# Patient Record
Sex: MALE | Race: WHITE | NOT HISPANIC OR LATINO | Employment: OTHER | URBAN - METROPOLITAN AREA
[De-identification: names, ages, dates, MRNs, and addresses within clinical notes are randomized per-mention and may not be internally consistent; named-entity substitution may affect disease eponyms.]

---

## 2017-01-17 ENCOUNTER — TRANSFERRED RECORDS (OUTPATIENT)
Dept: HEALTH INFORMATION MANAGEMENT | Facility: CLINIC | Age: 82
End: 2017-01-17

## 2017-01-31 ENCOUNTER — OFFICE VISIT (OUTPATIENT)
Dept: FAMILY MEDICINE | Facility: CLINIC | Age: 82
End: 2017-01-31
Payer: COMMERCIAL

## 2017-01-31 ENCOUNTER — TELEPHONE (OUTPATIENT)
Dept: FAMILY MEDICINE | Facility: CLINIC | Age: 82
End: 2017-01-31

## 2017-01-31 VITALS
TEMPERATURE: 98.7 F | DIASTOLIC BLOOD PRESSURE: 68 MMHG | BODY MASS INDEX: 21.33 KG/M2 | HEART RATE: 116 BPM | WEIGHT: 144 LBS | HEIGHT: 69 IN | SYSTOLIC BLOOD PRESSURE: 116 MMHG

## 2017-01-31 DIAGNOSIS — R82.90 ABNORMAL URINALYSIS: ICD-10-CM

## 2017-01-31 DIAGNOSIS — Z85.46 H/O PROSTATE CANCER: ICD-10-CM

## 2017-01-31 DIAGNOSIS — R82.90 NONSPECIFIC FINDING ON EXAMINATION OF URINE: ICD-10-CM

## 2017-01-31 DIAGNOSIS — Z96.0 HISTORY OF PENILE IMPLANT: ICD-10-CM

## 2017-01-31 DIAGNOSIS — R39.89 URINARY PROBLEM: ICD-10-CM

## 2017-01-31 DIAGNOSIS — R00.0 TACHYCARDIA: ICD-10-CM

## 2017-01-31 DIAGNOSIS — N39.0 URINARY TRACT INFECTION, SITE UNSPECIFIED: Primary | ICD-10-CM

## 2017-01-31 LAB
ALBUMIN UR-MCNC: >=300 MG/DL
APPEARANCE UR: ABNORMAL
BACTERIA #/AREA URNS HPF: ABNORMAL /HPF
BILIRUB UR QL STRIP: NEGATIVE
COLOR UR AUTO: YELLOW
GLUCOSE UR STRIP-MCNC: NEGATIVE MG/DL
HGB UR QL STRIP: ABNORMAL
KETONES UR STRIP-MCNC: NEGATIVE MG/DL
LEUKOCYTE ESTERASE UR QL STRIP: ABNORMAL
NITRATE UR QL: POSITIVE
NON-SQ EPI CELLS #/AREA URNS LPF: ABNORMAL /LPF
PH UR STRIP: 8.5 PH (ref 5–7)
RBC #/AREA URNS AUTO: ABNORMAL /HPF (ref 0–2)
SP GR UR STRIP: 1.02 (ref 1–1.03)
URN SPEC COLLECT METH UR: ABNORMAL
UROBILINOGEN UR STRIP-ACNC: 0.2 EU/DL (ref 0.2–1)
WBC #/AREA URNS AUTO: >100 /HPF (ref 0–2)

## 2017-01-31 PROCEDURE — 87088 URINE BACTERIA CULTURE: CPT | Performed by: PHYSICIAN ASSISTANT

## 2017-01-31 PROCEDURE — 81001 URINALYSIS AUTO W/SCOPE: CPT | Performed by: PHYSICIAN ASSISTANT

## 2017-01-31 PROCEDURE — 87186 SC STD MICRODIL/AGAR DIL: CPT | Performed by: PHYSICIAN ASSISTANT

## 2017-01-31 PROCEDURE — 99213 OFFICE O/P EST LOW 20 MIN: CPT | Performed by: PHYSICIAN ASSISTANT

## 2017-01-31 PROCEDURE — 87086 URINE CULTURE/COLONY COUNT: CPT | Performed by: PHYSICIAN ASSISTANT

## 2017-01-31 RX ORDER — CIPROFLOXACIN 500 MG/1
500 TABLET, FILM COATED ORAL 2 TIMES DAILY
Qty: 20 TABLET | Refills: 0 | Status: SHIPPED | OUTPATIENT
Start: 2017-01-31 | End: 2017-02-10

## 2017-01-31 NOTE — PROGRESS NOTES
"  SUBJECTIVE:                                                    Carlos Faith is a 80 year old male who presents to clinic today for the following health issues:    Genitourinary - Male     Onset: 1 week      Description:   Dysuria (painful urination): YES- burning   Hematuria (blood in urine): YES  Frequency: YES- every 2.5 hours   Are you urinating at night : YES  Hesitancy (delay in urine): YES  Retention (unable to empty): YES  Decrease in urinary flow: YES  Incontinence: no     Progression of Symptoms:  worsening    Accompanying Signs & Symptoms:  Fever: no   Back/Flank pain: no   Urethral discharge: no   Testicle lumps/masses/pain: no   Nausea and/or vomiting: no   Abdominal pain: no    History:   History of frequent UTI's: YES  History of kidney stones: no   History of hernias: YES  Personal or Family history of Prostate problems: YES- Paternal Uncle   Sexually active:      Precipitating factors:   Has been treated for multiple UTI's over the past few months     Alleviating factors:  Nothing          Therapies Tried and outcome: None     Prostatectomy at age 54 for treatment of prostate cancer. Uneventful course and negative/zero level PSA since that time. He does get recurrent UTIs this past year. This would be his 3rd or 4th. PCP recommended he see Urology but he hasn't. He's straight catheterized in the past to treat this when he's had decreased flow/scar tissue but he hasn't had to cath for many years. He did report a episode this past few days of \"blockage\" that eventually came out - described as what sounds like white blood cells or other clumps of cells.     Denies fevers, chills, CVA tenderness or other symptoms.    Patient Active Problem List   Diagnosis     H/O prostate cancer     History of penile implant     Mixed hyperlipidemia     Adjustment disorder with anxious mood     Gastroesophageal reflux disease without esophagitis        Problem list and histories reviewed & adjusted, as " "indicated.  Additional history: as documented    Problem list, Medication list, Allergies, and Medical/Social/Surgical histories reviewed in [facility] and updated as appropriate.    ROS:  Constitutional, HEENT, cardiovascular, pulmonary, gi and gu systems are negative, except as otherwise noted.    OBJECTIVE:                                                    /68 mmHg  Pulse 116  Temp(Src) 98.7  F (37.1  C) (Oral)  Ht 5' 8.5\" (1.74 m)  Wt 144 lb (65.318 kg)  BMI 21.57 kg/m2  Body mass index is 21.57 kg/(m^2).  GENERAL: healthy, alert and no distress  No CVA tenderness, abdomen soft, non tender     Results for orders placed or performed in visit on 01/31/17   *UA reflex to Microscopic and Culture ([facility] (except [place] and [place])   Result Value Ref Range    Color Urine Yellow     Appearance Urine Cloudy     Glucose Urine Negative NEG mg/dL    Bilirubin Urine Negative NEG    Ketones Urine Negative NEG mg/dL    Specific Gravity Urine 1.020 1.003 - 1.035    Blood Urine Large (A) NEG    pH Urine 8.5 (H) 5.0 - 7.0 pH    Protein Albumin Urine >=300 (A) NEG mg/dL    Urobilinogen Urine 0.2 0.2 - 1.0 EU/dL    Nitrite Urine Positive (A) NEG    Leukocyte Esterase Urine Large (A) NEG    Source Midstream Urine    Urine Microscopic   Result Value Ref Range    WBC Urine >100 (A) 0 - 2 /HPF    RBC Urine 10-25 (A) 0 - 2 /HPF    Squamous Epithelial /LPF Urine Few FEW /LPF    Bacteria Urine Many (A) NEG /HPF         ASSESSMENT/PLAN:                                                        ICD-10-CM    1. Urinary tract infection, site unspecified N39.0 ciprofloxacin (CIPRO) 500 MG tablet     order for DME   2. Urinary problem R39.89 *UA reflex to Microscopic and Culture ([facility] (except [place] and [place])     Urine Culture Aerobic Bacterial     Urine Microscopic   3. Nonspecific finding on examination of urine R82.90 Urine Culture Aerobic Bacterial   4. " Abnormal urinalysis R82.90    5. Tachycardia R00.0    6. H/O prostate cancer Z85.46    7. History of penile implant Z96.89       Reviewed - will treat. Sulfa allergy. 10 days of Cipro given, higher dose. This prescription is given with a discussion of side effects, risks and proper use.  Instructions are given to follow up if not improving or symptoms change or worsen as discussed.     SINDI Puente, PA-C

## 2017-01-31 NOTE — NURSING NOTE
"Chief Complaint   Patient presents with     UTI       Initial /68 mmHg  Pulse 116  Temp(Src) 98.7  F (37.1  C) (Oral)  Ht 5' 8.5\" (1.74 m)  Wt 144 lb (65.318 kg)  BMI 21.57 kg/m2 Estimated body mass index is 21.57 kg/(m^2) as calculated from the following:    Height as of this encounter: 5' 8.5\" (1.74 m).    Weight as of this encounter: 144 lb (65.318 kg).  BP completed using cuff size: brenna Ruiz CMA (AAMA)      "

## 2017-02-02 LAB
BACTERIA SPEC CULT: ABNORMAL
MICRO REPORT STATUS: ABNORMAL
MICROORGANISM SPEC CULT: ABNORMAL
SPECIMEN SOURCE: ABNORMAL

## 2017-02-08 DIAGNOSIS — K21.9 GASTROESOPHAGEAL REFLUX DISEASE WITHOUT ESOPHAGITIS: Primary | ICD-10-CM

## 2017-02-08 NOTE — TELEPHONE ENCOUNTER
ranitidine (ZANTAC) 300 MG tablet      Last Written Prescription Date: 12-2-2016  Last Fill Quantity: 30,  # refills: 1   Last Office Visit with [facility], [facility] or [facility] prescribing provider: 1-

## 2017-02-13 ENCOUNTER — TELEPHONE (OUTPATIENT)
Dept: FAMILY MEDICINE | Facility: CLINIC | Age: 82
End: 2017-02-13

## 2017-02-13 NOTE — TELEPHONE ENCOUNTER
Pt called back wanting to know if Sterling would be able to squeeze him in. Left message earlier and was worried cause he had notheard back yet.  .Lacy Walden  Patient Representative

## 2017-02-13 NOTE — TELEPHONE ENCOUNTER
"Called and spoke with patient. He was treated for a UTI and finished his antibiotic 2 days ago. His primary symptoms were blood in urine and urine cloudiness, which disappeared while on antibiotic. When he finished his antibiotic, he said his urine turned cloudy again. He denies pain anywhere, fever, blood in urine, inability to urinate, dysuria, or frequency. He is requesting an appointment. Appointment made, he will go to ER if he develops pain, fever, or blood in urine. Telephone triage protocols for nurse Melida Tidwell 5th edition \"painful/difficult urination\".    Melo Castillo RN    "

## 2017-02-13 NOTE — TELEPHONE ENCOUNTER
Reason for Call:  Same Day Appointment, Requested Provider:  Van Ge PA-C    PCP: Antoinette Bagley    Reason for visit: UTI    Duration of symptoms: Ongoing    Have you been treated for this in the past? Yes    Additional comments: Patient saw Van Mathises for a UTI on 1/31/17 and it still has not gone away yet. He asked if there is any way Van Mathises could see him Tuesday or Thursday around 1-2 pm. Patient prefers to see him or Dr. Bagley.     Can we leave a detailed message on this number? YES    Phone number patient can be reached at: Home number on file [phone] (home)    Best Time: Anytime    Call taken on 2/13/2017 at 1:08 PM by Shikha Martel

## 2017-02-14 ENCOUNTER — OFFICE VISIT (OUTPATIENT)
Dept: FAMILY MEDICINE | Facility: CLINIC | Age: 82
End: 2017-02-14
Payer: COMMERCIAL

## 2017-02-14 VITALS
HEART RATE: 100 BPM | HEIGHT: 69 IN | DIASTOLIC BLOOD PRESSURE: 70 MMHG | SYSTOLIC BLOOD PRESSURE: 148 MMHG | WEIGHT: 147 LBS | BODY MASS INDEX: 21.77 KG/M2 | TEMPERATURE: 98.6 F

## 2017-02-14 DIAGNOSIS — R82.90 ABNORMAL FINDING IN URINE: ICD-10-CM

## 2017-02-14 DIAGNOSIS — R39.9 UTI SYMPTOMS: Primary | ICD-10-CM

## 2017-02-14 LAB
ALBUMIN UR-MCNC: ABNORMAL MG/DL
APPEARANCE UR: ABNORMAL
BACTERIA #/AREA URNS HPF: ABNORMAL /HPF
BILIRUB UR QL STRIP: NEGATIVE
COLOR UR AUTO: YELLOW
GLUCOSE UR STRIP-MCNC: NEGATIVE MG/DL
HGB UR QL STRIP: ABNORMAL
KETONES UR STRIP-MCNC: NEGATIVE MG/DL
LEUKOCYTE ESTERASE UR QL STRIP: ABNORMAL
NITRATE UR QL: POSITIVE
PH UR STRIP: 6 PH (ref 5–7)
RBC #/AREA URNS AUTO: ABNORMAL /HPF (ref 0–2)
SP GR UR STRIP: 1.02 (ref 1–1.03)
URN SPEC COLLECT METH UR: ABNORMAL
UROBILINOGEN UR STRIP-ACNC: 0.2 EU/DL (ref 0.2–1)
WBC #/AREA URNS AUTO: ABNORMAL /HPF (ref 0–2)

## 2017-02-14 PROCEDURE — 87186 SC STD MICRODIL/AGAR DIL: CPT | Performed by: PHYSICIAN ASSISTANT

## 2017-02-14 PROCEDURE — 99213 OFFICE O/P EST LOW 20 MIN: CPT | Performed by: PHYSICIAN ASSISTANT

## 2017-02-14 PROCEDURE — 87086 URINE CULTURE/COLONY COUNT: CPT | Performed by: PHYSICIAN ASSISTANT

## 2017-02-14 PROCEDURE — 81001 URINALYSIS AUTO W/SCOPE: CPT | Performed by: PHYSICIAN ASSISTANT

## 2017-02-14 PROCEDURE — 87088 URINE BACTERIA CULTURE: CPT | Performed by: PHYSICIAN ASSISTANT

## 2017-02-14 RX ORDER — CEPHALEXIN 500 MG/1
500 CAPSULE ORAL 2 TIMES DAILY
Qty: 42 CAPSULE | Refills: 0 | Status: SHIPPED | OUTPATIENT
Start: 2017-02-14 | End: 2017-03-22

## 2017-02-14 ASSESSMENT — PATIENT HEALTH QUESTIONNAIRE - PHQ9: 5. POOR APPETITE OR OVEREATING: NOT AT ALL

## 2017-02-14 ASSESSMENT — ANXIETY QUESTIONNAIRES
2. NOT BEING ABLE TO STOP OR CONTROL WORRYING: NOT AT ALL
5. BEING SO RESTLESS THAT IT IS HARD TO SIT STILL: NOT AT ALL
3. WORRYING TOO MUCH ABOUT DIFFERENT THINGS: NOT AT ALL
6. BECOMING EASILY ANNOYED OR IRRITABLE: NOT AT ALL
1. FEELING NERVOUS, ANXIOUS, OR ON EDGE: NOT AT ALL
7. FEELING AFRAID AS IF SOMETHING AWFUL MIGHT HAPPEN: NOT AT ALL
GAD7 TOTAL SCORE: 0

## 2017-02-14 NOTE — PROGRESS NOTES
SUBJECTIVE:                                                    Carlos Faith is a 81 year old male who presents to clinic today for the following health issues:      Genitourinary - Male     Onset: 10 days    Description:   Dysuria (painful urination): no   Hematuria (blood in urine): no   Frequency: YES  Are you urinating at night : YES- once  Hesitancy (delay in urine): YES- sometimes  Retention (unable to empty): no   Decrease in urinary flow: no   Incontinence: no     Progression of Symptoms:  improving    Accompanying Signs & Symptoms:  Fever: no   Back/Flank pain: no   Urethral discharge: no   Testicle lumps/masses/pain: no   Nausea and/or vomiting: no   Abdominal pain: no    History:   History of frequent UTI's: no   History of kidney stones: no   History of hernias: YES  Personal or Family history of Prostate problems: YES  Sexually active: no     Precipitating factors:   none    Alleviating factors:  none         Therapies Tried and outcome: Cipro; Outcome - helped by the 7th day, but by 8th day urine was cloudy again    Patient presents with reoccurrence of urinary symptoms that presented with his previous diagnosis of urinary tract infection. Patient had been placed on a course of ciprofloxacin which was empiric but further showed to be proper for the Proteus spp. that was grown on culture. Patient noticed remission of symptoms after about seven days on the antibiotics but a resumption of the feeling of frequency by the eighth or ninth day of the course. In addition, patient noticed a rash on both legs and his buttocks and stated that he was unable to fall asleep on the medication. Patient currently denies any fevers, burning or pain on urination, feelings of incomplete voiding, discharge, abdominal pain, N/V, diarrhea, or blood in the urine or stool.     Problem list and histories reviewed & adjusted, as indicated.  Additional history: as documented    Problem list, Medication list, Allergies,  "and Medical/Social/Surgical histories reviewed in [facility] and updated as appropriate.    ROS:  Constitutional, HEENT, cardiovascular, pulmonary, gi and gu systems are negative, except as otherwise noted.    OBJECTIVE:                                                    /70 (Cuff Size: Adult Regular)  Pulse 100  Temp 98.6  F (37  C) (Oral)  Ht 5' 8.5\" (1.74 m)  Wt 147 lb (66.7 kg)  BMI 22.03 kg/m2  Body mass index is 22.03 kg/(m^2).  GENERAL: healthy, alert and no distress  RESP: lungs clear to auscultation - no rales, rhonchi or wheezes  CV: regular rate and rhythm, normal S1 S2, no S3 or S4, no murmur, click or rub, no peripheral edema and peripheral pulses strong  ABDOMEN: soft, nontender, no hepatosplenomegaly, no masses and bowel sounds normal, no suprapubic pain, no peritoneal signs or tenderness over the appendix or at McBurney's point  MS: no gross musculoskeletal defects noted, no edema  BACK: no CVA tenderness, no paralumbar tenderness    Diagnostic Test Results:  Urinalysis - Positive nitrites, moderate Leukocyte esterase, >100 WBC, and many bacteria     ASSESSMENT/PLAN:                                                      (R39.9) UTI symptoms  (primary encounter diagnosis)  Comment: Patient is suffering from a UTI, likely of the same etiology as cultured previously. Additional diagnoses considered but ultimately rejected based on patient's signs and symptoms, physical exam, natural history of illness, epidemiology, and clinical judgment, include pyelonephritis, bladder cancer, urinary strictures due to previous prostate surgery, appendicitis, nephrolithiasis.  Plan: UA reflex to Microscopic and Culture         ([facility] (except         [place] and [place]), Urine Microscopic,         cephALEXin (KEFLEX) 500 MG capsule, UROLOGY         ADULT REFERRAL  Will treat. Different antibiotic as symptoms arose mid treatment with Cipro. This prescription is given with a discussion " of side effects, risks and proper use.  Instructions are given to follow up if not improving or symptoms change or worsen as discussed.           (R82.90) Abnormal finding in urine  Comment: Patient is suffering from a UTI, likely of the same etiology as cultured previously. Additional diagnoses considered but ultimately rejected based on patient's signs and symptoms, physical exam, natural history of illness, epidemiology, and clinical judgment, include pyelonephritis, bladder cancer, urinary strictures due to previous prostate surgery, appendicitis, nephrolithiasis.  Plan: Urine Culture Aerobic Bacterial, cephALEXin         (KEFLEX) 500 MG capsule, UROLOGY ADULT REFERRAL     FRENCH BUCHANAN PA-C  [facility]

## 2017-02-14 NOTE — NURSING NOTE
"Chief Complaint   Patient presents with     Urinary Problem     Referral     [facility]       Initial /70 (Cuff Size: Adult Regular)  Pulse 100  Temp 98.6  F (37  C) (Oral)  Ht 5' 8.5\" (1.74 m)  Wt 147 lb (66.7 kg)  BMI 22.03 kg/m2 Estimated body mass index is 22.03 kg/(m^2) as calculated from the following:    Height as of this encounter: 5' 8.5\" (1.74 m).    Weight as of this encounter: 147 lb (66.7 kg).  Medication Reconciliation: complete   Merissa Garcia, Certified Medical Assistant (AAMA)     "

## 2017-02-14 NOTE — MR AVS SNAPSHOT
After Visit Summary   2/14/2017    Carlos Faith    MRN: [id number]           Patient Information     Date Of Birth          1935        Visit Information        Provider Department      2/14/2017 11:00 AM Van Ge PA-C [facility]        Today's Diagnoses     UTI symptoms    -  1    Abnormal finding in urine           Follow-ups after your visit        Additional Services     UROLOGY ADULT REFERRAL       Your provider has referred you to: G: [facility] [place] [phone]   [email or web address]  UM: [place] [facility] C[facility] [phone]   [email or web address]    Please be aware that coverage of these services is subject to the terms and limitations of your health insurance plan.  Call member services at your health plan with any benefit or coverage questions.      Please bring the following with you to your appointment:    (1) Any X-Rays, CTs or MRIs which have been performed.  Contact the facility where they were done to arrange for  prior to your scheduled appointment.    (2) List of current medications  (3) This referral request   (4) Any documents/labs given to you for this referral                  Who to contact     If you have questions or need follow up information about today's clinic visit or your schedule please contact [facility] directly at [phone].  Normal or non-critical lab and imaging results will be communicated to you by MyChart, letter or phone within 4 business days after the clinic has received the results. If you do not hear from us within 7 days, please contact the clinic through MyChart or phone. If you have a critical or abnormal lab result, we will notify you by phone as soon as possible.  Submit refill requests through [employer] or call your pharmacy and they will forward  "the refill request to us. Please allow 3 business days for your refill to be completed.          Additional Information About Your Visit        [employer] Information     [employer] lets you send messages to your doctor, view your test results, renew your prescriptions, schedule appointments and more. To sign up, go to [email or web address] . Click on \"Log in\" on the left side of the screen, which will take you to the Welcome page. Then click on \"Sign up Now\" on the right side of the page.     You will be asked to enter the access code listed below, as well as some personal information. Please follow the directions to create your username and password.     Your access code is: [email or web address]  Expires: 5/15/2017 11:50 AM     Your access code will  in 90 days. If you need help or a new code, please call your [place] clinic or [phone].        Care EveryWhere ID     This is your Care EveryWhere ID. This could be used by other organizations to access your [place] medical records  [id number]        Your Vitals Were     Pulse Temperature Height BMI (Body Mass Index)          100 98.6  F (37  C) (Oral) 5' 8.5\" (1.74 m) 22.03 kg/m2         Blood Pressure from Last 3 Encounters:   17 148/70   17 116/68   10/04/16 126/86    Weight from Last 3 Encounters:   17 147 lb (66.7 kg)   17 144 lb (65.3 kg)   10/04/16 144 lb (65.3 kg)              We Performed the Following     *UA reflex to Microscopic and Culture ([facility] (except [place] and [place])     Urine Culture Aerobic Bacterial     Urine Microscopic     UROLOGY ADULT REFERRAL          Today's Medication Changes          These changes are accurate as of: 17 11:51 AM.  If you have any questions, ask your nurse or doctor.               Start taking these medicines.        Dose/Directions    cephALEXin 500 MG capsule   Commonly known as:  KEFLEX   Used for:  UTI symptoms, Abnormal finding in urine   Started " by:  Van Ge PA-C        Dose:  500 mg   Take 1 capsule (500 mg) by mouth 2 times daily   Quantity:  42 capsule   Refills:  0            Where to get your medicines      These medications were [place] [facility] [place] [address]  [address] [facility] [address]     Phone:  [phone]     cephALEXin 500 MG capsule                Primary Care Provider Office Phone # Fax #    Antoinette Bagley [phone]       [facility] [address] [address]        Thank you!     Thank you for choosing [facility]  for your care. Our goal is always to provide you with excellent care. Hearing back from our patients is one way we can continue to improve our services. Please take a few minutes to complete the written survey that you may receive in the mail after your visit with us. Thank you!             Your Updated Medication List - Protect others around you: Learn how to safely use, store and throw away your medicines at www.disposemymeds.org.          This list is accurate as of: 2/14/17 11:51 AM.  Always use your most recent med list.                   Brand Name Dispense Instructions for use    amitriptyline 25 MG tablet    ELAVIL    90 tablet    Take 1 tablet (25 mg) by mouth At Bedtime       cephALEXin 500 MG capsule    KEFLEX    42 capsule    Take 1 capsule (500 mg) by mouth 2 times daily       glucosamine-chondroitin 500-400 MG Caps per capsule      Take 1 capsule by mouth daily       order for DME     1 Box    Equipment being ordered: Straight Cath  - box, size and length per patient preference       pantoprazole 40 MG EC tablet    PROTONIX    90 tablet    Take 1 tablet (40 mg) by mouth every morning (before breakfast)       ranitidine 300 MG tablet    ZANTAC    30 tablet    Take 1 tablet (300 mg) by mouth nightly as needed for heartburn       simvastatin 5 MG tablet    ZOCOR     180 tablet    Take 2 tablets (10 mg) by mouth At Bedtime **Due for office visit for further refills**       SLO-NIACIN PO

## 2017-02-15 ASSESSMENT — ANXIETY QUESTIONNAIRES: GAD7 TOTAL SCORE: 0

## 2017-02-15 ASSESSMENT — PATIENT HEALTH QUESTIONNAIRE - PHQ9: SUM OF ALL RESPONSES TO PHQ QUESTIONS 1-9: 0

## 2017-02-16 NOTE — TELEPHONE ENCOUNTER
"Attempt # 1    Called patient at home number.     Was call answered?  Yes.  \"May I please speak with <patient name>\"  Is patient available?   No. Left message with female for patient to call me back.     Sapna Knight RN   February 15, 2017 6:01 PM  [facility] Triage   [phone]   "

## 2017-02-16 NOTE — TELEPHONE ENCOUNTER
Called and spoke to  Carlos. He only takes pantoprazole. Request canceled and updated med list.     Sapna Knight RN   February 15, 2017 6:08 PM  [facility] Triage   [phone]

## 2017-02-17 ENCOUNTER — TELEPHONE (OUTPATIENT)
Dept: FAMILY MEDICINE | Facility: CLINIC | Age: 82
End: 2017-02-17

## 2017-02-17 DIAGNOSIS — R09.89 CHOKING EPISODE: ICD-10-CM

## 2017-02-17 DIAGNOSIS — K21.9 GASTROESOPHAGEAL REFLUX DISEASE WITHOUT ESOPHAGITIS: Primary | ICD-10-CM

## 2017-02-17 NOTE — TELEPHONE ENCOUNTER
Reason for Call:  Other :  Order    Detailed comments:  Patient would like an order to get his EGD done at [facility].  Please fax order to [phone].  Patient would like to schedule ASAP and cannot do so unless there is an order.    Phone Number Patient can be reached at: Home number on file [phone] (home)    Best Time: Any      Can we leave a detailed message on this number? YES    Call taken on 2/17/2017 at 2:51 PM by Emma Martinez

## 2017-02-20 NOTE — TELEPHONE ENCOUNTER
Reviewed. I'm not sure what he's talking about. I saw him for 2 UTIs. This is Dr. Bagley's patient.  Recommend to defer to her - I don't see a GI referral in the system and he and I never talked about any GI issues.  Thanks.  Van Ge, ANDRES, PA-C

## 2017-02-21 ENCOUNTER — TELEPHONE (OUTPATIENT)
Dept: FAMILY MEDICINE | Facility: CLINIC | Age: 82
End: 2017-02-21

## 2017-02-21 NOTE — TELEPHONE ENCOUNTER
Team RN - please call patient and let him know his urine culture results grew out a different bacteria than previously. The 2 bacteria that grew out do not come from his mouth, so his theory that flossing is causing recurrent infections is not true.    I'd recommend again (as has his PCP Dr. Bagley) that he see Urology. Please encourage that. I do believe there is a referral in [facility] - we can give him that number again.     Let me know if he's symptomatic in any way.  Thank you.  SINDI Puente, PA-C

## 2017-02-21 NOTE — TELEPHONE ENCOUNTER
Called patient's home number, woman answered phone and left message to call us back.    Melo Castillo RN

## 2017-02-22 NOTE — TELEPHONE ENCOUNTER
Called and gave patient info below. He will make a urology appt.    Patient is wanting referral to [facility]. He said he had a referral faxed to [facility], but he has an appt for [facility].    Melo Castillo RN

## 2017-02-22 NOTE — TELEPHONE ENCOUNTER
Reviewed. I don't know anything about his GI request. I saw him for the acute UTI. Please route Gastro / Specialty requests or questions back to his PCP.  Thanks!  [place]

## 2017-02-24 NOTE — TELEPHONE ENCOUNTER
Referral didn't specify location- he wants to go to [facility]. Re-orded & gave phone number & encouraged him to call to schedule. He had a colonoscopy there recently & was very happy with the service.  Halie Snyder RN

## 2017-02-24 NOTE — TELEPHONE ENCOUNTER
Patient did not complain of choking  I did place a GI referral  He may need to be seen if having significant concerns  Antoinette Bagley D.O.

## 2017-02-27 ENCOUNTER — TELEPHONE (OUTPATIENT)
Dept: FAMILY MEDICINE | Facility: CLINIC | Age: 82
End: 2017-02-27

## 2017-02-27 DIAGNOSIS — K21.00 GASTROESOPHAGEAL REFLUX DISEASE WITH ESOPHAGITIS: ICD-10-CM

## 2017-02-27 RX ORDER — PANTOPRAZOLE SODIUM 40 MG/1
40 TABLET, DELAYED RELEASE ORAL
Qty: 90 TABLET | Refills: 2 | Status: SHIPPED | OUTPATIENT
Start: 2017-02-27 | End: 2017-03-22

## 2017-02-27 NOTE — TELEPHONE ENCOUNTER
Left VM. Patient spoke with Sapna on 2/15 & patient stated he wasn't taking ranitidine.   Halie Snyder RN

## 2017-02-27 NOTE — TELEPHONE ENCOUNTER
Carlos calls back stating he doesn't care which drug he takes but he has never received the protonix from [facility] [employer] [facility] mail service.  I resent protonix & asked him to call [facility] [employer] [facility] to make sure they are sending #90 as he requests.  Halie Snyder RN

## 2017-02-27 NOTE — TELEPHONE ENCOUNTER
Patient is calling regarding a refill on Ranitidine patient noted that he has been trying to get a refill for about a month. Please note 02/08/2017 phone message.  Send to [facility] [email or web address] [phone] RX #[id number]  Please call and advise Thank you [phone]

## 2017-03-09 ENCOUNTER — TRANSFERRED RECORDS (OUTPATIENT)
Dept: HEALTH INFORMATION MANAGEMENT | Facility: CLINIC | Age: 82
End: 2017-03-09

## 2017-03-22 ENCOUNTER — TELEPHONE (OUTPATIENT)
Dept: FAMILY MEDICINE | Facility: CLINIC | Age: 82
End: 2017-03-22

## 2017-03-22 ENCOUNTER — OFFICE VISIT (OUTPATIENT)
Dept: FAMILY MEDICINE | Facility: CLINIC | Age: 82
End: 2017-03-22
Payer: COMMERCIAL

## 2017-03-22 VITALS
DIASTOLIC BLOOD PRESSURE: 86 MMHG | SYSTOLIC BLOOD PRESSURE: 146 MMHG | BODY MASS INDEX: 22.13 KG/M2 | TEMPERATURE: 98.1 F | RESPIRATION RATE: 16 BRPM | WEIGHT: 149.4 LBS | HEIGHT: 69 IN | HEART RATE: 94 BPM

## 2017-03-22 DIAGNOSIS — R82.90 ABNORMAL URINALYSIS: ICD-10-CM

## 2017-03-22 DIAGNOSIS — N39.0 RECURRENT UTI (URINARY TRACT INFECTION): Primary | ICD-10-CM

## 2017-03-22 DIAGNOSIS — R05.9 COUGH: ICD-10-CM

## 2017-03-22 DIAGNOSIS — R30.0 DYSURIA: ICD-10-CM

## 2017-03-22 DIAGNOSIS — D22.9 ATYPICAL MOLE: ICD-10-CM

## 2017-03-22 LAB
ALBUMIN UR-MCNC: ABNORMAL MG/DL
APPEARANCE UR: ABNORMAL
BACTERIA #/AREA URNS HPF: ABNORMAL /HPF
BILIRUB UR QL STRIP: NEGATIVE
COLOR UR AUTO: YELLOW
GLUCOSE UR STRIP-MCNC: NEGATIVE MG/DL
HGB UR QL STRIP: ABNORMAL
KETONES UR STRIP-MCNC: NEGATIVE MG/DL
LEUKOCYTE ESTERASE UR QL STRIP: ABNORMAL
NITRATE UR QL: POSITIVE
NON-SQ EPI CELLS #/AREA URNS LPF: ABNORMAL /LPF
PH UR STRIP: 7 PH (ref 5–7)
RBC #/AREA URNS AUTO: ABNORMAL /HPF (ref 0–2)
SP GR UR STRIP: 1.01 (ref 1–1.03)
URN SPEC COLLECT METH UR: ABNORMAL
UROBILINOGEN UR STRIP-ACNC: 0.2 EU/DL (ref 0.2–1)
WBC #/AREA URNS AUTO: >100 /HPF (ref 0–2)

## 2017-03-22 PROCEDURE — 87186 SC STD MICRODIL/AGAR DIL: CPT | Performed by: FAMILY MEDICINE

## 2017-03-22 PROCEDURE — 87086 URINE CULTURE/COLONY COUNT: CPT | Performed by: FAMILY MEDICINE

## 2017-03-22 PROCEDURE — 81001 URINALYSIS AUTO W/SCOPE: CPT | Performed by: FAMILY MEDICINE

## 2017-03-22 PROCEDURE — 99214 OFFICE O/P EST MOD 30 MIN: CPT | Performed by: FAMILY MEDICINE

## 2017-03-22 PROCEDURE — 87088 URINE BACTERIA CULTURE: CPT | Performed by: FAMILY MEDICINE

## 2017-03-22 RX ORDER — CIPROFLOXACIN 250 MG/1
250 TABLET, FILM COATED ORAL 2 TIMES DAILY
Qty: 20 TABLET | Refills: 0 | Status: SHIPPED | OUTPATIENT
Start: 2017-03-22 | End: 2017-03-29

## 2017-03-22 NOTE — TELEPHONE ENCOUNTER
Reason for Call:  Same Day Appointment, Requested Provider:  Antoinette Bagley DO    PCP: Antoinette Bagley    Reason for visit: UTI and cold    Duration of symptoms: one week for urinary and Saturday for the cold    Additional comments: patient would like to be seen today at [place] with any provider.    Can we leave a detailed message on this number? YES    Phone number patient can be reached at: Home number on file [phone] (home)    Best Time: anytime    Call taken on 3/22/2017 at 9:55 AM by Radha Martinez

## 2017-03-22 NOTE — PATIENT INSTRUCTIONS
Use antibiotics  Increase fluids  Saline spray  Follow up with Urology  Follow up for mole removal as discussed  Antoinette Bagley D.O.    [facility]   Discharged by : Bronwyn Pierre MA    Paper scripts provided to patient : no     If you have any questions regarding your visit please contact your care team:     [facility] Clinic Hours Telephone Number   Dr. Bronwyn Cartagena, CARLA   7am-7pm Monday - Thursday   7am-5pm Fridays  [phone]   (Appointment scheduling available 24/7)   RN Line   [phone] option 2       For a Price Quote for your services, please call our Consumer Price Line at [phone].     What options do I have for visits at the clinic other than the traditional office visit?     To expand how we care for you, many of our providers are utilizing electronic visits (e-visits) and telephone visits, when medically appropriate, for interactions with their patients rather than a visit in the clinic. We also offer nurse visits for many medical concerns. Just like any other service, we will bill your insurance company for this type of visit based on time spent on the phone with your provider. Not all insurance companies cover these visits. Please check with your medical insurance if this type of visit is covered. You will be responsible for any charges that are not paid by your insurance.   E-visits via The Label Corphart: generally incur a $35.00 fee.     Telephone visits:   Time spent on the phone: *charged based on time that is spent on the phone in increments of 10 minutes. Estimated cost:   5-10 mins $30.00   11-20 mins. $59.00   21-30 mins. $85.00     Use The Label Corphart (secure email communication and access to your chart) to send your primary care provider a message or make an appointment. Ask someone on your Team how to sign up for [email or web address].     As always, Thank you for trusting us with your health care needs!      [facility]  Services:    Scheduling Appointments  [facility]  Call: [phone]    [facility]  Call: [phone]    [facility]  Call: [phone]      WHERE TO GO FOR CARE?    Clinic    Make an appointment if you:       Are sick (cold, cough, flu, sore throat, earache or in pain).       Have a small injury (sprain, small cut, burn or broken bone).       Need a physical exam, Pap smear, vaccine or prescription refill.       Have questions about your health or medicines.    To reach us:      Call [phone] ([phone]). Open 24 hours every day. (For counseling services, call [phone].)    Log into [employer] at Fio. (Visit AFreeze to create an account.) Hospital emergency room    An emergency is a serious or life- threatening problem that must be treated right away.    Call [phone] or get to the hospital if you have:      Very bad or sudden:            - Chest pain or pressure         - Bleeding         - Head or belly pain         - Dizziness or trouble seeing, walking or                          Speaking      Problems breathing      Blood in your vomit or you are coughing up blood      A major injury (knocked out, loss of a finger or limb, rape, broken bone protruding from skin)    A mental health crisis. (Or call the Mental Health Crisis line at 1-727.297.1474 or Suicide Prevention Hotline at 1-405.638.6227.)    Open 24 hours every day. You don't need an appointment.     Urgent care    Visit urgent care for sickness or small injuries when the clinic is closed. You don't need an appointment. To check hours or find an urgent care near you, visit [email or web address]. Online care    Get online care from [employer] for more than 70 common problems, like colds, allergies and infections. Open 24 hours every day at: [email or web address]   Need help deciding?    For advice about where to be seen, you may call your clinic  and ask to speak with a nurse. We're here for you 24 hours every day.         If you are deaf or hard of hearing, please let us know. We provide many free services including sign language interpreters, oral interpreters, TTYs, telephone amplifiers, note takers and written materials.

## 2017-03-22 NOTE — PROGRESS NOTES
SUBJECTIVE:                                                    Carlos Faith is a 82 year old male who presents to clinic today for the following health issues:      Genitourinary - Male     Onset:  Over a week now    Description:   Dysuria (painful urination): no   Hematuria (blood in urine): no   Frequency: YES  Are you urinating at night : YES-  Usually once  Hesitancy (delay in urine): no   Retention (unable to empty): no   Decrease in urinary flow: YES a little  Incontinence: no     Progression of Symptoms:  same    Accompanying Signs & Symptoms:  Fever: no   Back/Flank pain: no   Urethral discharge: no   Testicle lumps/masses/pain: no   Nausea and/or vomiting: no   Abdominal pain: no    History:   History of frequent UTI's: YES  History of kidney stones: no   History of hernias: YES-  2   Personal or Family history of Prostate problems: YES- Prostate Cancer  Sexually active: no     Precipitating factors:   None    Alleviating factors:  None         Therapies Tried and outcome: Keflex;    Prostatectomy at age 54 for treatment of prostate cancer. Uneventful course and negative/zero level PSA since that time. He does get recurrent UTIs this past year. This would be his 5th. PCP recommended he see Urology but he hasn't.he feels like his incontinence is worsening and has an appoint with urology as discussed in 2 weeks     Denies fevers, chills, CVA tenderness or other symptoms.  1/31-grew proteus mirabilis-sensitive to lots abx  2/14-grew ecoli-sensitive to lots of antibiotics  10/4-mixed sharla  1/7-mixed sharla    Cough for a week, no fever, no weakness, no fatigue    History of ulcer and has not used PPi for a week and feels good  Had egd many years ago and normal results no symptoms    Mole on the back changing, no history of skin cancer  Darker and bigger          Problem list and histories reviewed & adjusted, as indicated.  Additional history: as documented    Patient Active Problem List   Diagnosis  "    H/O prostate cancer     History of penile implant     Mixed hyperlipidemia     Adjustment disorder with anxious mood     Gastroesophageal reflux disease without esophagitis     Past Surgical History:   Procedure Laterality Date      penile implant  1994     HERNIA REPAIR, INGUINAL RT/LT       PROSTATE SURGERY  1994    Prostatectomy       Social History   Substance Use Topics     Smoking status: Never Smoker     Smokeless tobacco: Never Used     Alcohol use 0.0 oz/week     0 Standard drinks or equivalent per week      Comment: Wine     Family History   Problem Relation Age of Onset     Coronary Artery Disease Father      83 MI     Prostate Cancer Paternal Grandfather            Reviewed and updated as needed this visit by clinical staff  Tobacco  Allergies  Meds  Med Hx  Surg Hx  Fam Hx  Soc Hx      Reviewed and updated as needed this visit by Provider         ROS:  Constitutional, HEENT, cardiovascular, pulmonary, GI, , musculoskeletal, neuro, skin, endocrine and psych systems are negative, except as otherwise noted.    OBJECTIVE:                                                    /86 (BP Location: Right arm, Patient Position: Chair, Cuff Size: Adult Regular)  Pulse 94  Temp 98.1  F (36.7  C) (Oral)  Resp 16  Ht 5' 8.5\" (1.74 m)  Wt 149 lb 6.4 oz (67.8 kg)  BMI 22.39 kg/m2  Body mass index is 22.39 kg/(m^2).  GENERAL: healthy, alert and no distress  EYES: Eyes grossly normal to inspection, PERRL and conjunctivae and sclerae normal  HENT: ear canals and TM's normal, nose and mouth without ulcers or lesions  NECK: no adenopathy, no asymmetry, masses, or scars and thyroid normal to palpation  RESP: lungs clear to auscultation - no rales, rhonchi or wheezes  CV: regular rate and rhythm, normal S1 S2, no S3 or S4, no murmur, click or rub, no peripheral edema and peripheral pulses strong  ABDOMEN: soft, nontender, no hepatosplenomegaly, no masses and bowel sounds normal  MS: no gross " musculoskeletal defects noted, no edema  SKIN: 7mm dark lesion with irregular color and shape      Diagnostic Test Results:  .  Results for orders placed or performed in visit on 03/22/17 (from the past 24 hour(s))   UA reflex to Microscopic and Culture   Result Value Ref Range    Color Urine Yellow     Appearance Urine Slightly Cloudy     Glucose Urine Negative NEG mg/dL    Bilirubin Urine Negative NEG    Ketones Urine Negative NEG mg/dL    Specific Gravity Urine 1.015 1.003 - 1.035    Blood Urine Small (A) NEG    pH Urine 7.0 5.0 - 7.0 pH    Protein Albumin Urine Trace (A) NEG mg/dL    Urobilinogen Urine 0.2 0.2 - 1.0 EU/dL    Nitrite Urine Positive (A) NEG    Leukocyte Esterase Urine Large (A) NEG    Source Midstream Urine    Urine Microscopic   Result Value Ref Range    WBC Urine >100 (A) 0 - 2 /HPF    RBC Urine 2-5 (A) 0 - 2 /HPF    Squamous Epithelial /LPF Urine Few FEW /LPF    Bacteria Urine Many (A) NEG /HPF          ASSESSMENT/PLAN:                                                        ICD-10-CM    1. Recurrent UTI (urinary tract infection) N39.0 ciprofloxacin (CIPRO) 250 MG tablet   2. Abnormal urinalysis R82.90 Urine Culture Aerobic Bacterial   3. Cough R05 ciprofloxacin (CIPRO) 250 MG tablet   4. Dysuria R30.0 UA reflex to Microscopic and Culture     Urine Culture Aerobic Bacterial     Urine Microscopic   5. Atypical mole D22.9      UTI-recurrent, patient needs to see Urology. He does have an appoint.  Cough-sounds viral for now, continue monitoring  Mole -atypical, removal advsied    See Patient Instructions    Antoinette Bagley DO  [facility]

## 2017-03-22 NOTE — TELEPHONE ENCOUNTER
Reason for Call:  Other appointment    Detailed comments: Patient needs to schedule a procedure with Dr. Bagley    Phone Number Patient can be reached at: Home number on file [phone] (home)    Best Time: anytime    Can we leave a detailed message on this number? YES    Call taken on 3/22/2017 at 3:20 PM by Melo De La Fuente

## 2017-03-22 NOTE — MR AVS SNAPSHOT
After Visit Summary   3/22/2017    Carlos Faith    MRN: [id number]           Patient Information     Date Of Birth          1935        Visit Information        Provider Department      3/22/2017 11:20 AM Antoinette Bagley DO [facility]        Today's Diagnoses     Dysuria    -  1    Abnormal urinalysis        Atypical mole        Cough        Recurrent UTI (urinary tract infection)          Care Instructions    Use antibiotics  Increase fluids  Saline spray  Follow up with Urology  Follow up for mole removal as discussed  Antoinette Bagley D.O.    [facility]   Discharged by : Bronwyn Pierre MA    Paper scripts provided to patient : no     If you have any questions regarding your visit please contact your care team:     Team [facility] Clinic Hours Telephone Number   Dr. Bronwyn Cartagena PA-C   7am-7pm Monday - Thursday   7am-5pm Fridays  [phone]   (Appointment scheduling available 24/7)   RN Line   [phone] option 2       For a Price Quote for your services, please call our Consumer Price Line at [phone].     What options do I have for visits at the clinic other than the traditional office visit?     To expand how we care for you, many of our providers are utilizing electronic visits (e-visits) and telephone visits, when medically appropriate, for interactions with their patients rather than a visit in the clinic. We also offer nurse visits for many medical concerns. Just like any other service, we will bill your insurance company for this type of visit based on time spent on the phone with your provider. Not all insurance companies cover these visits. Please check with your medical insurance if this type of visit is covered. You will be responsible for any charges that are not paid by your insurance.   E-visits via [employer]: generally incur a $35.00 fee.     Telephone visits:   Time spent  on the phone: *charged based on time that is spent on the phone in increments of 10 minutes. Estimated cost:   5-10 mins $30.00   11-20 mins. $59.00   21-30 mins. $85.00     Use Profitect (secure email communication and access to your chart) to send your primary care provider a message or make an appointment. Ask someone on your Team how to sign up for Profitect.     As always, Thank you for trusting us with your health care needs!      [facility] Services:    Scheduling Appointments  [facility]  Call: [phone]    [place]  Call: [phone]    [facility]  Call: [phone]      WHERE TO GO FOR CARE?    Clinic    Make an appointment if you:       Are sick (cold, cough, flu, sore throat, earache or in pain).       Have a small injury (sprain, small cut, burn or broken bone).       Need a physical exam, Pap smear, vaccine or prescription refill.       Have questions about your health or medicines.    To reach us:      Call [phone] ([phone]). Open 24 hours every day. (For counseling services, call [phone].)    Log into Profitect at Online [email or web address]. (Visit [email or web address] to create an account.) Hospital emergency room    An emergency is a serious or life- threatening problem that must be treated right away.    Call [phone] or get to the hospital if you have:      Very bad or sudden:            - Chest pain or pressure         - Bleeding         - Head or belly pain         - Dizziness or trouble seeing, walking or                          Speaking      Problems breathing      Blood in your vomit or you are coughing up blood      A major injury (knocked out, loss of a finger or limb, rape, broken bone protruding from skin)    A mental health crisis. (Or call the Mental Health Crisis line at 1-309.730.5890 or Suicide Prevention Hotline at 1-540.376.3230.)    Open 24 hours every day. You don't need an  appointment.     Urgent care    Visit urgent care for sickness or small injuries when the clinic is closed. You don't need an appointment. To check hours or find an urgent care near you, visit [email or web address]. Online care    Get online care from [facility] for more than 70 common problems, like colds, allergies and infections. Open 24 hours every day at: [email or web address]   Need help deciding?    For advice about where to be seen, you may call your clinic and ask to speak with a nurse. We're here for you 24 hours every day.         If you are deaf or hard of hearing, please let us know. We provide many free services including sign language interpreters, oral interpreters, TTYs, telephone amplifiers, note takers and written materials.                         Follow-ups after your visit        Your next 10 appointments already scheduled     Apr 11, 2017 10:30 AM CDT   New Visit with Juanito Vaughan MD   [facility] ([address] [address] [phone]              Who to contact     If you have questions or need follow up information about today's clinic visit or your schedule please contact [facility] directly at [phone].  Normal or non-critical lab and imaging results will be communicated to you by Zolahart, letter or phone within 4 business days after the clinic has received the results. If you do not hear from us within 7 days, please contact the clinic through Zolahart or phone. If you have a critical or abnormal lab result, we will notify you by phone as soon as possible.  Submit refill requests through Scrip-t or call your pharmacy and they will forward the refill request to us. Please allow 3 business days for your refill to be completed.          Additional Information About Your Visit        Scrip-t Information     Scrip-t lets you send messages to your doctor, view your test results, renew your  "prescriptions, schedule appointments and more. To sign up, go to [email or web address] . Click on \"Log in\" on the left side of the screen, which will take you to the Welcome page. Then click on \"Sign up Now\" on the right side of the page.     You will be asked to enter the access code listed below, as well as some personal information. Please follow the directions to create your username and password.     Your access code is: [email or web address]  Expires: 5/15/2017 12:50 PM     Your access code will  in 90 days. If you need help or a new code, please call your [facility] [place] clinic or [phone].        Care EveryWhere ID     This is your Care EveryWhere ID. This could be used by other organizations to access your [facility] [place] medical records  [id number]        Your Vitals Were     Pulse Temperature Respirations Height BMI (Body Mass Index)       94 98.1  F (36.7  C) (Oral) 16 5' 8.5\" (1.74 m) 22.39 kg/m2        Blood Pressure from Last 3 Encounters:   17 146/86   17 148/70   17 116/68    Weight from Last 3 Encounters:   17 149 lb 6.4 oz (67.8 kg)   17 147 lb (66.7 kg)   17 144 lb (65.3 kg)              We Performed the Following     UA reflex to Microscopic and Culture     Urine Culture Aerobic Bacterial     Urine Microscopic          Today's Medication Changes          These changes are accurate as of: 3/22/17 11:55 AM.  If you have any questions, ask your nurse or doctor.               Start taking these medicines.        Dose/Directions    ciprofloxacin 250 MG tablet   Commonly known as:  CIPRO   Used for:  Cough, Recurrent UTI (urinary tract infection)   Started by:  Antoinette Bagley DO        Dose:  250 mg   Take 1 tablet (250 mg) by mouth 2 times daily for 10 days   Quantity:  20 tablet   Refills:  0            Where to get your medicines      These medications [place] [facility] [place] [address]  [address] " [facility] [address]     Phone:  [phone]     ciprofloxacin 250 MG tablet                Primary Care Provider Office Phone # Fax #    Antoinette Bagley [phone]       [facility] [address] [facility] [address]        Thank you!     Thank you for choosing [facility]  for your care. Our goal is always to provide you with excellent care. Hearing back from our patients is one way we can continue to improve our services. Please take a few minutes to complete the written survey that you may receive in the mail after your visit with us. Thank you!             Your Updated Medication List - Protect others around you: Learn how to safely use, store and throw away your medicines at www.disposemymeds.org.          This list is accurate as of: 3/22/17 11:55 AM.  Always use your most recent med list.                   Brand Name Dispense Instructions for use    amitriptyline 25 MG tablet    ELAVIL    90 tablet    Take 1 tablet (25 mg) by mouth At Bedtime       ciprofloxacin 250 MG tablet    CIPRO    20 tablet    Take 1 tablet (250 mg) by mouth 2 times daily for 10 days       glucosamine-chondroitin 500-400 MG Caps per capsule      Take 1 capsule by mouth daily       order for DME     1 Box    Equipment being ordered: Straight Cath  - box, size and length per patient preference       simvastatin 5 MG tablet    ZOCOR    180 tablet    Take 2 tablets (10 mg) by mouth At Bedtime **Due for office visit for further refills**       SLO-NIACIN PO

## 2017-03-22 NOTE — NURSING NOTE
"Chief Complaint   Patient presents with     Urinary Problem       Initial /86 (BP Location: Right arm, Patient Position: Chair, Cuff Size: Adult Regular)  Pulse 94  Temp 98.1  F (36.7  C) (Oral)  Resp 16  Ht 5' 8.5\" (1.74 m)  Wt 149 lb 6.4 oz (67.8 kg)  BMI 22.39 kg/m2 Estimated body mass index is 22.39 kg/(m^2) as calculated from the following:    Height as of this encounter: 5' 8.5\" (1.74 m).    Weight as of this encounter: 149 lb 6.4 oz (67.8 kg).  Medication Reconciliation: complete    "

## 2017-03-24 ENCOUNTER — TELEPHONE (OUTPATIENT)
Dept: FAMILY MEDICINE | Facility: CLINIC | Age: 82
End: 2017-03-24

## 2017-03-24 DIAGNOSIS — N39.0 URINARY TRACT INFECTION, SITE UNSPECIFIED: ICD-10-CM

## 2017-03-24 RX ORDER — CEPHALEXIN 500 MG/1
500 CAPSULE ORAL 2 TIMES DAILY
Qty: 28 CAPSULE | Refills: 0 | Status: SHIPPED | OUTPATIENT
Start: 2017-03-24 | End: 2017-04-07

## 2017-03-24 NOTE — TELEPHONE ENCOUNTER
Called patient and notified that cipro is resistant to e coli  Sent a new antibiotics(keflex) sent to pharmacy  Antoinette Bagley D.O.

## 2017-03-29 ENCOUNTER — OFFICE VISIT (OUTPATIENT)
Dept: FAMILY MEDICINE | Facility: CLINIC | Age: 82
End: 2017-03-29
Payer: COMMERCIAL

## 2017-03-29 VITALS
HEART RATE: 102 BPM | BODY MASS INDEX: 21.77 KG/M2 | WEIGHT: 147 LBS | DIASTOLIC BLOOD PRESSURE: 85 MMHG | HEIGHT: 69 IN | TEMPERATURE: 98.2 F | SYSTOLIC BLOOD PRESSURE: 141 MMHG

## 2017-03-29 DIAGNOSIS — D22.9 ATYPICAL MOLE: Primary | ICD-10-CM

## 2017-03-29 PROCEDURE — 11302 SHAVE SKIN LESION 1.1-2.0 CM: CPT | Performed by: FAMILY MEDICINE

## 2017-03-29 PROCEDURE — 88305 TISSUE EXAM BY PATHOLOGIST: CPT | Performed by: FAMILY MEDICINE

## 2017-03-29 NOTE — NURSING NOTE
"Chief Complaint   Patient presents with     Mole     removal       Initial /85 (BP Location: Right arm, Patient Position: Chair, Cuff Size: Adult Regular)  Pulse 102  Temp 98.2  F (36.8  C) (Pulmonary Artery)  Ht 5' 8.5\" (1.74 m)  Wt 147 lb (66.7 kg)  BMI 22.03 kg/m2 Estimated body mass index is 22.03 kg/(m^2) as calculated from the following:    Height as of this encounter: 5' 8.5\" (1.74 m).    Weight as of this encounter: 147 lb (66.7 kg).  Medication Reconciliation: complete   Cindy Salinas MA      "

## 2017-03-29 NOTE — PROGRESS NOTES
Subjective: Carlos Faith a 82 year old male who presents today for lesion removal. The lesion(s) is/are located on the back, number 1 and measures 1.5cm He The patient reports the lesion is asymptomatic and denies other significant symptoms on ROS. Medications reviewed. It has been changing color and size     Pause for the cause has been completed prior to the prceedure.   1. Carlos was identified by both name and date of birth   2. The correct site was identified   3. Site was marked by provider    4. Written informed consent correct and signed or verbal authorization  to proceed was obtained   5. Verifed necessary supplies, equipment, and diagnostics are available    6. Time out was performed immediately prior to procedure    Objective: The lesion(s) is/are of the above mentioned size and location and is/are . The area was prepped and appropriately anesthetized. Using the usual technique, shave excision was performed. An appropriate dressing was applied. The procedure was well tolerated and without complications.     Assessment:     ICD-10-CM    1. Atypical mole D22.9 Surgical pathology exam         Plan: Follow up: The specimen is labelled and sent to pathology for evaluation.. Wound care instructions provided.  Patient was instructed to be alert for any signs of cutaneous infection.

## 2017-03-29 NOTE — MR AVS SNAPSHOT
After Visit Summary   3/29/2017    Carlos Faith    MRN: [id number]           Patient Information     Date Of Birth          1935        Visit Information        Provider Department      3/29/2017 1:20 PM Antoinette Bagley DO; JOSH SILVA  OTHER [facility]        Today's Diagnoses     Atypical mole    -  1      Care Instructions    Wound Care Instructions    1.  Keep area dry today.    2.  Starting tomorrow wash gently with soap and water once daily.      3.  Apply Vaseline or antibiotic ointment to the area and cover with a bandage if desired.  Do not let it dry out and form a scab as this will make the resulting scar more noticeable.    4. Protect the area from sun for up to one year afterward as the scar is continuing to remodel.  Sun exposure will also make the resulting scar more noticeable.    5.  Call if the area is very red, tender, has a discharge or is very itchy while healing, or if you have any other questions.  These may be signs of early infection or allergy.          Follow-ups after your visit        Your next 10 appointments already scheduled     Apr 11, 2017 10:30 AM CDT   New Visit [facility]an MD   [facility] [address] [phone]              Who to contact     If you have questions or need follow up information about today's clinic visit or your schedule please contact [facility] directly at [phone].  Normal or non-critical lab and imaging results will be communicated to you by MyChart, letter or phone within 4 business days after the clinic has received the results. If you do not hear from us within 7 days, please contact the clinic through MyChart or phone. If you have a critical or abnormal lab result, we will notify you by phone as soon as possible.  Submit refill requests through [employer] or call your pharmacy and they will  "forward the refill request to us. Please allow 3 business days for your refill to be completed.          Additional Information About Your Visit        Gen[employer] Information     [employer] Display System lets you send messages to your doctor, view your test results, renew your prescriptions, schedule appointments and more. To sign up, go to [email or web address] Display System . Click on \"Log in\" on the left side of the screen, which will take you to the Welcome page. Then click on \"Sign up Now\" on the right side of the page.     You will be asked to enter the access code listed below, as well as some personal information. Please follow the directions to create your username and password.     Your access code is: [email or web address]  Expires: 5/15/2017 12:50 PM     Your access code will  in 90 days. If you need help or a new code, please call your [place] clinic or [phone].        Care EveryWhere ID     This is your Care EveryWhere ID. This could be used by other organizations to access your [place] medical records  [id number]        Your Vitals Were     Pulse Temperature Height BMI (Body Mass Index)          102 98.2  F (36.8  C) (Pulmonary Artery) 5' 8.5\" (1.74 m) 22.03 kg/m2         Blood Pressure from Last 3 Encounters:   17 141/85   17 146/86   17 148/70    Weight from Last 3 Encounters:   17 147 lb (66.7 kg)   17 149 lb 6.4 oz (67.8 kg)   17 147 lb (66.7 kg)              We Performed the Following     BIOPSY SKIN/SUBQ/MUC MEM, EACH ADDTL LESION     Surgical pathology exam          Today's Medication Changes          These changes are accurate as of: 3/29/17  3:39 PM.  If you have any questions, ask your nurse or doctor.               Stop taking these medicines if you haven't already. Please contact your care team if you have questions.     ciprofloxacin 250 MG tablet   Commonly known as:  CIPRO   Stopped by:  Atnoinette Bagley,                     Primary Care Provider Office Phone # Fax # "    Efetimmydennise Fairnelidanilo Bagley [phone]       [facility] [address] [facility] [address]        Thank you!     Thank you for choosing [facility]  for your care. Our goal is always to provide you with excellent care. Hearing back from our patients is one way we can continue to improve our services. Please take a few minutes to complete the written survey that you may receive in the mail after your visit with us. Thank you!             Your Updated Medication List - Protect others around you: Learn how to safely use, store and throw away your medicines at www.disposemymeds.org.          This list is accurate as of: 3/29/17  3:39 PM.  Always use your most recent med list.                   Brand Name Dispense Instructions for use    amitriptyline 25 MG tablet    ELAVIL    90 tablet    Take 1 tablet (25 mg) by mouth At Bedtime       cephALEXin 500 MG capsule    KEFLEX    28 capsule    Take 1 capsule (500 mg) by mouth 2 times daily for 14 days       glucosamine-chondroitin 500-400 MG Caps per capsule      Take 1 capsule by mouth daily       order for DME     1 Box    Equipment being ordered: Straight Cath  - box, size and length per patient preference       simvastatin 5 MG tablet    ZOCOR    180 tablet    Take 2 tablets (10 mg) by mouth At Bedtime **Due for office visit for further refills**       SLO-NIACIN PO

## 2017-03-31 LAB — COPATH REPORT: NORMAL

## 2017-03-31 NOTE — PROGRESS NOTES
Your pathology test for skin biopsy is negative for cancer  Skin cancer prevention still advised with sun block and continue close monitoring    Antoinette Bagley D.O

## 2017-04-03 ENCOUNTER — TRANSFERRED RECORDS (OUTPATIENT)
Dept: HEALTH INFORMATION MANAGEMENT | Facility: CLINIC | Age: 82
End: 2017-04-03

## 2017-04-11 ENCOUNTER — OFFICE VISIT (OUTPATIENT)
Dept: UROLOGY | Facility: CLINIC | Age: 82
End: 2017-04-11
Payer: COMMERCIAL

## 2017-04-11 VITALS — DIASTOLIC BLOOD PRESSURE: 84 MMHG | SYSTOLIC BLOOD PRESSURE: 136 MMHG | HEART RATE: 72 BPM | RESPIRATION RATE: 16 BRPM

## 2017-04-11 DIAGNOSIS — N39.0 RECURRENT UTI: Primary | ICD-10-CM

## 2017-04-11 DIAGNOSIS — Z96.0 HISTORY OF PENILE IMPLANT: ICD-10-CM

## 2017-04-11 DIAGNOSIS — Z85.46 H/O PROSTATE CANCER: ICD-10-CM

## 2017-04-11 PROCEDURE — 99204 OFFICE O/P NEW MOD 45 MIN: CPT | Performed by: UROLOGY

## 2017-04-11 NOTE — PATIENT INSTRUCTIONS
Your cystoscopy is scheduled 5/8/2017 @ 8:30am. No preparation necessary. Please call  if you need to reschedule this appointment. Please complete your CT Scan sometime prior to your appointment for cystoscopy.   Please call  to schedule the CT scan at [facility], [address] [place] [address].

## 2017-04-11 NOTE — PROGRESS NOTES
SUBJECTIVE:  Carlos Faith is a pleasant 82-year-old male who was requested to be seen by Dr. Antoinette Bagley for a consultation with regard to patient's recurrent urinary tract infections.  The patient has had symptoms of recurrent urinary tract infections since October of last year.  Symptoms are frequency, urgency, cloudy urine.  He did have blood in the urine at one time.  He has been on several courses of antibiotics.  He did feel better after antibiotics; however, after he finished antibiotics symptoms would come back again.  His urine culture in 10/2016 showed mixed bacteria, in 2017 Proteus mirabilis, in 2017 he had E. coli again.  He has no fever, nausea or vomiting or any back pain.  He has history of prostate cancer, status post radical prostatectomy a number of years ago.  He also has history of penile prosthesis which is no longer working.  He has no history of kidney stones or kidney diseases in the past.  He has no bowel issues.      ASSESSMENT:  Pleasant 82-year-old gentleman with history of recurrent urinary tract infections, status post radical prostatectomy in the past.  There is a Proteus mirabilis found in one of the urine tests.      RECOMMENDATION:  Will schedule the patient for CT scan stone protocol, cystoscopy.  I suspect he might have a staghorn calculus causing these infections.         REVA RUEDA MD             D: 2017 11:00   T: 2017 11:12   MT: aliza      Name:     CARLOS FAITH   MRN:      [id number]        Account:      [id number]   :      1935           Visit Date:   2017      Document: [id number]       cc: Antoinette Bagley DO

## 2017-04-11 NOTE — MR AVS SNAPSHOT
"              After Visit Summary   4/11/2017    Carlos Faith    MRN: [id number]           Patient Information     Date Of Birth          1935        Visit Information        Provider Department      4/11/2017 10:30 AM Juanito Vaughan MD [facility]        Care Instructions    Your cystoscopy is scheduled 5/8/2017 @ 8:30am. No preparation necessary. Please call  if you need to reschedule this appointment. Please complete your CT Scan sometime prior to your appointment for cystoscopy.   Please call  to schedule the CT scan at [facility]/[address].          Follow-ups after your visit        Your next 10 appointments already scheduled     May 08, 2017  8:30 AM CDT   Return Visit with Juanito Vaughan MD, [facility] CYSTO PROC ROOM   [facility] ([address] [address] [phone]              Who to contact     If you have questions or need follow up information about today's clinic visit or your schedule please contact [facility] directly at [phone].  Normal or non-critical lab and imaging results will be communicated to you by MyChart, letter or phone within 4 business days after the clinic has received the results. If you do not hear from us within 7 days, please contact the clinic through MyChart or phone. If you have a critical or abnormal lab result, we will notify you by phone as soon as possible.  Submit refill requests through [employer] or call your pharmacy and they will forward the refill request to us. Please allow 3 business days for your refill to be completed.          Additional Information About Your Visit        MyChart Information     [employer] lets you send messages to your doctor, view your test results, renew your prescriptions, schedule appointments and more. To sign up, go to [email or web address] . Click on \"Log in\" on " "the left side of the screen, which will take you to the Welcome page. Then click on \"Sign up Now\" on the right side of the page.     You will be asked to enter the access code listed below, as well as some personal information. Please follow the directions to create your username and password.     Your access code is: [email or web address]  Expires: 5/15/2017 12:50 PM     Your access code will  in 90 days. If you need help or a new code, please call your [place] clinic or [phone].        Care EveryWhere ID     This is your Care EveryWhere ID. This could be used by other organizations to access your [place] medical records  [id number]        Your Vitals Were     Pulse Respirations                72 16           Blood Pressure from Last 3 Encounters:   17 136/84   17 141/85   17 146/86    Weight from Last 3 Encounters:   17 66.7 kg (147 lb)   17 67.8 kg (149 lb 6.4 oz)   17 66.7 kg (147 lb)              Today, you had the following     No orders found for display       Primary Care Provider Office Phone # Fax #    Antoinette Bagley [phone]       [address] [address]        Thank you!     Thank you for choosing [facility] [place]  for your care. Our goal is always to provide you with excellent care. Hearing back from our patients is one way we can continue to improve our services. Please take a few minutes to complete the written survey that you may receive in the mail after your visit with us. Thank you!             Your Updated Medication List - Protect others around you: Learn how to safely use, store and throw away your medicines at www.disposemymeds.org.          This list is accurate as of: 17 10:44 AM.  Always use your most recent med list.                   Brand Name Dispense Instructions for use    amitriptyline 25 MG tablet    ELAVIL    90 tablet    Take 1 tablet (25 mg) by mouth " At Bedtime       glucosamine-chondroitin 500-400 MG Caps per capsule      Take 1 capsule by mouth daily       OMEPRAZOLE PO          order for DME     1 Box    Equipment being ordered: Straight Cath  - box, size and length per patient preference       simvastatin 5 MG tablet    ZOCOR    180 tablet    Take 2 tablets (10 mg) by mouth At Bedtime **Due for office visit for further refills**       SLO-NIACIN PO

## 2017-04-11 NOTE — NURSING NOTE
"Chief Complaint   Patient presents with     Consult     abnormal urine       Initial /84 (BP Location: Right arm, Patient Position: Chair, Cuff Size: Adult Regular)  Pulse 72  Resp 16 Estimated body mass index is 22.03 kg/(m^2) as calculated from the following:    Height as of 3/29/17: 1.74 m (5' 8.5\").    Weight as of 3/29/17: 66.7 kg (147 lb).  Medication Reconciliation: complete    "

## 2017-04-11 NOTE — PROGRESS NOTES
S: See dictated note   Current Outpatient Prescriptions   Medication Sig Dispense Refill     OMEPRAZOLE PO        order for DME Equipment being ordered: Straight Cath  - box, size and length per patient preference 1 Box 1     glucosamine-chondroitin 500-400 MG CAPS Take 1 capsule by mouth daily       simvastatin (ZOCOR) 5 MG tablet Take 2 tablets (10 mg) by mouth At Bedtime **Due for office visit for further refills** 180 tablet 3     amitriptyline (ELAVIL) 25 MG tablet Take 1 tablet (25 mg) by mouth At Bedtime 90 tablet 2     SLO-NIACIN PO        Allergies   Allergen Reactions     Sulfa Drugs Rash     Past Medical History:   Diagnosis Date     Anxiety      Duodenal ulcer      Prostate cancer (H) 1994     Past Surgical History:   Procedure Laterality Date      penile implant  1994     HERNIA REPAIR, INGUINAL RT/LT       PROSTATE SURGERY  1994    Prostatectomy      Family History   Problem Relation Age of Onset     Coronary Artery Disease Father      83 MI     Prostate Cancer Paternal Grandfather      Social History     Social History     Marital status:      Spouse name: N/A     Number of children: N/A     Years of education: N/A     Social History Main Topics     Smoking status: Never Smoker     Smokeless tobacco: Never Used     Alcohol use 0.0 oz/week     0 Standard drinks or equivalent per week      Comment: Wine     Drug use: No     Sexual activity: Not Currently     Partners: Female     Other Topics Concern     Parent/Sibling W/ Cabg, Mi Or Angioplasty Before 65f 55m? No     Social History Narrative       REVIEW OF SYSTEMS  =================  C: NEGATIVE for fever, chills, change in weight  I: NEGATIVE for worrisome rashes, moles or lesions  E/M: NEGATIVE for ear, mouth and throat problems  R: NEGATIVE for significant cough or SHORTNESS OF BREATH  CV:  NEGATIVE for chest pain, palpitations or peripheral edema  GI: NEGATIVE for nausea, abdominal pain, heartburn, or change in bowel habits  NEURO: NEGATIVE  numbness/weakness  : see HPI  PSYCH: NEGATIVE depression/anxiety  LYmph: no new enlarged lymph nodes  Ortho: no new trauma/movements      Physical Exam:  /84 (BP Location: Right arm, Patient Position: Chair, Cuff Size: Adult Regular)  Pulse 72  Resp 16   Patient is pleasant, in no acute distress, good general condition.  HEENT:  Normalcephalic, atraumatic  Lung: no evidence of respiratory distress    Abdomen: Soft, nondistended, non tender. No masses. No rebound or guarding.   Exam: no cva tenderness.  Penis with penile implant not working.  Testis no masses.  No scrotal skin lesion.    Skin: Warm and dry.  No redness.  Neuro: grossly normal  Psych normal mood and affect  Musculoskeletal  moving all extremities    Assessment/Plan:   See dictated note

## 2017-04-18 ENCOUNTER — RADIANT APPOINTMENT (OUTPATIENT)
Dept: CT IMAGING | Facility: CLINIC | Age: 82
End: 2017-04-18
Attending: UROLOGY
Payer: COMMERCIAL

## 2017-04-18 DIAGNOSIS — N39.0 RECURRENT UTI: ICD-10-CM

## 2017-04-18 PROCEDURE — 74176 CT ABD & PELVIS W/O CONTRAST: CPT | Mod: TC

## 2017-05-08 ENCOUNTER — OFFICE VISIT (OUTPATIENT)
Dept: UROLOGY | Facility: CLINIC | Age: 82
End: 2017-05-08
Payer: COMMERCIAL

## 2017-05-08 VITALS — SYSTOLIC BLOOD PRESSURE: 166 MMHG | RESPIRATION RATE: 98 BRPM | HEART RATE: 85 BPM | DIASTOLIC BLOOD PRESSURE: 82 MMHG

## 2017-05-08 DIAGNOSIS — N39.0 RECURRENT UTI: Primary | ICD-10-CM

## 2017-05-08 DIAGNOSIS — N21.0 BLADDER STONES: ICD-10-CM

## 2017-05-08 DIAGNOSIS — R03.0 ELEVATED BLOOD PRESSURE READING WITHOUT DIAGNOSIS OF HYPERTENSION: ICD-10-CM

## 2017-05-08 DIAGNOSIS — N32.0 BLADDER NECK CONTRACTURE: ICD-10-CM

## 2017-05-08 PROCEDURE — 52000 CYSTOURETHROSCOPY: CPT | Performed by: UROLOGY

## 2017-05-08 PROCEDURE — 99207 ZZC DROP WITH A PROCEDURE: CPT | Mod: 25 | Performed by: UROLOGY

## 2017-05-08 NOTE — MR AVS SNAPSHOT
"              After Visit Summary   5/8/2017    Carlos Faith    MRN: [id number]           Patient Information     Date Of Birth          1935        Visit Information        Provider Department      5/8/2017 8:30 AM Juanito Vaughan MD; [place] CYSTO PROC ROOM [facility]        Today's Diagnoses     Recurrent UTI    -  1    Bladder stones        Bladder neck contracture        Elevated blood pressure reading without diagnosis of hypertension           Follow-ups after your visit        Your next 10 appointments already scheduled     Jun 01, 2017   Procedure with Juanito Vaughan MD   [facility] (--)    [address] [place] [address] [phone]              Who to contact     If you have questions or need follow up information about today's clinic visit or your schedule please contact [facility] directly at [phone].  Normal or non-critical lab and imaging results will be communicated to you by MyChart, letter or phone within 4 business days after the clinic has received the results. If you do not hear from us within 7 days, please contact the clinic through MyChart or phone. If you have a critical or abnormal lab result, we will notify you by phone as soon as possible.  Submit refill requests through [employer] or call your pharmacy and they will forward the refill request to us. Please allow 3 business days for your refill to be completed.          Additional Information About Your Visit        MyChart Information     [employer] lets you send messages to your doctor, view your test results, renew your prescriptions, schedule appointments and more. To sign up, go to [email or web address] . Click on \"Log in\" on the left side of the screen, which will take you to the Welcome page. Then click on \"Sign up Now\" on the right side of the page.     You will be asked to enter the access code listed below, as well as some personal information. " Please follow the directions to create your username and password.     Your access code is: [email or web address]  Expires: 5/15/2017 12:50 PM     Your access code will  in 90 days. If you need help or a new code, please call your [place] clinic or [phone].        Care EveryWhere ID     This is your Care EveryWhere ID. This could be used by other organizations to access your [place] medical records  [id number]        Your Vitals Were     Pulse Respirations                85 98           Blood Pressure from Last 3 Encounters:   17 166/82   17 136/84   17 141/85    Weight from Last 3 Encounters:   17 66.7 kg (147 lb)   17 67.8 kg (149 lb 6.4 oz)   17 66.7 kg (147 lb)              We Performed the Following     CYSTOURETHROSCOPY        Primary Care Provider Office Phone # Fax #    Antoinette Bagley [phone]       [address] [address]        Thank you!     Thank you for choosing [facility] [place]  for your care. Our goal is always to provide you with excellent care. Hearing back from our patients is one way we can continue to improve our services. Please take a few minutes to complete the written survey that you may receive in the mail after your visit with us. Thank you!             Your Updated Medication List - Protect others around you: Learn how to safely use, store and throw away your medicines at www.disposemymeds.org.          This list is accurate as of: 17  8:54 AM.  Always use your most recent med list.                   Brand Name Dispense Instructions for use    amitriptyline 25 MG tablet    ELAVIL    90 tablet    Take 1 tablet (25 mg) by mouth At Bedtime       glucosamine-chondroitin 500-400 MG Caps per capsule      Take 1 capsule by mouth daily       OMEPRAZOLE PO          order for DME     1 Box    Equipment being ordered: Straight Cath  - box, size and length per patient  preference       simvastatin 5 MG tablet    ZOCOR    180 tablet    Take 2 tablets (10 mg) by mouth At Bedtime **Due for office visit for further refills**       SLO-NIACIN PO

## 2017-05-08 NOTE — PROGRESS NOTES
S: Carlos Faith is a 82 year old male returns for recurrent uti    Patient is draped and prepped.  Flexible cystoscopy placed under direct vision.      The anterior urethra is normal   The prostate is missing.  BNC contracture about 10 F.  CT ABDOMEN/PELVIS WITHOUT CONTRAST April 18, 2017 2:22 PM     HISTORY: Urinary tract infection, site not specified.    TECHNIQUE: Noncontrast CT abdomen and pelvis was performed. Radiation  dose for this scan was reduced using automated exposure control,  adjustment of the mA and/or kV according to patient size, or iterative  reconstruction technique.    COMPARISON: None.    FINDINGS:   Right kidney: No hydronephrosis. There are two tiny nonobstructing  intrarenal stones at the upper right kidney that are each 0.2 cm  series 2 image 29 and 30. No focal right renal parenchymal lesion. The  distal right ureter is obscured by metallic artifact from previous  surgery in the pelvis.    Left kidney: Nonobstructing small stone lower left kidney is 0.2 cm  series 2 image 47. Left lower renal cyst is noted adjacent to this  region. No hydronephrosis or convincing ureter stone. The left distal  ureter is also obscured by metallic streak artifact in the pelvis.    Urinary bladder: Several bladder stones are identified. One of the  larger stones superiorly is 1.2 cm series 2 image 105. There are  proximally five additional detected stones more inferiorly at the  posterior bladder lumen. Hiatal hernia is identified. Fat-containing  hernia at the medial posterior right chest base noted. Unenhanced  liver, gallbladder, adrenals, spleen, and pancreas do not show any  concerning abnormalities. Left-sided adrenal nodular thickening with  hypodensity suggests an adenoma. No bowel obstruction. A penile  prosthesis incidentally noted.             Impression             IMPRESSION:  1. Multiple bladder stones identified. Largest stone is approximately  1.2 cm.  2. A few tiny nonobstructing  intrarenal stones bilaterally. No  hydronephrosis. Although there is no convincing ureter stone, the  distal ureters are obscured by streak artifact at the pelvis.  3. Hiatal hernia.    KAL MCGILL MD        Assessment/Plan:  (N39.0) Recurrent UTI  (primary encounter diagnosis)  Comment:    Plan: bladder stones seen.  Most likely reason for UTIs.    (N21.0) Bladder stones  Comment:    Plan:  Schedule for removal    (N32.0) Bladder neck contracture  Comment:    Plan: schedule for dilation      HTN: Patient to follow up with Primary Care provider regarding elevated blood pressure.

## 2017-05-08 NOTE — NURSING NOTE
"Chief Complaint   Patient presents with     Cystoscopy       Initial /82 (BP Location: Left arm, Patient Position: Chair, Cuff Size: Adult Regular)  Pulse 85  Resp (!) 98 Estimated body mass index is 22.03 kg/(m^2) as calculated from the following:    Height as of 3/29/17: 1.74 m (5' 8.5\").    Weight as of 3/29/17: 66.7 kg (147 lb).  Medication Reconciliation: complete   Lacy Witt, ROLY        "

## 2017-05-22 ENCOUNTER — OFFICE VISIT (OUTPATIENT)
Dept: FAMILY MEDICINE | Facility: CLINIC | Age: 82
End: 2017-05-22
Payer: COMMERCIAL

## 2017-05-22 VITALS
SYSTOLIC BLOOD PRESSURE: 132 MMHG | DIASTOLIC BLOOD PRESSURE: 86 MMHG | TEMPERATURE: 97.6 F | HEIGHT: 69 IN | WEIGHT: 144 LBS | HEART RATE: 86 BPM | BODY MASS INDEX: 21.33 KG/M2

## 2017-05-22 DIAGNOSIS — Z13.29 SCREENING FOR THYROID DISORDER: ICD-10-CM

## 2017-05-22 DIAGNOSIS — Z85.46 H/O PROSTATE CANCER: ICD-10-CM

## 2017-05-22 DIAGNOSIS — Z01.818 PREOP GENERAL PHYSICAL EXAM: Primary | ICD-10-CM

## 2017-05-22 DIAGNOSIS — R42 DIZZINESS: ICD-10-CM

## 2017-05-22 LAB — HGB BLD-MCNC: 15.2 G/DL (ref 13.3–17.7)

## 2017-05-22 PROCEDURE — 80048 BASIC METABOLIC PNL TOTAL CA: CPT | Performed by: FAMILY MEDICINE

## 2017-05-22 PROCEDURE — 36415 COLL VENOUS BLD VENIPUNCTURE: CPT | Performed by: FAMILY MEDICINE

## 2017-05-22 PROCEDURE — 99214 OFFICE O/P EST MOD 30 MIN: CPT | Performed by: FAMILY MEDICINE

## 2017-05-22 PROCEDURE — 93000 ELECTROCARDIOGRAM COMPLETE: CPT | Performed by: FAMILY MEDICINE

## 2017-05-22 PROCEDURE — 85018 HEMOGLOBIN: CPT | Performed by: FAMILY MEDICINE

## 2017-05-22 NOTE — MR AVS SNAPSHOT
After Visit Summary   5/22/2017    Carlos Faith    MRN: [id number]           Patient Information     Date Of Birth          1935        Visit Information        Provider Department      5/22/2017 2:20 PM Antoinette Bagley DO [facility]        Today's Diagnoses     Preop general physical exam    -  1    Dizziness        H/O prostate cancer        Screening for thyroid disorder          Care Instructions      Before Your Surgery      Call your surgeon if there is any change in your health. This includes signs of a cold or flu (such as a sore throat, runny nose, cough, rash or fever).    Do not smoke, drink alcohol or take over the counter medicine (unless your surgeon or primary care doctor tells you to) for the 24 hours before and after surgery.    If you take prescribed drugs: Follow your doctor s orders about which medicines to take and which to stop until after surgery.    Eating and drinking prior to surgery: follow the instructions from your surgeon    Take a shower or bath the night before surgery. Use the soap your surgeon gave you to gently clean your skin. If you do not have soap from your surgeon, use your regular soap. Do not shave or scrub the surgery site.  Wear clean pajamas and have clean sheets on your bed.         Follow-ups after your visit        Your next 10 appointments already scheduled     Jun 01, 2017   Procedure with Juanito Vaughan MD   [facility] (--)    [address] [place] [id number] [phone]            Jun 01, 2017 12:00 PM CDT   XR SURGERY SELINA FLUORO L/T 5 MIN with [id number]   [facility] ([facility]    [address] [place] [id number] [phone]           Please bring a list of your current medicines to your exam. (Include vitamins, minerals and over-thecounter medicines.) Leave your valuables at home.  Tell your doctor if there is a chance you  "may be pregnant.  You do not need to do anything special for this exam.              Who to contact     If you have questions or need follow up information about today's clinic visit or your schedule please contact [facility] directly at [phone].  Normal or non-critical lab and imaging results will be communicated to you by MyChart, letter or phone within 4 business days after the clinic has received the results. If you do not hear from us within 7 days, please contact the clinic through My [employer] or phone. If you have a critical or abnormal lab result, we will notify you by phone as soon as possible.  Submit refill requests through [employer] or call your pharmacy and they will forward the refill request to us. Please allow 3 business days for your refill to be completed.          Additional Information About Your Visit        MyCharMeddle Information     [employer] lets you send messages to your doctor, view your test results, renew your prescriptions, schedule appointments and more. To sign up, go to [email or web address] . Click on \"Log in\" on the left side of the screen, which will take you to the Welcome page. Then click on \"Sign up Now\" on the right side of the page.     You will be asked to enter the access code listed below, as well as some personal information. Please follow the directions to create your username and password.     Your access code is: [email or web address]  Expires: 2017  7:49 AM     Your access code will  in 90 days. If you need help or a new code, please call your [facility] clinic or [phone].        Care EveryWhere ID     This is your Care EveryWhere ID. This could be used by other organizations to access your [facility] medical records  [id number]        Your Vitals Were     Pulse Temperature Height BMI (Body Mass Index)          86 97.6  F (36.4  C) (Oral) 5' 8.5\" (1.74 m) 21.58 kg/m2         Blood Pressure from Last 3 Encounters:   17 132/86   17 166/82 "   04/11/17 136/84    Weight from Last 3 Encounters:   05/22/17 144 lb (65.3 kg)   03/29/17 147 lb (66.7 kg)   03/22/17 149 lb 6.4 oz (67.8 kg)              We Performed the Following     Basic metabolic panel     EKG 12-lead complete w/read - Clinics     Hemoglobin        Primary Care Provider Office Phone # Fax #    Antoinette Bagley [phone]       [facility] [address] [facility] [address]        Thank you!     Thank you for choosing [facility]  for your care. Our goal is always to provide you with excellent care. Hearing back from our patients is one way we can continue to improve our services. Please take a few minutes to complete the written survey that you may receive in the mail after your visit with us. Thank you!             Your Updated Medication List - Protect others around you: Learn how to safely use, store and throw away your medicines at www.disposemymeds.org.          This list is accurate as of: 5/22/17 11:59 PM.  Always use your most recent med list.                   Brand Name Dispense Instructions for use    amitriptyline 25 MG tablet    ELAVIL    90 tablet    Take 1 tablet (25 mg) by mouth At Bedtime       glucosamine-chondroitin 500-400 MG Caps per capsule      Take 1 capsule by mouth daily       OMEPRAZOLE PO          order for DME     1 Box    Equipment being ordered: Straight Cath  - box, size and length per patient preference       simvastatin 5 MG tablet    ZOCOR    180 tablet    Take 2 tablets (10 mg) by mouth At Bedtime **Due for office visit for further refills**       SLO-NIACIN PO

## 2017-05-22 NOTE — LETTER
[facility]  [address] [facility] [id number] [phone]      June 5, 2017      Carlos Faith  [address]   [place] [address]          Dear James Faith    The results of your recent lab tests were within normal limits. Enclosed is a copy of these results.  If you have any further questions or problems, please contact our office.    Sincerely,      Antoinette Bagley, DO/hl    Results for orders placed or performed in visit on 05/22/17   Hemoglobin   Result Value Ref Range    Hemoglobin 15.2 13.3 - 17.7 g/dL   Basic metabolic panel   Result Value Ref Range    Sodium 138 133 - 144 mmol/L    Potassium 4.2 3.4 - 5.3 mmol/L    Chloride 105 94 - 109 mmol/L    Carbon Dioxide 27 20 - 32 mmol/L    Anion Gap 6 3 - 14 mmol/L    Glucose 96 70 - 99 mg/dL    Urea Nitrogen 24 7 - 30 mg/dL    Creatinine 1.15 0.66 - 1.25 mg/dL    GFR Estimate 61 >60 mL/min/1.7m2    GFR Estimate If Black 74 >60 mL/min/1.7m2    Calcium 9.4 8.5 - 10.1 mg/dL

## 2017-05-22 NOTE — NURSING NOTE
"Chief Complaint   Patient presents with     Pre-Op Exam     DOS: 6/1/2017       Initial /86  Pulse 86  Temp 97.6  F (36.4  C) (Oral)  Ht 5' 8.5\" (1.74 m)  Wt 144 lb (65.3 kg)  BMI 21.58 kg/m2 Estimated body mass index is 21.58 kg/(m^2) as calculated from the following:    Height as of this encounter: 5' 8.5\" (1.74 m).    Weight as of this encounter: 144 lb (65.3 kg).  Medication Reconciliation: complete   Liz Anders CMA (AAMA)      "

## 2017-05-22 NOTE — PROGRESS NOTES
[address] [id number] [phone]  Dept: [phone]    PRE-OP EVALUATION:  Today's date: 2017    Carlos Faith (: 1935) presents for pre-operative evaluation assessment as requested by Dr. Vaughan.  He requires evaluation and anesthesia risk assessment prior to undergoing surgery/procedure for treatment of bladder .  Proposed procedure: combined cystoscopy    Date of Surgery/ Procedure: 2017  Time of Surgery/ Procedure: 12 pm  Hospital/Surgical Facility: [facility]    Primary Physician: Antoinette Bagley  Type of Anesthesia Anticipated: General    Patient has a Health Care Directive or Living Will:  YES     1. NO - Do you have a history of heart attack, stroke, stent, bypass or surgery on an artery in the head, neck, heart or legs?  2. NO - Do you ever have any pain or discomfort in your chest?  3. NO - Do you have a history of  Heart Failure?  4. NO - Are you troubled by shortness of breath when: walking on the level, up a slight hill or at night?  5. NO - Do you currently have a cold, bronchitis or other respiratory infection?  6. NO - Do you have a cough, shortness of breath or wheezing?  7. NO - Do you sometimes get pains in the calves of your legs when you walk?  8. NO - Do you or anyone in your family have previous history of blood clots?  9. NO - Do you or does anyone in your family have a serious bleeding problem such as prolonged bleeding following surgeries or cuts?  10. NO - Have you ever had problems with anemia or been told to take iron pills?  11. NO - Have you had any abnormal blood loss such as black, tarry or bloody stools, or abnormal vaginal bleeding?  12. YES - HAVE YOU EVER HAD A BLOOD TRANSFUSION?   13. NO - Have you or any of your relatives ever had problems with anesthesia?  14. NO - Do you have sleep apnea, excessive snoring or daytime drowsiness?  15. NO - Do you have any prosthetic heart  valves?  16. NO - Do you have prosthetic joints?  17. NO - Is there any chance that you may be pregnant?      HPI:                                                      Brief HPI related to upcoming procedure: patient with history of prostate cancer s/p treatment many years ago who has been having recurrent UTI most likely due to bladder neck stricture and bladder stones.     Dizziness at a  last week (his young niece  in MVA) was so traumatic for him.  He has not had dizziness since then, he does do more that 4 mets of activity without symptoms. No chest pain, no sob, no palpitations, plays 2-3 hours of golf daily, no issues, very active at home      MEDICAL HISTORY:                                                      Patient Active Problem List    Diagnosis Date Noted     Gastroesophageal reflux disease without esophagitis 05/10/2016     Priority: Medium     H/O prostate cancer 01/15/2016     Priority: Medium     At the age of 58-had radicatal prostitectomy-had done well no chemo, since not seen urologist for many years       History of penile implant 01/15/2016     Priority: Medium     Mixed hyperlipidemia 01/15/2016     Priority: Medium     Adjustment disorder with anxious mood 01/15/2016     Priority: Medium      Past Medical History:   Diagnosis Date     Anxiety      Duodenal ulcer      Prostate cancer (H)      Past Surgical History:   Procedure Laterality Date      penile implant       HERNIA REPAIR, INGUINAL RT/LT       PROSTATE SURGERY      Prostatectomy     Current Outpatient Prescriptions   Medication Sig Dispense Refill     OMEPRAZOLE PO        order for DME Equipment being ordered: Straight Cath  - box, size and length per patient preference 1 Box 1     glucosamine-chondroitin 500-400 MG CAPS Take 1 capsule by mouth daily       simvastatin (ZOCOR) 5 MG tablet Take 2 tablets (10 mg) by mouth At Bedtime **Due for office visit for further refills** 180 tablet 3     amitriptyline  "(ELAVIL) 25 MG tablet Take 1 tablet (25 mg) by mouth At Bedtime 90 tablet 2     SLO-NIACIN PO        OTC products: None, except as noted above    Allergies   Allergen Reactions     Sulfa Drugs Rash      Latex Allergy: NO    Social History   Substance Use Topics     Smoking status: Never Smoker     Smokeless tobacco: Never Used     Alcohol use 0.0 oz/week     0 Standard drinks or equivalent per week      Comment: Wine     History   Drug Use No       REVIEW OF SYSTEMS:                                                    C: NEGATIVE for fever, chills, change in weight  E/M: NEGATIVE for ear, mouth and throat problems  R: NEGATIVE for significant cough or SOB  CV: NEGATIVE for chest pain, palpitations or peripheral edema    EXAM:                                                    /86  Pulse 86  Temp 97.6  F (36.4  C) (Oral)  Ht 5' 8.5\" (1.74 m)  Wt 144 lb (65.3 kg)  BMI 21.58 kg/m2    GENERAL APPEARANCE: healthy, alert and no distress     EYES: EOMI, PERRL     HENT: ear canals and TM's normal and nose and mouth without ulcers or lesions     NECK: no adenopathy, no asymmetry, masses, or scars and thyroid normal to palpation     RESP: lungs clear to auscultation - no rales, rhonchi or wheezes     CV: regular rates and rhythm, normal S1 S2, no S3 or S4 and no murmur, click or rub     ABDOMEN:  soft, nontender, no HSM or masses and bowel sounds normal     MS: extremities normal- no gross deformities noted, no evidence of inflammation in joints, FROM in all extremities.     SKIN: no suspicious lesions or rashes     NEURO: Normal strength and tone, sensory exam grossly normal, mentation intact and speech normal     PSYCH: mentation appears normal. and affect normal/bright     LYMPHATICS: No axillary, cervical, or supraclavicular nodes    DIAGNOSTICS:                                                    EKG: appears normal, NSR, normal axis, normal intervals, no acute ST/T changes c/w ischemia, no LVH by voltage " "criteria, unchanged from previous tracings    Recent Labs   Lab Test  10/04/16   1445 07/03/15   HGB  14.3   --    NA  140   --    POTASSIUM  4.2  4.6   CR  1.10  1.30        IMPRESSION:                                                    Reason for surgery/procedure: urinary track infections, bladder neck contracture, bladder stones  Diagnosis/reason for consult:    The proposed surgical procedure is considered LOW risk.    REVISED CARDIAC RISK INDEX  The patient has the following serious cardiovascular risks for perioperative complications such as (MI, PE, VFib and 3  AV Block):  No serious cardiac risks  INTERPRETATION: 0 risks: Class I (very low risk - 0.4% complication rate)    The patient has the following additional risks for perioperative complications:  No identified additional risks      ICD-10-CM    1. Preop general physical exam Z01.818 EKG 12-lead complete w/read - Clinics   2. Dizziness R42 EKG 12-lead complete w/read - Clinics     Hemoglobin     Basic metabolic panel   3. H/O prostate cancer Z85.46    4. Screening for thyroid disorder Z13.29    UTI/bladder stones-per urology  Dizziness -one episode last week at a -no other sx, EKG stable for acute changes, no  Recurring sx, he does do more than 4 mets of exercise without sx  Declined stress test ever as he knows people who \" from it\"    RECOMMENDATIONS:                                                      --Consult hospital rounder / IM to assist post-op medical management    --Patient is to take all scheduled medications on the day of surgery EXCEPT for modifications listed below.    APPROVAL GIVEN to proceed with proposed procedure, without further diagnostic evaluation         Signed Electronically by: Antoinette Bagley DO    Copy of this evaluation report is provided to requesting physician.    [facility] Preop Guidelines  "

## 2017-05-23 LAB
ANION GAP SERPL CALCULATED.3IONS-SCNC: 6 MMOL/L (ref 3–14)
BUN SERPL-MCNC: 24 MG/DL (ref 7–30)
CALCIUM SERPL-MCNC: 9.4 MG/DL (ref 8.5–10.1)
CHLORIDE SERPL-SCNC: 105 MMOL/L (ref 94–109)
CO2 SERPL-SCNC: 27 MMOL/L (ref 20–32)
CREAT SERPL-MCNC: 1.15 MG/DL (ref 0.66–1.25)
GFR SERPL CREATININE-BSD FRML MDRD: 61 ML/MIN/1.7M2
GLUCOSE SERPL-MCNC: 96 MG/DL (ref 70–99)
POTASSIUM SERPL-SCNC: 4.2 MMOL/L (ref 3.4–5.3)
SODIUM SERPL-SCNC: 138 MMOL/L (ref 133–144)

## 2017-05-31 ENCOUNTER — ANESTHESIA EVENT (OUTPATIENT)
Dept: SURGERY | Facility: AMBULATORY SURGERY CENTER | Age: 82
End: 2017-05-31

## 2017-06-01 ENCOUNTER — ANESTHESIA (OUTPATIENT)
Dept: SURGERY | Facility: AMBULATORY SURGERY CENTER | Age: 82
End: 2017-06-01

## 2017-06-01 ENCOUNTER — SURGERY (OUTPATIENT)
Age: 82
End: 2017-06-01

## 2017-06-01 ENCOUNTER — HOSPITAL ENCOUNTER (OUTPATIENT)
Facility: AMBULATORY SURGERY CENTER | Age: 82
Discharge: HOME | End: 2017-06-01
Attending: UROLOGY | Admitting: UROLOGY
Payer: COMMERCIAL

## 2017-06-01 VITALS
RESPIRATION RATE: 16 BRPM | SYSTOLIC BLOOD PRESSURE: 125 MMHG | DIASTOLIC BLOOD PRESSURE: 74 MMHG | OXYGEN SATURATION: 95 % | TEMPERATURE: 97.3 F

## 2017-06-01 DIAGNOSIS — N21.0 BLADDER STONES: Primary | ICD-10-CM

## 2017-06-01 PROCEDURE — 52317 REMOVE BLADDER STONE: CPT

## 2017-06-01 PROCEDURE — G8907 PT DOC NO EVENTS ON DISCHARG: HCPCS

## 2017-06-01 PROCEDURE — 52317 REMOVE BLADDER STONE: CPT | Performed by: UROLOGY

## 2017-06-01 PROCEDURE — G8916 PT W IV AB GIVEN ON TIME: HCPCS

## 2017-06-01 RX ORDER — MEPERIDINE HYDROCHLORIDE 25 MG/ML
12.5 INJECTION INTRAMUSCULAR; INTRAVENOUS; SUBCUTANEOUS
Status: DISCONTINUED | OUTPATIENT
Start: 2017-06-01 | End: 2017-06-02 | Stop reason: HOSPADM

## 2017-06-01 RX ORDER — FENTANYL CITRATE 50 UG/ML
25-50 INJECTION, SOLUTION INTRAMUSCULAR; INTRAVENOUS
Status: DISCONTINUED | OUTPATIENT
Start: 2017-06-01 | End: 2017-06-02 | Stop reason: HOSPADM

## 2017-06-01 RX ORDER — ACETAMINOPHEN 325 MG/1
975 TABLET ORAL ONCE
Status: DISCONTINUED | OUTPATIENT
Start: 2017-06-01 | End: 2017-06-02 | Stop reason: HOSPADM

## 2017-06-01 RX ORDER — HYDROMORPHONE HYDROCHLORIDE 1 MG/ML
.3-.5 INJECTION, SOLUTION INTRAMUSCULAR; INTRAVENOUS; SUBCUTANEOUS EVERY 10 MIN PRN
Status: DISCONTINUED | OUTPATIENT
Start: 2017-06-01 | End: 2017-06-02 | Stop reason: HOSPADM

## 2017-06-01 RX ORDER — ONDANSETRON 2 MG/ML
4 INJECTION INTRAMUSCULAR; INTRAVENOUS EVERY 30 MIN PRN
Status: DISCONTINUED | OUTPATIENT
Start: 2017-06-01 | End: 2017-06-02 | Stop reason: HOSPADM

## 2017-06-01 RX ORDER — SODIUM CHLORIDE, SODIUM LACTATE, POTASSIUM CHLORIDE, CALCIUM CHLORIDE 600; 310; 30; 20 MG/100ML; MG/100ML; MG/100ML; MG/100ML
INJECTION, SOLUTION INTRAVENOUS CONTINUOUS
Status: DISCONTINUED | OUTPATIENT
Start: 2017-06-01 | End: 2017-06-02 | Stop reason: HOSPADM

## 2017-06-01 RX ORDER — NALOXONE HYDROCHLORIDE 0.4 MG/ML
.1-.4 INJECTION, SOLUTION INTRAMUSCULAR; INTRAVENOUS; SUBCUTANEOUS
Status: DISCONTINUED | OUTPATIENT
Start: 2017-06-01 | End: 2017-06-02 | Stop reason: HOSPADM

## 2017-06-01 RX ORDER — CIPROFLOXACIN 500 MG/1
500 TABLET, FILM COATED ORAL 2 TIMES DAILY
Qty: 6 TABLET | Refills: 0 | Status: SHIPPED | OUTPATIENT
Start: 2017-06-01 | End: 2017-06-04

## 2017-06-01 RX ORDER — ONDANSETRON 4 MG/1
4 TABLET, ORALLY DISINTEGRATING ORAL EVERY 30 MIN PRN
Status: DISCONTINUED | OUTPATIENT
Start: 2017-06-01 | End: 2017-06-02 | Stop reason: HOSPADM

## 2017-06-01 RX ORDER — DEXAMETHASONE SODIUM PHOSPHATE 4 MG/ML
4 INJECTION, SOLUTION INTRA-ARTICULAR; INTRALESIONAL; INTRAMUSCULAR; INTRAVENOUS; SOFT TISSUE EVERY 10 MIN PRN
Status: DISCONTINUED | OUTPATIENT
Start: 2017-06-01 | End: 2017-06-02 | Stop reason: HOSPADM

## 2017-06-01 RX ORDER — ACETAMINOPHEN 325 MG/1
975 TABLET ORAL ONCE
Status: COMPLETED | OUTPATIENT
Start: 2017-06-01 | End: 2017-06-01

## 2017-06-01 RX ORDER — ONDANSETRON 2 MG/ML
INJECTION INTRAMUSCULAR; INTRAVENOUS PRN
Status: DISCONTINUED | OUTPATIENT
Start: 2017-06-01 | End: 2017-06-01

## 2017-06-01 RX ORDER — PROPOFOL 10 MG/ML
INJECTION, EMULSION INTRAVENOUS CONTINUOUS PRN
Status: DISCONTINUED | OUTPATIENT
Start: 2017-06-01 | End: 2017-06-01

## 2017-06-01 RX ORDER — PROPOFOL 10 MG/ML
INJECTION, EMULSION INTRAVENOUS PRN
Status: DISCONTINUED | OUTPATIENT
Start: 2017-06-01 | End: 2017-06-01

## 2017-06-01 RX ORDER — LIDOCAINE HYDROCHLORIDE 20 MG/ML
INJECTION, SOLUTION INFILTRATION; PERINEURAL PRN
Status: DISCONTINUED | OUTPATIENT
Start: 2017-06-01 | End: 2017-06-01

## 2017-06-01 RX ORDER — LIDOCAINE 40 MG/G
CREAM TOPICAL
Status: DISCONTINUED | OUTPATIENT
Start: 2017-06-01 | End: 2017-06-02 | Stop reason: HOSPADM

## 2017-06-01 RX ORDER — CEFAZOLIN SODIUM 2 G/100ML
2 INJECTION, SOLUTION INTRAVENOUS
Status: COMPLETED | OUTPATIENT
Start: 2017-06-01 | End: 2017-06-01

## 2017-06-01 RX ORDER — FENTANYL CITRATE 50 UG/ML
25-50 INJECTION, SOLUTION INTRAMUSCULAR; INTRAVENOUS EVERY 5 MIN PRN
Status: DISCONTINUED | OUTPATIENT
Start: 2017-06-01 | End: 2017-06-02 | Stop reason: HOSPADM

## 2017-06-01 RX ORDER — ALBUTEROL SULFATE 0.83 MG/ML
2.5 SOLUTION RESPIRATORY (INHALATION) EVERY 4 HOURS PRN
Status: DISCONTINUED | OUTPATIENT
Start: 2017-06-01 | End: 2017-06-02 | Stop reason: HOSPADM

## 2017-06-01 RX ORDER — FENTANYL CITRATE 50 UG/ML
INJECTION, SOLUTION INTRAMUSCULAR; INTRAVENOUS PRN
Status: DISCONTINUED | OUTPATIENT
Start: 2017-06-01 | End: 2017-06-01

## 2017-06-01 RX ADMIN — FENTANYL CITRATE 25 MCG: 50 INJECTION, SOLUTION INTRAMUSCULAR; INTRAVENOUS at 12:17

## 2017-06-01 RX ADMIN — PROPOFOL 100 MCG/KG/MIN: 10 INJECTION, EMULSION INTRAVENOUS at 12:15

## 2017-06-01 RX ADMIN — SODIUM CHLORIDE, SODIUM LACTATE, POTASSIUM CHLORIDE, CALCIUM CHLORIDE: 600; 310; 30; 20 INJECTION, SOLUTION INTRAVENOUS at 12:14

## 2017-06-01 RX ADMIN — CEFAZOLIN SODIUM 2 G: 2 INJECTION, SOLUTION INTRAVENOUS at 12:17

## 2017-06-01 RX ADMIN — ONDANSETRON 4 MG: 2 INJECTION INTRAMUSCULAR; INTRAVENOUS at 12:32

## 2017-06-01 RX ADMIN — SODIUM CHLORIDE, SODIUM LACTATE, POTASSIUM CHLORIDE, CALCIUM CHLORIDE: 600; 310; 30; 20 INJECTION, SOLUTION INTRAVENOUS at 12:59

## 2017-06-01 RX ADMIN — PROPOFOL 30 MG: 10 INJECTION, EMULSION INTRAVENOUS at 12:30

## 2017-06-01 RX ADMIN — LIDOCAINE HYDROCHLORIDE 40 MG: 20 INJECTION, SOLUTION INFILTRATION; PERINEURAL at 12:15

## 2017-06-01 RX ADMIN — PROPOFOL 50 MG: 10 INJECTION, EMULSION INTRAVENOUS at 12:15

## 2017-06-01 RX ADMIN — ACETAMINOPHEN 975 MG: 325 TABLET ORAL at 11:20

## 2017-06-01 NOTE — DISCHARGE INSTRUCTIONS
[facility]  Same-Day Surgery   Adult Discharge Orders & Instructions   For 24 hours after surgery  1. Get plenty of rest.  A responsible adult must stay with you for at least 24 hours after you leave the hospital.   2. Do not drive or use heavy equipment.  If you have weakness or tingling, don't drive or use heavy equipment until this feeling goes away.  3. Do not drink alcohol.  4. Avoid strenuous or risky activities.  Ask for help when climbing stairs.   5. You may feel lightheaded.  IF so, sit for a few minutes before standing.  Have someone help you get up.   6. If you have nausea (feel sick to your stomach): Drink only clear liquids such as apple juice, ginger ale, broth or 7-Up.  Rest may also help.  Be sure to drink enough fluids.  Move to a regular diet as you feel able.  7. You may have a slight fever. Call the doctor if your fever is over 100 F (37.7 C) (taken under the tongue) or lasts longer than 24 hours.  8. You may have a dry mouth, a sore throat, muscle aches or trouble sleeping.  These should go away after 24 hours.  9. Do not make important or legal decisions.   Call your doctor for any of the followin.  Signs of infection (fever, growing tenderness at the surgery site, a large amount of drainage or bleeding, severe pain, foul-smelling drainage, redness, swelling).    2. It has been over 8 to 10 hours since surgery and you are still not able to urinate (pass water).    3.  Headache for over 24 hours.    To contact a doctor call:  [phone]

## 2017-06-01 NOTE — IP AVS SNAPSHOT
[facility]    [address] [place] [address]    Phone:  [phone]                                       After Visit Summary   6/1/2017    Carlos Faith    MRN: [id number]           After Visit Summary Signature Page     I have received my discharge instructions, and my questions have been answered. I have discussed any challenges I see with this plan with the nurse or doctor.    ..........................................................................................................................................  Patient/Patient Representative Signature      ..........................................................................................................................................  Patient Representative Print Name and Relationship to Patient    ..................................................               ................................................  Date                                            Time    ..........................................................................................................................................  Reviewed by Signature/Title    ...................................................              ..............................................  Date                                                            Time

## 2017-06-01 NOTE — BRIEF OP NOTE
[facility]  Brief Operative Note    Pre-operative diagnosis: Bladder neck contracture/bladder stones   Post-operative diagnosis same   Procedure: Cysto and balloon dilation of BNC  Cysto and litholapaxy/holmium lithotripsy   Surgeon: Juanito Vaughan MD   Assistants(s): None   Anesthesia: Local with MAC    Estimated blood loss: minimal                   Specimens: None   Implants: None       Complications: None   Condition on discharge: Stable   Comments: See dictated operative report for full details

## 2017-06-01 NOTE — ANESTHESIA PREPROCEDURE EVALUATION
Anesthesia Evaluation     . Pt has had prior anesthetic.     No history of anesthetic complications          ROS/MED HX    ENT/Pulmonary:  - neg pulmonary ROS     Neurologic:  - neg neurologic ROS     Cardiovascular:     (+) Dyslipidemia, ----. : . . . :. . Previous cardiac testing date:results:date: results:ECG reviewed date: results:NSR date: results:          METS/Exercise Tolerance:  4 - Raking leaves, gardening   Hematologic:  - neg hematologic  ROS       Musculoskeletal:  - neg musculoskeletal ROS       GI/Hepatic:     (+) GERD Other GI/Hepatic duodenal ulcer      Renal/Genitourinary:         Endo:  - neg endo ROS       Psychiatric:     (+) psychiatric history anxiety      Infectious Disease:  - neg infectious disease ROS       Malignancy:   (+) Malignancy History of Prostate  Prostate CA status post Surgery,         Other:    - neg other ROS                 Physical Exam  Normal systems: cardiovascular, pulmonary and dental    Airway   Mallampati: I  TM distance: >3 FB  Neck ROM: full    Dental     Cardiovascular   Rhythm and rate: regular and normal  (+) murmur       Pulmonary    breath sounds clear to auscultation    Other findings: 3/5 systolic murmur  Has been told he has had it in the past  Patient plays pickleball for 3 hours x5 a week  No issues with SOB or chest pain with activity                Anesthesia Plan      History & Physical Review  History and physical reviewed and following examination; no interval change.    ASA Status:  2 .    NPO Status:  > 8 hours    Plan for General and LMA with Intravenous and Propofol induction. Maintenance will be Balanced.    PONV prophylaxis:  Ondansetron (or other 5HT-3) and Dexamethasone or Solumedrol       Postoperative Care  Postoperative pain management:  Multi-modal analgesia.      Consents  Anesthetic plan, risks, benefits and alternatives discussed with:  Patient.  Use of blood products discussed: No .   .                          .

## 2017-06-01 NOTE — ANESTHESIA POSTPROCEDURE EVALUATION
Patient: Carlos Faith    Procedure(s):  Cysto, urethral ballon dilation and Holmium laser Lithotripsy - Wound Class: II-Clean Contaminated  Possible Holmium laser for bladder stones - Wound Class: II-Clean Contaminated    Diagnosis:bladder stones, bladder neck contracture  Diagnosis Additional Information: No value filed.    Anesthesia Type:  General, LMA    Note:  Anesthesia Post Evaluation    Patient location during evaluation: Phase 2  Patient participation: Able to fully participate in evaluation  Level of consciousness: awake and alert  Pain management: adequate  Airway patency: patent  Cardiovascular status: acceptable  Respiratory status: acceptable  Hydration status: acceptable  PONV: none     Anesthetic complications: None          Last vitals:  Vitals:    06/01/17 1309 06/01/17 1315 06/01/17 1330   BP: 116/66 126/71 125/74   Resp: 18 18 16   Temp:      SpO2: 96% 95% 95%         Electronically Signed By: Amarjit Gomez DO  June 1, 2017  1:39 PM

## 2017-06-01 NOTE — IP AVS SNAPSHOT
MRN:[id number]                      After Visit Summary   6/1/2017    Carlos Faith    MRN: [id number]           Thank you!     Thank you for choosing [facility] for your care. Our goal is always to provide you with excellent care. Hearing back from our patients is one way we can continue to improve our services. Please take a few minutes to complete the written survey that you may receive in the mail after you visit with us. Thank you!        Patient Information     Date Of Birth          1935        About your hospital stay     You were admitted on:  June 1, 2017 You last received care in the:  [facility]    You were discharged on:  June 1, 2017       Who to Call     For medical emergencies, please call 911.  For non-urgent questions about your medical care, please call your primary care provider or clinic, [phone]  For questions related to your surgery, please call your surgery clinic        Attending Provider     Provider Juanito Cortez MD Urology       Primary Care Provider Office Phone # Fax #    Antoinette Bagley [phone]      After Care Instructions     Diet Instructions       Resume pre procedure diet            Discharge Instructions       Patient to arrange for follow up appointment in 3-4 months            Encourage fluids       Encourage fluids at home to keep urine clear to light pink            No Alcohol       for 24 hours post procedure            No driving or operating machinery        until the day after procedure                  Further instructions from your care team       [facility]  Same-Day Surgery   Adult Discharge Orders & Instructions   For 24 hours after surgery  1. Get plenty of rest.  A responsible adult must stay with you for at least 24 hours after you leave the hospital.   2. Do not drive or use heavy equipment.  If you have weakness or tingling, don't drive or  "use heavy equipment until this feeling goes away.  3. Do not drink alcohol.  4. Avoid strenuous or risky activities.  Ask for help when climbing stairs.   5. You may feel lightheaded.  IF so, sit for a few minutes before standing.  Have someone help you get up.   6. If you have nausea (feel sick to your stomach): Drink only clear liquids such as apple juice, ginger ale, broth or 7-Up.  Rest may also help.  Be sure to drink enough fluids.  Move to a regular diet as you feel able.  7. You may have a slight fever. Call the doctor if your fever is over 100 F (37.7 C) (taken under the tongue) or lasts longer than 24 hours.  8. You may have a dry mouth, a sore throat, muscle aches or trouble sleeping.  These should go away after 24 hours.  9. Do not make important or legal decisions.   Call your doctor for any of the followin.  Signs of infection (fever, growing tenderness at the surgery site, a large amount of drainage or bleeding, severe pain, foul-smelling drainage, redness, swelling).    2. It has been over 8 to 10 hours since surgery and you are still not able to urinate (pass water).    3.  Headache for over 24 hours.    To contact a doctor call:  [phone]      Pending Results     No orders found from 2017 to 2017.            Admission Information     Date & Time Provider Department Dept. Phone    2017 Juanito Vaughan MD [facility] [phone]      Your Vitals Were     Blood Pressure Temperature Respirations Pulse Oximetry          97/60 97.3  F (36.3  C) (Temporal) 18 97%        MyChart Information     [employer] lets you send messages to your doctor, view your test results, renew your prescriptions, schedule appointments and more. To sign up, go to [email or web address] . Click on \"Log in\" on the left side of the screen, which will take you to the Welcome page. Then click on \"Sign up Now\" on the right side of the page.     You will be asked to enter the access code " listed below, as well as some personal information. Please follow the directions to create your username and password.     Your access code is: [email or web address]  Expires: 2017  7:49 AM     Your access code will  in 90 days. If you need help or a new code, please call your [facility] clinic or [phone].        Care EveryWhere ID     This is your Care EveryWhere ID. This could be used by other organizations to access your [facility] medical records  [id number]           Review of your medicines      UNREVIEWED medicines. Ask your doctor about these medicines        Dose / Directions    amitriptyline 25 MG tablet   Commonly known as:  ELAVIL   Used for:  Adjustment disorder with anxious mood        Dose:  25 mg   Take 1 tablet (25 mg) by mouth At Bedtime   Quantity:  90 tablet   Refills:  2       glucosamine-chondroitin 500-400 MG Caps per capsule        Dose:  1 capsule   Take 1 capsule by mouth daily   Refills:  0       OMEPRAZOLE PO        Refills:  0       simvastatin 5 MG tablet   Commonly known as:  ZOCOR   Used for:  Mixed hyperlipidemia        Dose:  10 mg   Take 2 tablets (10 mg) by mouth At Bedtime **Due for office visit for further refills**   Quantity:  180 tablet   Refills:  3       SLO-NIACIN PO        Refills:  0         START taking        Dose / Directions    ciprofloxacin 500 MG tablet   Commonly known as:  CIPRO   Used for:  Bladder stones        Dose:  500 mg   Take 1 tablet (500 mg) by mouth 2 times daily for 3 days Start day of procedure   Quantity:  6 tablet   Refills:  0         CONTINUE these medicines which have NOT CHANGED        Dose / Directions    order for DME   Used for:  Urinary tract infection, site unspecified        Equipment being ordered: Straight Cath  - box, size and length per patient preference   Quantity:  1 Box   Refills:  1            Where to get your medicines      These medications were sent to [facility] - [place], [place] - [address],  [address]  [address] [address] [place] [address] [place], [address], [facility] [address]     Phone:  [phone]     ciprofloxacin 500 MG tablet                Protect others around you: Learn how to safely use, store and throw away your medicines at www.disposemymeds.org.             Medication List: This is a list of all your medications and when to take them. Check marks below indicate your daily home schedule. Keep this list as a reference.      Medications           Morning Afternoon Evening Bedtime As Needed    amitriptyline 25 MG tablet   Commonly known as:  ELAVIL   Take 1 tablet (25 mg) by mouth At Bedtime                                ciprofloxacin 500 MG tablet   Commonly known as:  CIPRO   Take 1 tablet (500 mg) by mouth 2 times daily for 3 days Start day of procedure                                glucosamine-chondroitin 500-400 MG Caps per capsule   Take 1 capsule by mouth daily                                OMEPRAZOLE PO                                order for DME   Equipment being ordered: Straight Cath  - box, size and length per patient preference                                simvastatin 5 MG tablet   Commonly known as:  ZOCOR   Take 2 tablets (10 mg) by mouth At Bedtime **Due for office visit for further refills**                                SLO-NIACIN PO

## 2017-06-01 NOTE — ANESTHESIA CARE TRANSFER NOTE
Patient: Carlos Faith    Procedure(s):  Cysto, urethral ballon dilation and Holmium laser Lithotripsy - Wound Class: II-Clean Contaminated  Possible Holmium laser for bladder stones - Wound Class: II-Clean Contaminated    Diagnosis: bladder stones, bladder neck contracture  Diagnosis Additional Information: No value filed.    Anesthesia Type:   General, LMA     Note:  Airway :Nasal Cannula  Patient transferred to:Phase II  Comments: To Phase II, awake, comfortable, sats 99%, Report to RN.      Vitals: (Last set prior to Anesthesia Care Transfer)    CRNA VITALS  6/1/2017 1229 - 6/1/2017 1305      6/1/2017             Pulse: 86    SpO2: 99 %                Electronically Signed By: FLORENTINO Wolff CRNA  June 1, 2017  1:05 PM

## 2017-06-01 NOTE — OP NOTE
DATE OF PROCEDURE:  06/1/2017       PREOPERATIVE DIAGNOSIS:  Bladder neck contracture/bladder stones      POSTOPERATIVE DIAGNOSIS:  same      PROCEDURE:  Cystoscopy and balloon dilation of urethral stricture. Litholapaxy with holmium lithotripsy and stone removal.      DESCRIPTION OF PROCEDURE:  Patient was brought to the operating room.  After sedation was induced, he was placed supine.  The patient was draped and prepped in the usual surgical fashion.  Preoperative antibiotics were given.  A flexible cystoscope was placed in the urethra where the stricture was identified at the bladder neck.  A  wire was placed through the stricture into the bladder under fluoroscopy.  After this was done, a 30-Thai UroMax balloon was then placed and the stricture was dilated under fluoroscopy.  After this, the balloon and the wire was removed and the cystoscope was placed into the urethra and bladder. The bladder showed multiple bladder stones.  I used a stone  and broke up these stones.  I then used holmium laser and broke up bigger stones.  These stones were then evacuated..  The patient tolerated the procedure well.  There were no complications identified during the procedure.  There was minimal bleeding during the operation.           REVA RUEDA MD

## 2017-07-07 ENCOUNTER — OFFICE VISIT (OUTPATIENT)
Dept: UROLOGY | Facility: CLINIC | Age: 82
End: 2017-07-07
Payer: COMMERCIAL

## 2017-07-07 VITALS — HEART RATE: 68 BPM | SYSTOLIC BLOOD PRESSURE: 126 MMHG | RESPIRATION RATE: 14 BRPM | DIASTOLIC BLOOD PRESSURE: 74 MMHG

## 2017-07-07 DIAGNOSIS — N32.0 BLADDER NECK CONTRACTURE: Primary | ICD-10-CM

## 2017-07-07 PROCEDURE — 51798 US URINE CAPACITY MEASURE: CPT | Performed by: UROLOGY

## 2017-07-07 PROCEDURE — 99213 OFFICE O/P EST LOW 20 MIN: CPT | Mod: 25 | Performed by: UROLOGY

## 2017-07-07 NOTE — NURSING NOTE
"Chief Complaint   Patient presents with     Surgical Followup     post op        Initial /74 (BP Location: Right arm, Patient Position: Chair, Cuff Size: Adult Regular)  Pulse 68  Resp 14 Estimated body mass index is 21.58 kg/(m^2) as calculated from the following:    Height as of 5/22/17: 1.74 m (5' 8.5\").    Weight as of 5/22/17: 65.3 kg (144 lb).  Medication Reconciliation: complete   Lacy Witt CMA      "

## 2017-07-07 NOTE — PROGRESS NOTES
Chief Complaint   Patient presents with     Surgical Followup     post op        Carlos Faith is a 82 year old male who presents today for follow up of   Chief Complaint   Patient presents with     Surgical Followup     post op     f/u post BNC dilation and bladder stones removal. He is without any complaints.  His flow is much better.  He is getting anymore bladder infections.    Current Outpatient Prescriptions   Medication Sig Dispense Refill     OMEPRAZOLE PO        order for DME Equipment being ordered: Straight Cath  - box, size and length per patient preference 1 Box 1     glucosamine-chondroitin 500-400 MG CAPS Take 1 capsule by mouth daily       simvastatin (ZOCOR) 5 MG tablet Take 2 tablets (10 mg) by mouth At Bedtime **Due for office visit for further refills** 180 tablet 3     amitriptyline (ELAVIL) 25 MG tablet Take 1 tablet (25 mg) by mouth At Bedtime 90 tablet 2     SLO-NIACIN PO        Allergies   Allergen Reactions     Sulfa Drugs Rash      Past Medical History:   Diagnosis Date     Anxiety      Duodenal ulcer      Prostate cancer (H) 1994     Past Surgical History:   Procedure Laterality Date      penile implant  1994     CYSTOSCOPY, DILATE URETHRA, COMBINED N/A 6/1/2017    Procedure: COMBINED CYSTOSCOPY, DILATE URETHRA;  Cysto, urethral ballon dilation and Holmium laser Lithotripsy;  Surgeon: Juanito Vaughan MD;  Location: [facility] OR     HERNIA REPAIR, INGUINAL RT/LT       PROSTATE SURGERY  1994    Prostatectomy     Family History   Problem Relation Age of Onset     Coronary Artery Disease Father      83 MI     Prostate Cancer Paternal Grandfather      Social History     Social History     Marital status:      Spouse name: N/A     Number of children: N/A     Years of education: N/A     Social History Main Topics     Smoking status: Never Smoker     Smokeless tobacco: Never Used     Alcohol use 0.0 oz/week     0 Standard drinks or equivalent per week      Comment: Wine     Drug use: No      Sexual activity: Not Currently     Partners: Female     Other Topics Concern     Parent/Sibling W/ Cabg, Mi Or Angioplasty Before 65f 55m? No     Social History Narrative       REVIEW OF SYSTEMS  =================  C: NEGATIVE for fever, chills, change in weight  I: NEGATIVE for worrisome rashes, moles or lesions  E/M: NEGATIVE for ear, mouth and throat problems  R: NEGATIVE for significant cough or SHORTNESS OF BREATH,   CV: NEGATIVE for chest pain, palpitations or peripheral edema  GI: NEGATIVE for nausea, abdominal pain, heartburn, or change in bowel habits  NEURO: NEGATIVE any motor/sensory changes  PSYCH: NEGATIVE for recent mood disorder    Physical Exam:  /74 (BP Location: Right arm, Patient Position: Chair, Cuff Size: Adult Regular)  Pulse 68  Resp 14   Patient is pleasant, in no acute distress, good general condition.  Lung: no evidence of respiratory distress    Abdomen: Soft, nondistended, non tender. No masses. No rebound or guarding.   Exam: no cva tenderness.  Bladder scan 68 ml  Skin: Warm and dry.  No redness.  Psych: normal mood and affect  Neuro: alert and oriented    Assessment/Plan:   (N32.0) Bladder neck contracture  (primary encounter diagnosis)  Comment:    Plan: doing well post op.  F/u in one year.

## 2017-07-07 NOTE — MR AVS SNAPSHOT
"              After Visit Summary   2017    Carlos Faith    MRN: [id number]           Patient Information     Date Of Birth          1935        Visit Information        Provider Department      2017 11:30 AM Juanito Vaughan MD [facility]        Today's Diagnoses     Bladder neck contracture    -  1       Follow-ups after your visit        Who to contact     If you have questions or need follow up information about today's clinic visit or your schedule please contact [facility] directly at [phone].  Normal or non-critical lab and imaging results will be communicated to you by "[employer]hart, letter or phone within 4 business days after the clinic has received the results. If you do not hear from us within 7 days, please contact the clinic through "[employer]hart or phone. If you have a critical or abnormal lab result, we will notify you by phone as soon as possible.  Submit refill requests through [employer] or call your pharmacy and they will forward the refill request to us. Please allow 3 business days for your refill to be completed.          Additional Information About Your Visit        MyChart Information     [employer] lets you send messages to your doctor, view your test results, renew your prescriptions, schedule appointments and more. To sign up, go to [email or web address] . Click on \"Log in\" on the left side of the screen, which will take you to the Welcome page. Then click on \"Sign up Now\" on the right side of the page.     You will be asked to enter the access code listed below, as well as some personal information. Please follow the directions to create your username and password.     Your access code is: [email or web address]  Expires: 2017  7:49 AM     Your access code will  in 90 days. If you need help or a new code, please call your [facility] or [phone].        Care EveryWhere ID     This is your Care EveryWhere ID. This could be used by other " organizations to access your [facility] medical records  [id number]        Your Vitals Were     Pulse Respirations                68 14           Blood Pressure from Last 3 Encounters:   07/07/17 126/74   06/01/17 125/74   05/22/17 132/86    Weight from Last 3 Encounters:   05/22/17 65.3 kg (144 lb)   03/29/17 66.7 kg (147 lb)   03/22/17 67.8 kg (149 lb 6.4 oz)              We Performed the Following     MEASURE POST-VOID RESIDUAL URINE/BLADDER CAPACITY, US NON-IMAGING        Primary Care Provider Office Phone # Fax #    Antoinette Bagley [phone]       [address] [address]        Equal Access to Services     LAURA ROUSE : Galo ramirezo Sochristian, waaxda luqadaha, qaybta kaalmada adeegyada, elier henning. So [facility] [phone].    ATENCIÓN: Si habla español, tiene a stout disposición servicios gratuitos de asistencia lingüística. Llame al [phone].    We comply with applicable federal civil rights laws and [place] laws. We do not discriminate on the basis of race, color, national origin, age, disability sex, sexual orientation or gender identity.            Thank you!     Thank you for choosing [facility]  for your care. Our goal is always to provide you with excellent care. Hearing back from our patients is one way we can continue to improve our services. Please take a few minutes to complete the written survey that you may receive in the mail after your visit with us. Thank you!             Your Updated Medication List - Protect others around you: Learn how to safely use, store and throw away your medicines at www.disposemymeds.org.          This list is accurate as of: 7/7/17 12:18 PM.  Always use your most recent med list.                   Brand Name Dispense Instructions for use Diagnosis    amitriptyline 25 MG tablet    ELAVIL    90 tablet    Take 1 tablet (25 mg) by mouth At  Bedtime    Adjustment disorder with anxious mood       glucosamine-chondroitin 500-400 MG Caps per capsule      Take 1 capsule by mouth daily        OMEPRAZOLE PO           order for DME     1 Box    Equipment being ordered: Straight Cath  - box, size and length per patient preference    Urinary tract infection, site unspecified       simvastatin 5 MG tablet    ZOCOR    180 tablet    Take 2 tablets (10 mg) by mouth At Bedtime **Due for office visit for further refills**    Mixed hyperlipidemia       SLO-NIACIN PO

## 2017-08-21 DIAGNOSIS — F43.22 ADJUSTMENT DISORDER WITH ANXIOUS MOOD: ICD-10-CM

## 2017-08-21 NOTE — TELEPHONE ENCOUNTER
amitriptyline (ELAVIL) 25 MG tablet (Discontinued) 90 tablet 2 3/8/2016 10/4/2016 No      Sig: Take 1 tablet (25 mg) by mouth At Bedtime     Class: E-Prescribe     Route: Oral     Reason for Discontinue: Reorder     Order: [id number]       Last Office Visit with [facility], P or [facility] prescribing provider: 5/22/2017  Future Office visit:       Routing refill request to provider for review/approval because:  Drug not active on patient's medication list

## 2017-08-23 NOTE — TELEPHONE ENCOUNTER
Routing refill request to provider for review/approval because:  Labs not current:  ALT/AST    Melo Castillo RN

## 2017-10-19 DIAGNOSIS — E78.2 MIXED HYPERLIPIDEMIA: ICD-10-CM

## 2017-10-19 DIAGNOSIS — K21.00 GASTROESOPHAGEAL REFLUX DISEASE WITH ESOPHAGITIS: ICD-10-CM

## 2017-10-20 RX ORDER — OMEPRAZOLE 40 MG/1
CAPSULE, DELAYED RELEASE ORAL
Qty: 90 CAPSULE | Refills: 1 | Status: SHIPPED | OUTPATIENT
Start: 2017-10-20 | End: 2018-03-28

## 2017-10-20 RX ORDER — SIMVASTATIN 5 MG
10 TABLET ORAL AT BEDTIME
Qty: 60 TABLET | Refills: 0 | Status: SHIPPED | OUTPATIENT
Start: 2017-10-20 | End: 2017-11-28

## 2017-11-28 ENCOUNTER — OFFICE VISIT (OUTPATIENT)
Dept: FAMILY MEDICINE | Facility: CLINIC | Age: 82
End: 2017-11-28
Payer: COMMERCIAL

## 2017-11-28 VITALS
DIASTOLIC BLOOD PRESSURE: 76 MMHG | HEIGHT: 69 IN | BODY MASS INDEX: 21.03 KG/M2 | TEMPERATURE: 98.5 F | SYSTOLIC BLOOD PRESSURE: 132 MMHG | HEART RATE: 118 BPM | WEIGHT: 142 LBS

## 2017-11-28 DIAGNOSIS — E78.2 MIXED HYPERLIPIDEMIA: ICD-10-CM

## 2017-11-28 DIAGNOSIS — Z01.818 PRE-OP EXAM: Primary | ICD-10-CM

## 2017-11-28 PROCEDURE — 99214 OFFICE O/P EST MOD 30 MIN: CPT | Performed by: FAMILY MEDICINE

## 2017-11-28 RX ORDER — LATANOPROST 50 UG/ML
SOLUTION/ DROPS OPHTHALMIC
Refills: 1 | COMMUNITY
Start: 2017-09-13 | End: 2020-02-17

## 2017-11-28 NOTE — PATIENT INSTRUCTIONS
Before Your Surgery      Call your surgeon if there is any change in your health. This includes signs of a cold or flu (such as a sore throat, runny nose, cough, rash or fever).    Do not smoke, drink alcohol or take over the counter medicine (unless your surgeon or primary care doctor tells you to) for the 24 hours before and after surgery.    If you take prescribed drugs: Follow your doctor s orders about which medicines to take and which to stop until after surgery.    Eating and drinking prior to surgery: follow the instructions from your surgeon    Take a shower or bath the night before surgery. Use the soap your surgeon gave you to gently clean your skin. If you do not have soap from your surgeon, use your regular soap. Do not shave or scrub the surgery site.  Wear clean pajamas and have clean sheets on your bed.     -hold medications day of surgery

## 2017-11-28 NOTE — MR AVS SNAPSHOT
After Visit Summary   11/28/2017    Carlos Faith    MRN: [id number]           Patient Information     Date Of Birth          1935        Visit Information        Provider Department      11/28/2017 10:20 AM Ralph Perez MD [facility]        Today's Diagnoses     Pre-op exam    -  1      Care Instructions      Before Your Surgery      Call your surgeon if there is any change in your health. This includes signs of a cold or flu (such as a sore throat, runny nose, cough, rash or fever).    Do not smoke, drink alcohol or take over the counter medicine (unless your surgeon or primary care doctor tells you to) for the 24 hours before and after surgery.    If you take prescribed drugs: Follow your doctor s orders about which medicines to take and which to stop until after surgery.    Eating and drinking prior to surgery: follow the instructions from your surgeon    Take a shower or bath the night before surgery. Use the soap your surgeon gave you to gently clean your skin. If you do not have soap from your surgeon, use your regular soap. Do not shave or scrub the surgery site.  Wear clean pajamas and have clean sheets on your bed.     -hold medications day of surgery           Follow-ups after your visit        Who to contact     If you have questions or need follow up information about today's clinic visit or your schedule please contact [facility] directly at [phone].  Normal or non-critical lab and imaging results will be communicated to you by MyChart, letter or phone within 4 business days after the clinic has received the results. If you do not hear from us within 7 days, please contact the clinic through MyChart or phone. If you have a critical or abnormal lab result, we will notify you by phone as soon as possible.  Submit refill requests through [employer] or call your pharmacy and they will forward the refill request to us. Please allow 3  "business days for your refill to be completed.          Additional Information About Your Visit        MyChart Information     [employer] lets you send messages to your doctor, view your test results, renew your prescriptions, schedule appointments and more. To sign up, go to [email or web address] . Click on \"Log in\" on the left side of the screen, which will take you to the Welcome page. Then click on \"Sign up Now\" on the right side of the page.     You will be asked to enter the access code listed below, as well as some personal information. Please follow the directions to create your username and password.     Your access code is: [email or web address]  Expires: 2018  3:08 PM     Your access code will  in 90 days. If you need help or a new code, please call your [place] clinic or [phone].        Care EveryWhere ID     This is your Care EveryWhere ID. This could be used by other organizations to access your [place] medical records  [id number]        Your Vitals Were     Pulse Temperature Height BMI (Body Mass Index)          118 98.5  F (36.9  C) (Oral) 5' 8.5\" (1.74 m) 21.28 kg/m2         Blood Pressure from Last 3 Encounters:   17 132/76   17 126/74   17 125/74    Weight from Last 3 Encounters:   17 142 lb (64.4 kg)   17 144 lb (65.3 kg)   17 147 lb (66.7 kg)              Today, you had the following     No orders found for display       Primary Care Provider Office Phone # Fax #    Antoinette Tayla Bagley [phone]       [address] [address]        Equal Access to Services     LAURA ROUSE : Galo Morin, siria carrillo, roberto masnoalkajal naranjo, elier henning. So [facility] [phone].    ATENCIÓN: Si habla español, tiene a stout disposición servicios gratuitos de asistencia lingüística. Llame al [phone].    We comply with applicable federal civil rights laws and [place] laws. " We do not discriminate on the basis of race, color, national origin, age, disability, sex, sexual orientation, or gender identity.            Thank you!     Thank you for choosing [facility]  for your care. Our goal is always to provide you with excellent care. Hearing back from our patients is one way we can continue to improve our services. Please take a few minutes to complete the written survey that you may receive in the mail after your visit with us. Thank you!             Your Updated Medication List - Protect others around you: Learn how to safely use, store and throw away your medicines at www.disposemymeds.org.          This list is accurate as of: 11/28/17  3:08 PM.  Always use your most recent med list.                   Brand Name Dispense Instructions for use Diagnosis    amitriptyline 25 MG tablet    ELAVIL    90 tablet    Take 1 tablet (25 mg) by mouth At Bedtime    Adjustment disorder with anxious mood       glucosamine-chondroitin 500-400 MG Caps per capsule      Take 1 capsule by mouth daily        latanoprost 0.005 % ophthalmic solution    XALATAN     INSTILL 1 DROP INTO BOTH EYES EVERY EVENING AS DIRECTED        * OMEPRAZOLE PO           * omeprazole 40 MG capsule    priLOSEC    90 capsule    TAKE 1 CAPSULE DAILY 30-60 MINUTES BEFORE A MEAL    Gastroesophageal reflux disease with esophagitis       order for DME     1 Box    Equipment being ordered: Straight Cath  - box, size and length per patient preference    Urinary tract infection, site unspecified       simvastatin 5 MG tablet    ZOCOR    60 tablet    Take 2 tablets (10 mg) by mouth At Bedtime Due for labs for refills! You can walk into the labs anytime during business hours.    Mixed hyperlipidemia       SLO-NIACIN PO           TURMERIC PO      Take by mouth daily        * Notice:  This list has 2 medication(s) that are the same as other medications prescribed for you. Read the directions carefully, and ask your doctor  or other care provider to review them with you.

## 2017-11-28 NOTE — PROGRESS NOTES
[address] [id number] [phone]  Dept: [phone]    PRE-OP EVALUATION:  Today's date: 2017    Carlos Faith (: 1935) presents for pre-operative evaluation assessment as requested by Dr. Freed.  He requires evaluation and anesthesia risk assessment prior to undergoing surgery/procedure for treatment of cataracts .  Proposed procedure: cataracts    Date of Surgery/ Procedure: 17 and 17  Time of Surgery/ Procedure: 10:00am  Hospital/Surgical Facility: [facility]  Fax number for surgical facility: [phone]  Primary Physician: Antoinette Bagley  Type of Anesthesia Anticipated: to be determined    Patient has a Health Care Directive or Living Will:  YES     1. NO - Do you have a history of heart attack, stroke, stent, bypass or surgery on an artery in the head, neck, heart or legs?  2. NO - Do you ever have any pain or discomfort in your chest?  3. NO - Do you have a history of  Heart Failure?  4. NO - Are you troubled by shortness of breath when: walking on the level, up a slight hill or at night?  5. NO - Do you currently have a cold, bronchitis or other respiratory infection?  6. NO - Do you have a cough, shortness of breath or wheezing?  7. NO - Do you sometimes get pains in the calves of your legs when you walk?  8. NO - Do you or anyone in your family have previous history of blood clots?  9. NO - Do you or does anyone in your family have a serious bleeding problem such as prolonged bleeding following surgeries or cuts?  10. NO - Have you ever had problems with anemia or been told to take iron pills?  11. NO - Have you had any abnormal blood loss such as black, tarry or bloody stools, or abnormal vaginal bleeding?  12. YES - HAVE YOU EVER HAD A BLOOD TRANSFUSION? , intraoperative blood loss during prostatectomy 22 yrs ago.   13. NO - Have you or any of your relatives ever had problems with  anesthesia?  14. NO - Do you have sleep apnea, excessive snoring or daytime drowsiness?  15. NO - Do you have any prosthetic heart valves?  16. NO - Do you have prosthetic joints?  17. NO - Is there any chance that you may be pregnant?        HPI:                                                      Brief HPI related to upcoming procedure: loss of visual acuity with increased nearsightedness, and poor nighttime vision.        See problem list for active medical problems.  Problems all longstanding and stable, except as noted/documented.  See ROS for pertinent symptoms related to these conditions.                                                                                                  .    MEDICAL HISTORY:                                                    Patient Active Problem List    Diagnosis Date Noted     Gastroesophageal reflux disease without esophagitis 05/10/2016     Priority: Medium     H/O prostate cancer 01/15/2016     Priority: Medium     At the age of 58-had radicatal prostitectomy-had done well no chemo, since not seen urologist for many years       History of penile implant 01/15/2016     Priority: Medium     Mixed hyperlipidemia 01/15/2016     Priority: Medium     Adjustment disorder with anxious mood 01/15/2016     Priority: Medium      Past Medical History:   Diagnosis Date     Anxiety      Duodenal ulcer      Prostate cancer (H) 1994     Past Surgical History:   Procedure Laterality Date      penile implant  1994     CYSTOSCOPY, DILATE URETHRA, COMBINED N/A 6/1/2017    Procedure: COMBINED CYSTOSCOPY, DILATE URETHRA;  Cysto, urethral ballon dilation and Holmium laser Lithotripsy;  Surgeon: Juanito Vaughan MD;  Location: [facility] OR     HERNIA REPAIR, INGUINAL RT/LT       PROSTATE SURGERY  1994    Prostatectomy     Current Outpatient Prescriptions   Medication Sig Dispense Refill     TURMERIC PO Take by mouth daily       latanoprost (XALATAN) 0.005 % ophthalmic solution INSTILL 1 DROP INTO  "BOTH EYES EVERY EVENING AS DIRECTED  1     omeprazole (PRILOSEC) 40 MG capsule TAKE 1 CAPSULE DAILY 30-60 MINUTES BEFORE A MEAL 90 capsule 1     simvastatin (ZOCOR) 5 MG tablet Take 2 tablets (10 mg) by mouth At Bedtime Due for labs for refills! You can walk into the labs anytime during business hours. 60 tablet 0     amitriptyline (ELAVIL) 25 MG tablet Take 1 tablet (25 mg) by mouth At Bedtime 90 tablet 2     OMEPRAZOLE PO        order for DME Equipment being ordered: Straight Cath  - box, size and length per patient preference 1 Box 1     glucosamine-chondroitin 500-400 MG CAPS Take 1 capsule by mouth daily       SLO-NIACIN PO        OTC products: None, except as noted above    Allergies   Allergen Reactions     Sulfa Drugs Rash      Latex Allergy: NO    Social History   Substance Use Topics     Smoking status: Never Smoker     Smokeless tobacco: Never Used     Alcohol use 0.0 oz/week     0 Standard drinks or equivalent per week      Comment: Wine     History   Drug Use No       REVIEW OF SYSTEMS:                                                    Constitutional, neuro, ENT, endocrine, pulmonary, cardiac, gastrointestinal, genitourinary, musculoskeletal, integument and psychiatric systems are negative, except as otherwise noted.    This document serves as a record of the services and decisions personally performed and made by Christopher Perez MD. It was created on their behalf by Kt Barbosa, a trained medical scribe. The creation of this document is based the provider's statements to the medical scribe.  Kt Barbosa November 28, 2017 10:55 AM         EXAM:                                                    /76 (Cuff Size: Adult Regular)  Pulse 118  Temp 98.5  F (36.9  C) (Oral)  Ht 5' 8.5\" (1.74 m)  Wt 142 lb (64.4 kg)  BMI 21.28 kg/m2    GENERAL APPEARANCE: healthy, alert and no distress     EYES: EOMI,  PERRL     HENT: ear canals and TM's normal and nose and mouth without ulcers or lesions     NECK: no " adenopathy, no asymmetry, masses, or scars and thyroid normal to palpation     RESP: lungs clear to auscultation - no rales, rhonchi or wheezes     CV: regular rates and rhythm, normal S1 S2, no S3 or S4 and no murmur, click or rub     ABDOMEN:  soft, nontender, no HSM or masses and bowel sounds normal     MS: extremities normal- no gross deformities noted, no evidence of inflammation in joints, FROM in all extremities.     SKIN: no suspicious lesions or rashes     NEURO: Normal strength and tone, sensory exam grossly normal, mentation intact and speech normal     PSYCH: mentation appears normal. and affect normal/bright    DIAGNOSTICS:                                                    No labs or EKG required for low risk surgery (cataract, skin procedure, breast biopsy, etc)    Recent Labs   Lab Test  05/22/17   1448  10/04/16   1445   HGB  15.2  14.3   NA  138  140   POTASSIUM  4.2  4.2   CR  1.15  1.10        IMPRESSION:                                                    Reason for surgery/procedure: removal of cataracts  Diagnosis/reason for consult: clearance    The proposed surgical procedure is considered LOW risk.    REVISED CARDIAC RISK INDEX  The patient has the following serious cardiovascular risks for perioperative complications such as (MI, PE, VFib and 3  AV Block):  No serious cardiac risks  INTERPRETATION: 0 risks: Class I (very low risk - 0.4% complication rate)    The patient has the following additional risks for perioperative complications:  No identified additional risks      ICD-10-CM    1. Pre-op exam Z01.818      Immunizations UTD. No concerns noted.     RECOMMENDATIONS:                                                      Patient Instructions     Before Your Surgery      Call your surgeon if there is any change in your health. This includes signs of a cold or flu (such as a sore throat, runny nose, cough, rash or fever).    Do not smoke, drink alcohol or take over the counter medicine  (unless your surgeon or primary care doctor tells you to) for the 24 hours before and after surgery.    If you take prescribed drugs: Follow your doctor s orders about which medicines to take and which to stop until after surgery.    Eating and drinking prior to surgery: follow the instructions from your surgeon    Take a shower or bath the night before surgery. Use the soap your surgeon gave you to gently clean your skin. If you do not have soap from your surgeon, use your regular soap. Do not shave or scrub the surgery site.  Wear clean pajamas and have clean sheets on your bed.     -hold medications day of surgery         --Patient is to take all scheduled medications on the day of surgery EXCEPT for modifications listed below.    APPROVAL GIVEN to proceed with proposed procedure, without further diagnostic evaluation       Signed Electronically by: Ralph Perez MD, MD    Copy of this evaluation report is provided to requesting physician.    [place] Preop Guidelines

## 2017-11-28 NOTE — TELEPHONE ENCOUNTER
simvastatin (ZOCOR) 5 MG tablet   10 mg, AT BEDTIME 0 ordered  EditCancel Reorder     Summary: Take 2 tablets (10 mg) by mouth At Bedtime Due for labs for refills! You can walk into the labs anytime during business hours., Disp-60 tablet, R-0, E-Prescribe   Dose, Route, Frequency: 10 mg, Oral, AT BEDTIME  Start: 10/20/2017  Ord/Sold: 10/20/2017 (O)  Report  Taking:   Long-term:   Pharmacy: [facility] - [place], [place] - [address] AT Portal to Registered [facility]  Med Dose History       Patient Sig: Take 2 tablets (10 mg) by mouth At Bedtime Due for labs for refills! You can walk into the labs anytime during business hours.       Ordered on: 10/20/2017       Authorized by: BETO MCRAE       Dispense: 60 tablet       Admin Instructions: Due for labs for refills! You can walk into the labs anytime during business hours.       Prior Authorization: Request PA        LOV: 11/28/2017

## 2017-12-14 ENCOUNTER — OFFICE VISIT (OUTPATIENT)
Dept: FAMILY MEDICINE | Facility: CLINIC | Age: 82
End: 2017-12-14
Payer: COMMERCIAL

## 2017-12-14 VITALS
RESPIRATION RATE: 13 BRPM | BODY MASS INDEX: 20.68 KG/M2 | TEMPERATURE: 97.9 F | DIASTOLIC BLOOD PRESSURE: 72 MMHG | OXYGEN SATURATION: 94 % | SYSTOLIC BLOOD PRESSURE: 124 MMHG | HEART RATE: 115 BPM | WEIGHT: 139.6 LBS | HEIGHT: 69 IN

## 2017-12-14 DIAGNOSIS — J06.9 UPPER RESPIRATORY TRACT INFECTION, UNSPECIFIED TYPE: Primary | ICD-10-CM

## 2017-12-14 PROCEDURE — 99213 OFFICE O/P EST LOW 20 MIN: CPT | Performed by: FAMILY MEDICINE

## 2017-12-14 RX ORDER — BENZONATATE 200 MG/1
200 CAPSULE ORAL 3 TIMES DAILY PRN
Qty: 30 CAPSULE | Refills: 1 | Status: SHIPPED | OUTPATIENT
Start: 2017-12-14 | End: 2017-12-21

## 2017-12-14 NOTE — PATIENT INSTRUCTIONS
[facility]    If you have any questions regarding to your visit please contact your care team:       Team Red:   Clinic Hours Telephone Number   Dr. Shilpa Betancur, NP   7am-7pm  Monday - Thursday   7am-5pm  Fridays  [phone]  (Appointment scheduling available 24/7)    Questions about your visit?   Team Line  [phone]   Urgent Care - [place] and [place] - 11am-9pm Monday-Friday Saturday-Sunday- 9am-5pm   [place] - 5pm-9pm Monday-Friday Saturday-Sunday- 9am-5pm  [phone] - [place]   [phone] - [place]       What options do I have for visits at the clinic other than the traditional office visit?  To expand how we care for you, many of our providers are utilizing electronic visits (e-visits) and telephone visits, when medically appropriate, for interactions with their patients rather than a visit in the clinic.   We also offer nurse visits for many medical concerns. Just like any other service, we will bill your insurance company for this type of visit based on time spent on the phone with your provider. Not all insurance companies cover these visits. Please check with your medical insurance if this type of visit is covered. You will be responsible for any charges that are not paid by your insurance.      E-visits via [employer]:  generally incur a $35.00 fee.  Telephone visits:  Time spent on the phone: *charged based on time that is spent on the phone in increments of 10 minutes. Estimated cost:   5-10 mins $30.00   11-20 mins. $59.00   21-30 mins. $85.00     Use [employer] (secure email communication and access to your chart) to send your primary care provider a message or make an appointment. Ask someone on your Team how to sign up for [employer].  For a Price Quote for your services, please call our Consumer Price Line at [phone].      As always, Thank you for trusting us with your health care needs!    Carlos WILDE  [place]      Viral Upper Respiratory Illness (Adult)  You have a viral upper respiratory illness (URI), which is another term for the common cold. This illness is contagious during the first few days. It is spread through the air by coughing and sneezing. It may also be spread by direct contact (touching the sick person and then touching your own eyes, nose, or mouth). Frequent handwashing will decrease risk of spread. Most viral illnesses go away within 7 to 10 days with rest and simple home remedies. Sometimes the illness may last for several weeks. Antibiotics will not kill a virus, and they are generally not prescribed for this condition.    Home care    If symptoms are severe, rest at home for the first 2 to 3 days. When you resume activity, don't let yourself get too tired.    Avoid being exposed to cigarette smoke (yours or others ).    You may use acetaminophen or ibuprofen to control pain and fever, unless another medicine was prescribed. (Note: If you have chronic liver or kidney disease, have ever had a stomach ulcer or gastrointestinal bleeding, or are taking blood-thinning medicines, talk with your healthcare provider before using these medicines.) Aspirin should never be given to anyone under 18 years of age who is ill with a viral infection or fever. It may cause severe liver or brain damage.    Your appetite may be poor, so a light diet is fine. Avoid dehydration by drinking 6 to 8 glasses of fluids per day (water, soft drinks, juices, tea, or soup). Extra fluids will help loosen secretions in the nose and lungs.    Over-the-counter cold medicines will not shorten the length of time you re sick, but they may be helpful for the following symptoms: cough, sore throat, and nasal and sinus congestion. (Note: Do not use decongestants if you have high blood pressure.)  Follow-up care  Follow up with your healthcare provider, or as advised.  When to seek medical advice  Call your healthcare provider right  away if any of these occur:    Cough with lots of colored sputum (mucus)    Severe headache; face, neck, or ear pain    Difficulty swallowing due to throat pain    Fever of 100.4 F (38 C)  Call 911, or get immediate medical care  Call emergency services right away if any of these occur:    Chest pain, shortness of breath, wheezing, or difficulty breathing    Coughing up blood    Inability to swallow due to throat pain  Date Last Reviewed: 9/13/2015 2000-2017 [employer]. [address] [place] [address]. All rights reserved. This information is not intended as a substitute for professional medical care. Always follow your healthcare professional's instructions.

## 2017-12-14 NOTE — PROGRESS NOTES
"  SUBJECTIVE:   Carlos Faith is a 82 year old male who presents to clinic today for the following health issues:    RESPIRATORY SYMPTOMS    Duration: x 4 days     Description  cough    Severity: moderate    Accompanying signs and symptoms: rhinorrhea     History (predisposing factors):  none    Precipitating or alleviating factors: Vitamin C    Therapies tried and outcome:  none    I have reviewed the patient's medical history in detail and updated the computerized patient record.     ROS:  C: NEGATIVE for fever, chills, change in weight  I: NEGATIVE for worrisome rashes, moles or lesions  E: NEGATIVE for vision changes or irritation  ENT/MOUTH: rhinorrhea-clear  RESP:cough-non productive  CV: chest pain/pressure with coughing     OBJECTIVE:     /72 (BP Location: Left arm, Patient Position: Chair, Cuff Size: Adult Regular)  Pulse 115  Temp 97.9  F (36.6  C) (Oral)  Resp 13  Ht 5' 8.5\" (1.74 m)  Wt 139 lb 9.6 oz (63.3 kg)  SpO2 94%  BMI 20.92 kg/m2  Body mass index is 20.92 kg/(m^2).  GENERAL: healthy, alert and no distress  EYES: Eyes grossly normal to inspection, PERRL and conjunctivae and sclerae normal  HENT: ear canals and TM's normal, nose and mouth without ulcers or lesions  NECK: no adenopathy, no asymmetry, masses, or scars and thyroid normal to palpation  RESP: lungs clear to auscultation - no rales, rhonchi or wheezes  CV: regular rate and rhythm, normal S1 S2, no S3 or S4, no murmur, click or rub, no peripheral edema and peripheral pulses strong  MS: no gross musculoskeletal defects noted, no edema  PSYCH: mentation appears normal, affect normal/bright    Diagnostic Test Results:  Results for orders placed or performed in visit on 06/01/17   XR Surgery SELINA L/T 5 Min Fluoro    Narrative    This exam was marked as non-reportable because it will not be read by a   radiologist or a [place] non-radiologist provider.                 ASSESSMENT/PLAN:   (J06.9) Upper respiratory tract " infection, unspecified type  (primary encounter diagnosis)  Plan: benzonatate (TESSALON) 200 MG capsule        Symptomatic care.  Return to clinic for persistence, recurrence or new symptoms.       See Patient Instructions    Shilpa Denson MD  [facility]

## 2017-12-14 NOTE — MR AVS SNAPSHOT
After Visit Summary   12/14/2017    Carlos Faith    MRN: [id number]           Patient Information     Date Of Birth          1935        Visit Information        Provider Department      12/14/2017 2:00 PM Shilpa Denson MD [facility]        Today's Diagnoses     Upper respiratory tract infection, unspecified type    -  1      Care Instructions    [facility]    If you have any questions regarding to your visit please contact your care team:       Team Red:   Clinic Hours Telephone Number   Dr. Shilpa Betancur, NP   7am-7pm  Monday - Thursday   7am-5pm  Fridays  [phone]  (Appointment scheduling available 24/7)    Questions about your visit?   Team Line  [phone]   Urgent Care - [place] and [facility] - 11am-9pm Monday-Friday Saturday-Sunday- 9am-5pm   [place] - 5pm-9pm Monday-Friday Saturday-Sunday- 9am-5pm  [phone] - [place]   [phone] - [place]       What options do I have for visits at the clinic other than the traditional office visit?  To expand how we care for you, many of our providers are utilizing electronic visits (e-visits) and telephone visits, when medically appropriate, for interactions with their patients rather than a visit in the clinic.   We also offer nurse visits for many medical concerns. Just like any other service, we will bill your insurance company for this type of visit based on time spent on the phone with your provider. Not all insurance companies cover these visits. Please check with your medical insurance if this type of visit is covered. You will be responsible for any charges that are not paid by your insurance.      E-visits via FitVia:  generally incur a $35.00 fee.  Telephone visits:  Time spent on the phone: *charged based on time that is spent on the phone in increments of 10 minutes. Estimated cost:   5-10 mins $30.00   11-20 mins.  $59.00   21-30 mins. $85.00     Use [email or web address] (secure email communication and access to your chart) to send your primary care provider a message or make an appointment. Ask someone on your Team how to sign up for Bacula.  For a Price Quote for your services, please call our Consumer Price Line at [phone].      As always, Thank you for trusting us with your health care needs!    Carlos Jj      Viral Upper Respiratory Illness (Adult)  You have a viral upper respiratory illness (URI), which is another term for the common cold. This illness is contagious during the first few days. It is spread through the air by coughing and sneezing. It may also be spread by direct contact (touching the sick person and then touching your own eyes, nose, or mouth). Frequent handwashing will decrease risk of spread. Most viral illnesses go away within 7 to 10 days with rest and simple home remedies. Sometimes the illness may last for several weeks. Antibiotics will not kill a virus, and they are generally not prescribed for this condition.    Home care    If symptoms are severe, rest at home for the first 2 to 3 days. When you resume activity, don't let yourself get too tired.    Avoid being exposed to cigarette smoke (yours or others ).    You may use acetaminophen or ibuprofen to control pain and fever, unless another medicine was prescribed. (Note: If you have chronic liver or kidney disease, have ever had a stomach ulcer or gastrointestinal bleeding, or are taking blood-thinning medicines, talk with your healthcare provider before using these medicines.) Aspirin should never be given to anyone under 18 years of age who is ill with a viral infection or fever. It may cause severe liver or brain damage.    Your appetite may be poor, so a light diet is fine. Avoid dehydration by drinking 6 to 8 glasses of fluids per day (water, soft drinks, juices, tea, or soup). Extra fluids will help loosen secretions in the nose and  lungs.    Over-the-counter cold medicines will not shorten the length of time you re sick, but they may be helpful for the following symptoms: cough, sore throat, and nasal and sinus congestion. (Note: Do not use decongestants if you have high blood pressure.)  Follow-up care  Follow up with your healthcare provider, or as advised.  When to seek medical advice  Call your healthcare provider right away if any of these occur:    Cough with lots of colored sputum (mucus)    Severe headache; face, neck, or ear pain    Difficulty swallowing due to throat pain    Fever of 100.4 F (38 C)  Call 911, or get immediate medical care  Call emergency services right away if any of these occur:    Chest pain, shortness of breath, wheezing, or difficulty breathing    Coughing up blood    Inability to swallow due to throat pain  Date Last Reviewed: 9/13/2015 2000-2017 [employer]. [address] [place] [address]. All rights reserved. This information is not intended as a substitute for professional medical care. Always follow your healthcare professional's instructions.                Follow-ups after your visit        Follow-up notes from your care team     Return if symptoms worsen or fail to improve, for Wellness visit.      Who to contact     If you have questions or need follow up information about today's clinic visit or your schedule please contact [facility] directly at [phone].  Normal or non-critical lab and imaging results will be communicated to you by MyChart, letter or phone within 4 business days after the clinic has received the results. If you do not hear from us within 7 days, please contact the clinic through MyChart or phone. If you have a critical or abnormal lab result, we will notify you by phone as soon as possible.  Submit refill requests through [employer]x or call your pharmacy and they will forward the refill request to us. Please allow 3 business days for your  "refill to be completed.          Additional Information About Your Visit        [employer] Information     Rapid Pathogen Screening lets you send messages to your doctor, view your test results, renew your prescriptions, schedule appointments and more. To sign up, go to [email or web address] . Click on \"Log in\" on the left side of the screen, which will take you to the Welcome page. Then click on \"Sign up Now\" on the right side of the page.     You will be asked to enter the access code listed below, as well as some personal information. Please follow the directions to create your username and password.     Your access code is: [email or web address]  Expires: 2018  3:08 PM     Your access code will  in 90 days. If you need help or a new code, please call your [place] clinic or [phone].        Care EveryWhere ID     This is your Care EveryWhere ID. This could be used by other organizations to access your [place] medical records  [id number]        Your Vitals Were     Pulse Temperature Respirations Height Pulse Oximetry BMI (Body Mass Index)    115 97.9  F (36.6  C) (Oral) 13 5' 8.5\" (1.74 m) 94% 20.92 kg/m2       Blood Pressure from Last 3 Encounters:   17 124/72   17 132/76   17 126/74    Weight from Last 3 Encounters:   17 139 lb 9.6 oz (63.3 kg)   17 142 lb (64.4 kg)   17 144 lb (65.3 kg)              Today, you had the following     No orders found for display       Primary Care Provider Office Phone # Fax #    Antoinette Fairdeny Bagley [phone]       [address] [facility] [address]        Equal Access to Services     LAURA ROUSE : Galo Morin, siria carrillo, qaybta kaalelier sprague. So [facility] [phone].    ATENCIÓN: Si habla español, tiene a stout disposición servicios gratuitos de asistencia lingüística. Llame al [phone].    We comply with applicable federal civil rights laws and " [place] laws. We do not discriminate on the basis of race, color, national origin, age, disability, sex, sexual orientation, or gender identity.            Thank you!     Thank you for choosing [facility]  for your care. Our goal is always to provide you with excellent care. Hearing back from our patients is one way we can continue to improve our services. Please take a few minutes to complete the written survey that you may receive in the mail after your visit with us. Thank you!             Your Updated Medication List - Protect others around you: Learn how to safely use, store and throw away your medicines at www.disposemymeds.org.          This list is accurate as of: 12/14/17  2:29 PM.  Always use your most recent med list.                   Brand Name Dispense Instructions for use Diagnosis    amitriptyline 25 MG tablet    ELAVIL    90 tablet    Take 1 tablet (25 mg) by mouth At Bedtime    Adjustment disorder with anxious mood       glucosamine-chondroitin 500-400 MG Caps per capsule      Take 1 capsule by mouth daily        latanoprost 0.005 % ophthalmic solution    XALATAN     INSTILL 1 DROP INTO BOTH EYES EVERY EVENING AS DIRECTED        omeprazole 40 MG capsule    priLOSEC    90 capsule    TAKE 1 CAPSULE DAILY 30-60 MINUTES BEFORE A MEAL    Gastroesophageal reflux disease with esophagitis       order for DME     1 Box    Equipment being ordered: Straight Cath  - box, size and length per patient preference    Urinary tract infection, site unspecified       simvastatin 5 MG tablet    ZOCOR    60 tablet    Take 2 tablets (10 mg) by mouth At Bedtime Due for labs for refills! You can walk into the labs anytime during business hours.    Mixed hyperlipidemia       SLO-NIACIN PO           TURMERIC PO      Take by mouth daily

## 2017-12-14 NOTE — NURSING NOTE
"Chief Complaint   Patient presents with     URI       Initial /72 (BP Location: Left arm, Patient Position: Chair, Cuff Size: Adult Regular)  Pulse 115  Temp 97.9  F (36.6  C) (Oral)  Resp 13  Ht 5' 8.5\" (1.74 m)  Wt 139 lb 9.6 oz (63.3 kg)  SpO2 94%  BMI 20.92 kg/m2 Estimated body mass index is 20.92 kg/(m^2) as calculated from the following:    Height as of this encounter: 5' 8.5\" (1.74 m).    Weight as of this encounter: 139 lb 9.6 oz (63.3 kg).  Medication Reconciliation: complete     Carlos Jj      "

## 2017-12-19 RX ORDER — SIMVASTATIN 5 MG
10 TABLET ORAL AT BEDTIME
Qty: 60 TABLET | Refills: 0 | Status: SHIPPED | OUTPATIENT
Start: 2017-12-19 | End: 2018-01-04

## 2017-12-21 ENCOUNTER — OFFICE VISIT (OUTPATIENT)
Dept: FAMILY MEDICINE | Facility: CLINIC | Age: 82
End: 2017-12-21
Payer: COMMERCIAL

## 2017-12-21 VITALS
OXYGEN SATURATION: 95 % | WEIGHT: 140 LBS | SYSTOLIC BLOOD PRESSURE: 120 MMHG | HEART RATE: 123 BPM | TEMPERATURE: 98.1 F | BODY MASS INDEX: 20.98 KG/M2 | DIASTOLIC BLOOD PRESSURE: 70 MMHG

## 2017-12-21 DIAGNOSIS — F41.1 GAD (GENERALIZED ANXIETY DISORDER): Primary | ICD-10-CM

## 2017-12-21 PROCEDURE — 99213 OFFICE O/P EST LOW 20 MIN: CPT | Performed by: NURSE PRACTITIONER

## 2017-12-21 RX ORDER — BUSPIRONE HYDROCHLORIDE 5 MG/1
TABLET ORAL
Qty: 150 TABLET | Refills: 0 | Status: SHIPPED | OUTPATIENT
Start: 2017-12-21 | End: 2018-01-05

## 2017-12-21 ASSESSMENT — ANXIETY QUESTIONNAIRES
5. BEING SO RESTLESS THAT IT IS HARD TO SIT STILL: NOT AT ALL
1. FEELING NERVOUS, ANXIOUS, OR ON EDGE: NEARLY EVERY DAY
2. NOT BEING ABLE TO STOP OR CONTROL WORRYING: NEARLY EVERY DAY
7. FEELING AFRAID AS IF SOMETHING AWFUL MIGHT HAPPEN: MORE THAN HALF THE DAYS
3. WORRYING TOO MUCH ABOUT DIFFERENT THINGS: NEARLY EVERY DAY
GAD7 TOTAL SCORE: 15
IF YOU CHECKED OFF ANY PROBLEMS ON THIS QUESTIONNAIRE, HOW DIFFICULT HAVE THESE PROBLEMS MADE IT FOR YOU TO DO YOUR WORK, TAKE CARE OF THINGS AT HOME, OR GET ALONG WITH OTHER PEOPLE: SOMEWHAT DIFFICULT
6. BECOMING EASILY ANNOYED OR IRRITABLE: MORE THAN HALF THE DAYS

## 2017-12-21 ASSESSMENT — PAIN SCALES - GENERAL: PAINLEVEL: NO PAIN (0)

## 2017-12-21 ASSESSMENT — PATIENT HEALTH QUESTIONNAIRE - PHQ9
5. POOR APPETITE OR OVEREATING: MORE THAN HALF THE DAYS
SUM OF ALL RESPONSES TO PHQ QUESTIONS 1-9: 6

## 2017-12-21 NOTE — PROGRESS NOTES
SUBJECTIVE:   Carlos Faith is a 82 year old male who presents to clinic today for the following health issues:    Anxiety Follow-Up    Status since last visit: Worsened, has taken medication in the past but not sure what the medication was     Other associated symptoms:None    Complicating factors:   Significant life event: Yes- Wife with health issues     Current substance abuse: None  Depression symptoms: No  KANDI-7 SCORE 2/14/2017   Total Score 0       GAD7            Amount of exercise or physical activity: 5 days/week for an average of greater than 60 minutes    Problems taking medications regularly: No    Medication side effects: none    Diet: regular (no restrictions)    Patient notes worsening anxiety since age 75.  It has previously been managed well with amitriptyline.  Patient notes that 6 months ago he lost a great grandchild and his wife has developed more health concerns.  He notes worsening anxiety, poor sleep, difficulty controlling racing thoughts.  He denies thoughts of suicide or self harm.  He denies chest pain, palpitations, shortness of breath.      Problem list and histories reviewed & adjusted, as indicated.  Additional history: as documented    Patient Active Problem List   Diagnosis     H/O prostate cancer     History of penile implant     Mixed hyperlipidemia     Adjustment disorder with anxious mood     Gastroesophageal reflux disease without esophagitis     Past Surgical History:   Procedure Laterality Date      penile implant  1994     CATARACT IOL, RT/LT       CYSTOSCOPY, DILATE URETHRA, COMBINED N/A 6/1/2017    Procedure: COMBINED CYSTOSCOPY, DILATE URETHRA;  Cysto, urethral ballon dilation and Holmium laser Lithotripsy;  Surgeon: Juanito Vaughan MD;  Location: [facility] OR     HERNIA REPAIR, INGUINAL RT/LT       PROSTATE SURGERY  1994    Prostatectomy       Social History   Substance Use Topics     Smoking status: Never Smoker     Smokeless tobacco: Never Used     Alcohol use  0.0 oz/week     0 Standard drinks or equivalent per week      Comment: Wine     Family History   Problem Relation Age of Onset     Coronary Artery Disease Father      83 MI     Prostate Cancer Paternal Grandfather          Current Outpatient Prescriptions   Medication Sig Dispense Refill     busPIRone (BUSPAR) 5 MG tablet Start at 5 mg twice daily for 3 days, then 7.5 mg (1.5 tabs) twice daily for 3 days, then 10 mg (2 tabs) twice daily for 3 days, then 12.5 mg (2.5 tabs) twice daily for 3 days, then 15 mg (3 tabs) twice daily and stay at that dose 150 tablet 0     simvastatin (ZOCOR) 5 MG tablet Take 2 tablets (10 mg) by mouth At Bedtime Due for labs for refills! You can walk into the labs anytime during business hours. 60 tablet 0     TURMERIC PO Take by mouth daily       latanoprost (XALATAN) 0.005 % ophthalmic solution INSTILL 1 DROP INTO BOTH EYES EVERY EVENING AS DIRECTED  1     omeprazole (PRILOSEC) 40 MG capsule TAKE 1 CAPSULE DAILY 30-60 MINUTES BEFORE A MEAL 90 capsule 1     amitriptyline (ELAVIL) 25 MG tablet Take 1 tablet (25 mg) by mouth At Bedtime 90 tablet 2     glucosamine-chondroitin 500-400 MG CAPS Take 1 capsule by mouth daily       SLO-NIACIN PO        order for DME Equipment being ordered: Straight Cath  - box, size and length per patient preference 1 Box 1     Allergies   Allergen Reactions     Sulfa Drugs Rash     BP Readings from Last 3 Encounters:   12/21/17 120/70   12/14/17 124/72   11/28/17 132/76    Wt Readings from Last 3 Encounters:   12/21/17 140 lb (63.5 kg)   12/14/17 139 lb 9.6 oz (63.3 kg)   11/28/17 142 lb (64.4 kg)                  Labs reviewed in EPIC        Reviewed and updated as needed this visit by clinical staffTobacco  Allergies  Meds       Reviewed and updated as needed this visit by Provider         ROS:  Constitutional, HEENT, cardiovascular, pulmonary, gi and gu systems are negative, except as otherwise noted.      OBJECTIVE:   /70  Pulse 123  Temp 98.1  F  (36.7  C) (Oral)  Wt 140 lb (63.5 kg)  SpO2 95%  BMI 20.98 kg/m2  Body mass index is 20.98 kg/(m^2).  GENERAL: healthy, alert and no distress  RESP: lungs clear to auscultation - no rales, rhonchi or wheezes  CV: regular rate and rhythm, normal S1 S2, no S3 or S4, no murmur, click or rub, no peripheral edema and peripheral pulses strong  MS: no gross musculoskeletal defects noted, no edema  PSYCH: mentation appears normal, anxious, judgement and insight intact and appearance well groomed    Diagnostic Test Results:  none     ASSESSMENT/PLAN:     1. KANDI (generalized anxiety disorder)  Patient to start buspar and continue amitriptyline.  He declines counseling at this time.  Follow-up in 2 weeks.  - busPIRone (BUSPAR) 5 MG tablet; Start at 5 mg twice daily for 3 days, then 7.5 mg (1.5 tabs) twice daily for 3 days, then 10 mg (2 tabs) twice daily for 3 days, then 12.5 mg (2.5 tabs) twice daily for 3 days, then 15 mg (3 tabs) twice daily and stay at that dose  Dispense: 150 tablet; Refill: 0    FUTURE APPOINTMENTS:       - Follow-up visit in 2 weeks.    FLORENTINO [facility]

## 2017-12-21 NOTE — NURSING NOTE
"Chief Complaint   Patient presents with     Anxiety       Initial /70  Pulse 123  Temp 98.1  F (36.7  C) (Oral)  Wt 140 lb (63.5 kg)  SpO2 95%  BMI 20.98 kg/m2 Estimated body mass index is 20.98 kg/(m^2) as calculated from the following:    Height as of 12/14/17: 5' 8.5\" (1.74 m).    Weight as of this encounter: 140 lb (63.5 kg).  Medication Reconciliation: complete    "

## 2017-12-21 NOTE — MR AVS SNAPSHOT
After Visit Summary   12/21/2017    Carlos Faith    MRN: [id number]           Patient Information     Date Of Birth          1935        Visit Information        Provider Department      12/21/2017 9:20 AM Laurita Gold APRN [facility]        Today's Diagnoses     KANDI (generalized anxiety disorder)    -  1      Care Instructions    [facility]    If you have any questions regarding to your visit please contact your care team:     Team Pink:   Clinic Hours Telephone Number   Internal Medicine:  Dr. Juana Gold, NP       7am-7pm  Monday - Thursday   7am-5pm  Fridays  [phone]  (Appointment scheduling available 24/7)    Questions about your visit?  Team Line  [phone]   Urgent Care - [place] and [facility] - 11am-9pm Monday-Friday Saturday-Sunday- 9am-5pm   [place] - 5pm-9pm Monday-Friday Saturday-Sunday- 9am-5pm  [phone] - [place]   [phone] - [place]       What options do I have for visits at the clinic other than the traditional office visit?  To expand how we care for you, many of our providers are utilizing electronic visits (e-visits) and telephone visits, when medically appropriate, for interactions with their patients rather than a visit in the clinic.   We also offer nurse visits for many medical concerns. Just like any other service, we will bill your insurance company for this type of visit based on time spent on the phone with your provider. Not all insurance companies cover these visits. Please check with your medical insurance if this type of visit is covered. You will be responsible for any charges that are not paid by your insurance.      E-visits via [employer]:  generally incur a $35.00 fee.  Telephone visits:  Time spent on the phone: *charged based on time that is spent on the phone in increments of 10 minutes. Estimated cost:   5-10 mins $30.00   11-20  "mins. $59.00   21-30 mins. $85.00   Use Vendahart (secure email communication and access to your chart) to send your primary care provider a message or make an appointment. Ask someone on your Team how to sign up for Vendahart.    For a Price Quote for your services, please call our [employer] Price Line at [phone].    As always, Thank you for trusting us with your health care needs!    Devi Sheridan MA            Follow-ups after your visit        Who to contact     If you have questions or need follow up information about today's clinic visit or your schedule please contact [facility] directly at [phone].  Normal or non-critical lab and imaging results will be communicated to you by MyChart, letter or phone within 4 business days after the clinic has received the results. If you do not hear from us within 7 days, please contact the clinic through Vendahart or phone. If you have a critical or abnormal lab result, we will notify you by phone as soon as possible.  Submit refill requests through Stampsy or call your pharmacy and they will forward the refill request to us. Please allow 3 business days for your refill to be completed.          Additional Information About Your Visit        Kiv[employer] Information     Stampsy lets you send messages to your doctor, view your test results, renew your prescriptions, schedule appointments and more. To sign up, go to [email or web address] . Click on \"Log in\" on the left side of the screen, which will take you to the Welcome page. Then click on \"Sign up Now\" on the right side of the page.     You will be asked to enter the access code listed below, as well as some personal information. Please follow the directions to create your username and password.     Your access code is: [email or web address]  Expires: 2018  3:08 PM     Your access code will  in 90 days. If you need help or a new code, please call your [facility] or [phone].        Care " EveryWhere ID     This is your Care EveryWhere ID. This could be used by other organizations to access your [place] medical records  [id number]        Your Vitals Were     Pulse Temperature Pulse Oximetry BMI (Body Mass Index)          123 98.1  F (36.7  C) (Oral) 95% 20.98 kg/m2         Blood Pressure from Last 3 Encounters:   12/21/17 120/70   12/14/17 124/72   11/28/17 132/76    Weight from Last 3 Encounters:   12/21/17 140 lb (63.5 kg)   12/14/17 139 lb 9.6 oz (63.3 kg)   11/28/17 142 lb (64.4 kg)              Today, you had the following     No orders found for display         Today's Medication Changes          These changes are accurate as of: 12/21/17  9:54 AM.  If you have any questions, ask your nurse or doctor.               Start taking these medicines.        Dose/Directions    busPIRone 5 MG tablet   Commonly known as:  BUSPAR   Used for:  KANDI (generalized anxiety disorder)   Started by:  Laurita Gold APRN CNP        Start at 5 mg twice daily for 3 days, then 7.5 mg (1.5 tabs) twice daily for 3 days, then 10 mg (2 tabs) twice daily for 3 days, then 12.5 mg (2.5 tabs) twice daily for 3 days, then 15 mg (3 tabs) twice daily and stay at that dose   Quantity:  150 tablet   Refills:  0            Where to get your medicines      Some of these will need a paper prescription and others can be bought over the counter.  Ask your nurse if you have questions.     Bring a paper prescription for each of these medications     busPIRone 5 MG tablet                Primary Care Provider Office Phone # Fax #    Antoinette Tayla Bagley [phone]       [facility] [address]        Equal Access to Services     LAURA ROUSE AH: Galo Morin, siria carrillo, elier herrera. So [facility] [phone].    ATENCIÓN: Si habla español, tiene a stout disposición servicios gratuitos de asistencia lingüística. Llame  al [phone].    We comply with applicable federal civil rights laws and [place] laws. We do not discriminate on the basis of race, color, national origin, age, disability, sex, sexual orientation, or gender identity.            Thank you!     Thank you for choosing [facility]  for your care. Our goal is always to provide you with excellent care. Hearing back from our patients is one way we can continue to improve our services. Please take a few minutes to complete the written survey that you may receive in the mail after your visit with us. Thank you!             Your Updated Medication List - Protect others around you: Learn how to safely use, store and throw away your medicines at www.disposemymeds.org.          This list is accurate as of: 12/21/17  9:54 AM.  Always use your most recent med list.                   Brand Name Dispense Instructions for use Diagnosis    amitriptyline 25 MG tablet    ELAVIL    90 tablet    Take 1 tablet (25 mg) by mouth At Bedtime    Adjustment disorder with anxious mood       busPIRone 5 MG tablet    BUSPAR    150 tablet    Start at 5 mg twice daily for 3 days, then 7.5 mg (1.5 tabs) twice daily for 3 days, then 10 mg (2 tabs) twice daily for 3 days, then 12.5 mg (2.5 tabs) twice daily for 3 days, then 15 mg (3 tabs) twice daily and stay at that dose    KANDI (generalized anxiety disorder)       glucosamine-chondroitin 500-400 MG Caps per capsule      Take 1 capsule by mouth daily        latanoprost 0.005 % ophthalmic solution    XALATAN     INSTILL 1 DROP INTO BOTH EYES EVERY EVENING AS DIRECTED        omeprazole 40 MG capsule    priLOSEC    90 capsule    TAKE 1 CAPSULE DAILY 30-60 MINUTES BEFORE A MEAL    Gastroesophageal reflux disease with esophagitis       order for DME     1 Box    Equipment being ordered: Straight Cath  - box, size and length per patient preference    Urinary tract infection, site unspecified       simvastatin 5 MG tablet    ZOCOR    60  tablet    Take 2 tablets (10 mg) by mouth At Bedtime Due for labs for refills! You can walk into the labs anytime during business hours.    Mixed hyperlipidemia       SLO-NIACIN PO           TURMERIC PO      Take by mouth daily

## 2017-12-21 NOTE — PATIENT INSTRUCTIONS
[facility] (formerly [facility])    If you have any questions regarding to your visit please contact your care team:     Team Pink:   Clinic Hours Telephone Number   Internal Medicine:  Dr. Juana Gold NP       7am-7pm  Monday - Thursday   7am-5pm  Fridays  [phone]  (Appointment scheduling available 24/7)    Questions about your visit?  Team Line  [phone]   Urgent Care - [place] and [facility] - 11am-9pm Monday-Friday Saturday-Sunday- 9am-5pm   [place] - 5pm-9pm Monday-Friday Saturday-Sunday- 9am-5pm  [phone] - [place]   [phone] - [place]       What options do I have for visits at the clinic other than the traditional office visit?  To expand how we care for you, many of our providers are utilizing electronic visits (e-visits) and telephone visits, when medically appropriate, for interactions with their patients rather than a visit in the clinic.   We also offer nurse visits for many medical concerns. Just like any other service, we will bill your insurance company for this type of visit based on time spent on the phone with your provider. Not all insurance companies cover these visits. Please check with your medical insurance if this type of visit is covered. You will be responsible for any charges that are not paid by your insurance.      E-visits via ThoughtBox:  generally incur a $35.00 fee.  Telephone visits:  Time spent on the phone: *charged based on time that is spent on the phone in increments of 10 minutes. Estimated cost:   5-10 mins $30.00   11-20 mins. $59.00   21-30 mins. $85.00   Use Faradayt (secure email communication and access to your chart) to send your primary care provider a message or make an appointment. Ask someone on your Team how to sign up for ThoughtBox.    For a Price Quote for your services, please call our Consumer Price Line at [phone].    As always, Thank you for trusting us with your health care  needs!    Devi Sheridan MA

## 2017-12-22 ASSESSMENT — ANXIETY QUESTIONNAIRES: GAD7 TOTAL SCORE: 15

## 2018-01-03 NOTE — PROGRESS NOTES
SUBJECTIVE:   Carlos Faith is a 82 year old male who presents to clinic today for the following health issues:      Medication Followup of Anxiety    Taking Medication as prescribed: NO    Side Effects:  Dry Mouth    Medication Helping Symptoms:  yes     Patient has noted some improvement in anxiety with buspar.  He notes a dry mouth as a side effect.  He continues to feel on edge and has difficulty sleeping.  He did take his wife's ambien with improvement in sleep.  He currently denies thoughts of suicide or self harm, but notes he did have some previously.  He feels on edge and can't relax.  He notes occasional racing thoughts through his mind.  Patient was seen in Urgent Care for anxiety/nausea and was advised to follow-up with primary care.  He was given zofran and his nausea has resolved.    Problem list and histories reviewed & adjusted, as indicated.  Additional history: as documented    Patient Active Problem List   Diagnosis     H/O prostate cancer     History of penile implant     Mixed hyperlipidemia     Adjustment disorder with anxious mood     Gastroesophageal reflux disease without esophagitis     Moderate single current episode of major depressive disorder (H)     Past Surgical History:   Procedure Laterality Date      penile implant  1994     CATARACT IOL, RT/LT       CYSTOSCOPY, DILATE URETHRA, COMBINED N/A 6/1/2017    Procedure: COMBINED CYSTOSCOPY, DILATE URETHRA;  Cysto, urethral ballon dilation and Holmium laser Lithotripsy;  Surgeon: Juanito Vaughan MD;  Location: [facility] OR     HERNIA REPAIR, INGUINAL RT/LT       PROSTATE SURGERY  1994    Prostatectomy       Social History   Substance Use Topics     Smoking status: Never Smoker     Smokeless tobacco: Never Used     Alcohol use 0.0 oz/week     0 Standard drinks or equivalent per week      Comment: Wine     Family History   Problem Relation Age of Onset     Coronary Artery Disease Father      83 MI     Prostate Cancer Paternal  Grandfather          Current Outpatient Prescriptions   Medication Sig Dispense Refill     simvastatin (ZOCOR) 5 MG tablet Take 2 tablets (10 mg) by mouth At Bedtime Due for labs for refills! You can walk into the labs anytime during business hours. 60 tablet 0     mirtazapine (REMERON) 15 MG tablet Take 1 tablet (15 mg) by mouth At Bedtime 30 tablet 1     busPIRone (BUSPAR) 15 MG tablet Take 1 tablet (15 mg) by mouth 2 times daily 60 tablet 1     busPIRone (BUSPAR) 5 MG tablet Start at 5 mg twice daily for 3 days, then 7.5 mg (1.5 tabs) twice daily for 3 days, then 10 mg (2 tabs) twice daily for 3 days, then 12.5 mg (2.5 tabs) twice daily for 3 days, then 15 mg (3 tabs) twice daily and stay at that dose 150 tablet 0     TURMERIC PO Take by mouth daily       latanoprost (XALATAN) 0.005 % ophthalmic solution INSTILL 1 DROP INTO BOTH EYES EVERY EVENING AS DIRECTED  1     omeprazole (PRILOSEC) 40 MG capsule TAKE 1 CAPSULE DAILY 30-60 MINUTES BEFORE A MEAL 90 capsule 1     amitriptyline (ELAVIL) 25 MG tablet Take 1 tablet (25 mg) by mouth At Bedtime 90 tablet 2     glucosamine-chondroitin 500-400 MG CAPS Take 1 capsule by mouth daily       SLO-NIACIN PO        [DISCONTINUED] simvastatin (ZOCOR) 5 MG tablet Take 2 tablets (10 mg) by mouth At Bedtime Due for labs for refills! You can walk into the labs anytime during business hours. 60 tablet 0     order for DME Equipment being ordered: Straight Cath  - box, size and length per patient preference (Patient not taking: Reported on 1/4/2018) 1 Box 1     Allergies   Allergen Reactions     Sulfa Drugs Rash     BP Readings from Last 3 Encounters:   01/04/18 110/70   12/21/17 120/70   12/14/17 124/72    Wt Readings from Last 3 Encounters:   01/04/18 138 lb (62.6 kg)   12/21/17 140 lb (63.5 kg)   12/14/17 139 lb 9.6 oz (63.3 kg)                  Labs reviewed in EPIC        Reviewed and updated as needed this visit by clinical staff       Reviewed and updated as needed this visit  by Provider         ROS:  Constitutional, HEENT, cardiovascular, pulmonary, gi and gu systems are negative, except as otherwise noted.      OBJECTIVE:   /70  Pulse 110  Temp 98.3  F (36.8  C) (Oral)  Resp 20  Wt 138 lb (62.6 kg)  SpO2 98%  BMI 20.68 kg/m2  Body mass index is 20.68 kg/(m^2).  GENERAL: healthy, alert and no distress  RESP: lungs clear to auscultation - no rales, rhonchi or wheezes  CV: regular rate and rhythm, normal S1 S2, no S3 or S4, no murmur, click or rub, no peripheral edema and peripheral pulses strong  MS: no gross musculoskeletal defects noted, no edema  PSYCH: mentation appears normal, anxious, judgement and insight intact and appearance well groomed    Diagnostic Test Results:  none     ASSESSMENT/PLAN:     1. KANDI (generalized anxiety disorder)  Patient to start remeron at bedtime for sleep and mood.  He was advised to avoid ambien due to age.  Patient declines counseling at this time.  He is able to contract for safety and will contact clinic or seek emergency care for thoughts of self harm.  He denies any suicidal ideation currently.  - mirtazapine (REMERON) 15 MG tablet; Take 1 tablet (15 mg) by mouth At Bedtime  Dispense: 30 tablet; Refill: 1  - busPIRone (BUSPAR) 15 MG tablet; Take 1 tablet (15 mg) by mouth 2 times daily  Dispense: 60 tablet; Refill: 1    2. Moderate single current episode of major depressive disorder (H)  As above.     3. Insomnia, unspecified type    - mirtazapine (REMERON) 15 MG tablet; Take 1 tablet (15 mg) by mouth At Bedtime  Dispense: 30 tablet; Refill: 1    4. Mixed hyperlipidemia  Patient to come fasting to follow-up appointment for repeat fasting lipid panel.  - simvastatin (ZOCOR) 5 MG tablet; Take 2 tablets (10 mg) by mouth At Bedtime Due for labs for refills! You can walk into the labs anytime during business hours.  Dispense: 60 tablet; Refill: 0    FUTURE APPOINTMENTS:       - Follow-up visit in 2 weeks.    FLORENTINO Rutherford  CNP  [facility]

## 2018-01-04 ENCOUNTER — TELEPHONE (OUTPATIENT)
Dept: FAMILY MEDICINE | Facility: CLINIC | Age: 83
End: 2018-01-04

## 2018-01-04 ENCOUNTER — OFFICE VISIT (OUTPATIENT)
Dept: FAMILY MEDICINE | Facility: CLINIC | Age: 83
End: 2018-01-04
Payer: COMMERCIAL

## 2018-01-04 VITALS
DIASTOLIC BLOOD PRESSURE: 70 MMHG | SYSTOLIC BLOOD PRESSURE: 110 MMHG | BODY MASS INDEX: 20.68 KG/M2 | HEART RATE: 110 BPM | OXYGEN SATURATION: 98 % | WEIGHT: 138 LBS | RESPIRATION RATE: 20 BRPM | TEMPERATURE: 98.3 F

## 2018-01-04 DIAGNOSIS — F41.1 GAD (GENERALIZED ANXIETY DISORDER): ICD-10-CM

## 2018-01-04 DIAGNOSIS — G47.00 INSOMNIA, UNSPECIFIED TYPE: ICD-10-CM

## 2018-01-04 DIAGNOSIS — F41.1 GAD (GENERALIZED ANXIETY DISORDER): Primary | ICD-10-CM

## 2018-01-04 DIAGNOSIS — E78.2 MIXED HYPERLIPIDEMIA: ICD-10-CM

## 2018-01-04 DIAGNOSIS — F32.1 MODERATE SINGLE CURRENT EPISODE OF MAJOR DEPRESSIVE DISORDER (H): ICD-10-CM

## 2018-01-04 PROCEDURE — 99213 OFFICE O/P EST LOW 20 MIN: CPT | Performed by: NURSE PRACTITIONER

## 2018-01-04 RX ORDER — SIMVASTATIN 5 MG
10 TABLET ORAL AT BEDTIME
Qty: 60 TABLET | Refills: 0 | Status: SHIPPED | OUTPATIENT
Start: 2018-01-04 | End: 2018-01-05

## 2018-01-04 RX ORDER — MIRTAZAPINE 15 MG/1
15 TABLET, FILM COATED ORAL AT BEDTIME
Qty: 30 TABLET | Refills: 1 | Status: SHIPPED | OUTPATIENT
Start: 2018-01-04 | End: 2018-01-05

## 2018-01-04 RX ORDER — BUSPIRONE HYDROCHLORIDE 5 MG/1
TABLET ORAL
Qty: 150 TABLET | Refills: 0 | Status: CANCELLED | OUTPATIENT
Start: 2018-01-04

## 2018-01-04 RX ORDER — BUSPIRONE HYDROCHLORIDE 15 MG/1
15 TABLET ORAL 2 TIMES DAILY
Qty: 60 TABLET | Refills: 1 | Status: SHIPPED | OUTPATIENT
Start: 2018-01-04 | End: 2018-01-05

## 2018-01-04 ASSESSMENT — PATIENT HEALTH QUESTIONNAIRE - PHQ9
5. POOR APPETITE OR OVEREATING: MORE THAN HALF THE DAYS
SUM OF ALL RESPONSES TO PHQ QUESTIONS 1-9: 10

## 2018-01-04 ASSESSMENT — ANXIETY QUESTIONNAIRES
5. BEING SO RESTLESS THAT IT IS HARD TO SIT STILL: MORE THAN HALF THE DAYS
6. BECOMING EASILY ANNOYED OR IRRITABLE: SEVERAL DAYS
IF YOU CHECKED OFF ANY PROBLEMS ON THIS QUESTIONNAIRE, HOW DIFFICULT HAVE THESE PROBLEMS MADE IT FOR YOU TO DO YOUR WORK, TAKE CARE OF THINGS AT HOME, OR GET ALONG WITH OTHER PEOPLE: NOT DIFFICULT AT ALL
3. WORRYING TOO MUCH ABOUT DIFFERENT THINGS: MORE THAN HALF THE DAYS
GAD7 TOTAL SCORE: 10
1. FEELING NERVOUS, ANXIOUS, OR ON EDGE: SEVERAL DAYS
7. FEELING AFRAID AS IF SOMETHING AWFUL MIGHT HAPPEN: NOT AT ALL
2. NOT BEING ABLE TO STOP OR CONTROL WORRYING: MORE THAN HALF THE DAYS

## 2018-01-04 ASSESSMENT — PAIN SCALES - GENERAL: PAINLEVEL: NO PAIN (0)

## 2018-01-04 NOTE — PATIENT INSTRUCTIONS
Continue buspirone at 15 mg twice daily.  Your new prescription will be for one 15 mg pill twice daily.  Start remeron 15 mg at bedtime daily.  Return for follow-up in 2 weeks.  Come fasting to your appointment and we can get your lipid panel drawn.    [facility]    If you have any questions regarding to your visit please contact your care team:     Team Pink:   Clinic Hours Telephone Number   Internal Medicine:  Dr. Juana Gold NP       7am-7pm  Monday - Thursday   7am-5pm  Fridays  [phone]  (Appointment scheduling available 24/7)    Questions about your visit?  Team Line  [phone]   Urgent Care - [place] and [place] - 11am-9pm Monday-Friday Saturday-Sunday- 9am-5pm   [place] - 5pm-9pm Monday-Friday Saturday-Sunday- 9am-5pm  [phone] - [place]   [phone] - [place]       What options do I have for visits at the clinic other than the traditional office visit?  To expand how we care for you, many of our providers are utilizing electronic visits (e-visits) and telephone visits, when medically appropriate, for interactions with their patients rather than a visit in the clinic.   We also offer nurse visits for many medical concerns. Just like any other service, we will bill your insurance company for this type of visit based on time spent on the phone with your provider. Not all insurance companies cover these visits. Please check with your medical insurance if this type of visit is covered. You will be responsible for any charges that are not paid by your insurance.      E-visits via Goodwall:  generally incur a $35.00 fee.  Telephone visits:  Time spent on the phone: *charged based on time that is spent on the phone in increments of 10 minutes. Estimated cost:   5-10 mins $30.00   11-20 mins. $59.00   21-30 mins. $85.00   Use Goodwall (secure email communication and access to your chart) to send your primary care provider a  message or make an appointment. Ask someone on your Team how to sign up for [place].    For a Price Quote for your services, please call our Consumer Price Line at [phone].    As always, Thank you for trusting us with your health care needs!    Discharged By:  LUIS A LEONARD

## 2018-01-04 NOTE — MR AVS SNAPSHOT
After Visit Summary   1/4/2018    Carlos Faith    MRN: [id number]           Patient Information     Date Of Birth          1935        Visit Information        Provider Department      1/4/2018 10:40 AM Laurita Gold APRN [facility]        Today's Diagnoses     KANDI (generalized anxiety disorder)    -  1    Moderate single current episode of major depressive disorder (H)        Insomnia, unspecified type        Mixed hyperlipidemia          Care Instructions    Continue buspirone at 15 mg twice daily.  Your new prescription will be for one 15 mg pill twice daily.  Start remeron 15 mg at bedtime daily.  Return for follow-up in 2 weeks.  Come fasting to your appointment and we can get your lipid panel drawn.    [facility]    If you have any questions regarding to your visit please contact your care team:     Team Pink:   Clinic Hours Telephone Number   Internal Medicine:  Dr. Juana Gold, NP       7am-7pm  Monday - Thursday   7am-5pm  Fridays  [phone]  (Appointment scheduling available 24/7)    Questions about your visit?  Team Line  [phone]   Urgent Care - [place] and [place]da - 11am-9pm Monday-Friday Saturday-Sunday- 9am-5pm   [place] - 5pm-9pm Monday-Friday Saturday-Sunday- 9am-5pm  [phone] - [place]   [phone] - [place]       What options do I have for visits at the clinic other than the traditional office visit?  To expand how we care for you, many of our providers are utilizing electronic visits (e-visits) and telephone visits, when medically appropriate, for interactions with their patients rather than a visit in the clinic.   We also offer nurse visits for many medical concerns. Just like any other service, we will bill your insurance company for this type of visit based on time spent on the phone with your provider. Not all insurance companies cover these  "visits. Please check with your medical insurance if this type of visit is covered. You will be responsible for any charges that are not paid by your insurance.      E-visits via Synergost:  generally incur a $35.00 fee.  Telephone visits:  Time spent on the phone: *charged based on time that is spent on the phone in increments of 10 minutes. Estimated cost:   5-10 mins $30.00   11-20 mins. $59.00   21-30 mins. $85.00   Use Synergost (secure email communication and access to your chart) to send your primary care provider a message or make an appointment. Ask someone on your Team how to sign up for trakkies Research.    For a Price Quote for your services, please call our [place] Line at [phone].    As always, Thank you for trusting us with your health care needs!    Discharged By:  LUIS A LEONARD            Follow-ups after your visit        Who to contact     If you have questions or need follow up information about today's clinic visit or your schedule please contact [facility] directly at [phone].  Normal or non-critical lab and imaging results will be communicated to you by AutoRealtyhart, letter or phone within 4 business days after the clinic has received the results. If you do not hear from us within 7 days, please contact the clinic through AutoRealtyhart or phone. If you have a critical or abnormal lab result, we will notify you by phone as soon as possible.  Submit refill requests through trakkies Research or call your pharmacy and they will forward the refill request to us. Please allow 3 business days for your refill to be completed.          Additional Information About Your Visit        trakkies Research Information     trakkies Research lets you send messages to your doctor, view your test results, renew your prescriptions, schedule appointments and more. To sign up, go to [email or web address] . Click on \"Log in\" on the left side of the screen, which will take you to the Welcome page. Then click on \"Sign up Now\" on the right side of " the page.     You will be asked to enter the access code listed below, as well as some personal information. Please follow the directions to create your username and password.     Your access code is: [email or web address]  Expires: 2018  3:08 PM     Your access code will  in 90 days. If you need help or a new code, please call your [place] clinic or [phone].        Care EveryWhere ID     This is your Care EveryWhere ID. This could be used by other organizations to access your [place] medical records  [id number]        Your Vitals Were     Pulse Temperature Respirations Pulse Oximetry BMI (Body Mass Index)       110 98.3  F (36.8  C) (Oral) 20 98% 20.68 kg/m2        Blood Pressure from Last 3 Encounters:   18 110/70   17 120/70   17 124/72    Weight from Last 3 Encounters:   18 138 lb (62.6 kg)   17 140 lb (63.5 kg)   17 139 lb 9.6 oz (63.3 kg)              Today, you had the following     No orders found for display         Today's Medication Changes          These changes are accurate as of: 18 11:11 AM.  If you have any questions, ask your nurse or doctor.               Start taking these medicines.        Dose/Directions    mirtazapine 15 MG tablet   Commonly known as:  REMERON   Used for:  KANDI (generalized anxiety disorder), Insomnia, unspecified type   Started by:  Laurita Gold APRN CNP        Dose:  15 mg   Take 1 tablet (15 mg) by mouth At Bedtime   Quantity:  30 tablet   Refills:  1         These medicines have changed or have updated prescriptions.        Dose/Directions    * busPIRone 5 MG tablet   Commonly known as:  BUSPAR   This may have changed:  Another medication with the same name was added. Make sure you understand how and when to take each.   Used for:  KANDI (generalized anxiety disorder)   Changed by:  Laurita Gold APRN CNP        Start at 5 mg twice daily for 3 days, then 7.5 mg (1.5 tabs) twice daily for 3 days, then 10  mg (2 tabs) twice daily for 3 days, then 12.5 mg (2.5 tabs) twice daily for 3 days, then 15 mg (3 tabs) twice daily and stay at that dose   Quantity:  150 tablet   Refills:  0       * busPIRone 15 MG tablet   Commonly known as:  BUSPAR   This may have changed:  You were already taking a medication with the same name, and this prescription was added. Make sure you understand how and when to take each.   Used for:  KANDI (generalized anxiety disorder)   Changed by:  Laurita Gold APRN CNP        Dose:  15 mg   Take 1 tablet (15 mg) by mouth 2 times daily   Quantity:  60 tablet   Refills:  1       * Notice:  This list has 2 medication(s) that are the same as other medications prescribed for you. Read the directions carefully, and ask your doctor or other care provider to review them with you.         Where to get your medicines      These medications were sent to [facility] [address] AT [address] to [address], [facility] [address]     Phone:  [phone]     busPIRone 15 MG tablet    mirtazapine 15 MG tablet    simvastatin 5 MG tablet                Primary Care Provider Office Phone # Fax #    Antoinette Bagley [phone]       [address] [place] [address]        Equal Access to Services     LAURA ROUSE AH: Hadii modesta chatterjee hadasho Sochristian, waaxda luqadaha, qaybta kaalmada jose a, elier henning. So [facility] [phone].    ATENCIÓN: Si habla español, tiene a stout disposición servicios gratuitos de asistencia lingüística. Fariba al [phone].    We comply with applicable federal civil rights laws and [place] laws. We do not discriminate on the basis of race, color, national origin, age, disability, sex, sexual orientation, or gender identity.            Thank you!     Thank you for choosing [facility]  for your care. Our goal is always to provide you  with excellent care. Hearing back from our patients is one way we can continue to improve our services. Please take a few minutes to complete the written survey that you may receive in the mail after your visit with us. Thank you!             Your Updated Medication List - Protect others around you: Learn how to safely use, store and throw away your medicines at www.disposemymeds.org.          This list is accurate as of: 1/4/18 11:11 AM.  Always use your most recent med list.                   Brand Name Dispense Instructions for use Diagnosis    amitriptyline 25 MG tablet    ELAVIL    90 tablet    Take 1 tablet (25 mg) by mouth At Bedtime    Adjustment disorder with anxious mood       * busPIRone 5 MG tablet    BUSPAR    150 tablet    Start at 5 mg twice daily for 3 days, then 7.5 mg (1.5 tabs) twice daily for 3 days, then 10 mg (2 tabs) twice daily for 3 days, then 12.5 mg (2.5 tabs) twice daily for 3 days, then 15 mg (3 tabs) twice daily and stay at that dose    KANDI (generalized anxiety disorder)       * busPIRone 15 MG tablet    BUSPAR    60 tablet    Take 1 tablet (15 mg) by mouth 2 times daily    KANDI (generalized anxiety disorder)       glucosamine-chondroitin 500-400 MG Caps per capsule      Take 1 capsule by mouth daily        latanoprost 0.005 % ophthalmic solution    XALATAN     INSTILL 1 DROP INTO BOTH EYES EVERY EVENING AS DIRECTED        mirtazapine 15 MG tablet    REMERON    30 tablet    Take 1 tablet (15 mg) by mouth At Bedtime    KANDI (generalized anxiety disorder), Insomnia, unspecified type       omeprazole 40 MG capsule    priLOSEC    90 capsule    TAKE 1 CAPSULE DAILY 30-60 MINUTES BEFORE A MEAL    Gastroesophageal reflux disease with esophagitis       order for DME     1 Box    Equipment being ordered: Straight Cath  - box, size and length per patient preference    Urinary tract infection, site unspecified       simvastatin 5 MG tablet    ZOCOR    60 tablet    Take 2 tablets (10 mg) by mouth At  Bedtime Due for labs for refills! You can walk into the labs anytime during business hours.    Mixed hyperlipidemia       SLO-NIACIN PO           TURMERIC PO      Take by mouth daily        * Notice:  This list has 2 medication(s) that are the same as other medications prescribed for you. Read the directions carefully, and ask your doctor or other care provider to review them with you.

## 2018-01-04 NOTE — TELEPHONE ENCOUNTER
Reason for call:  Medication   If this is a refill request, has the caller requested the refill from the pharmacy already? Yes  Will the patient be using a [facility]? No  Name of the pharmacy and phone number for the current request: [facility]/pharmacy #[id number] - [place], [place] [id number] [facility] [address] [facility]    Name of the medication requested: Medications prescribed at visit on 01/04/2018; please send to pharmacy above.    simvastatin (ZOCOR) 5 MG tablet  mirtazapine (REMERON) 15 MG tablet  busPIRone (BUSPAR) 15 MG tablet    Other request: Call patient with any questions.      Phone number to reach patient:  Home number on file [phone] (home)    Best Time:  ASAP    Can we leave a detailed message on this number?  YES

## 2018-01-05 RX ORDER — BUSPIRONE HYDROCHLORIDE 5 MG/1
TABLET ORAL
Qty: 150 TABLET | Refills: 0 | Status: CANCELLED | OUTPATIENT
Start: 2018-01-05

## 2018-01-05 RX ORDER — BUSPIRONE HYDROCHLORIDE 15 MG/1
15 TABLET ORAL 2 TIMES DAILY
Qty: 60 TABLET | Refills: 1 | Status: SHIPPED | OUTPATIENT
Start: 2018-01-05 | End: 2018-01-24

## 2018-01-05 RX ORDER — SIMVASTATIN 5 MG
10 TABLET ORAL AT BEDTIME
Qty: 60 TABLET | Refills: 0 | Status: SHIPPED | OUTPATIENT
Start: 2018-01-05 | End: 2018-01-24

## 2018-01-05 RX ORDER — MIRTAZAPINE 15 MG/1
15 TABLET, FILM COATED ORAL AT BEDTIME
Qty: 30 TABLET | Refills: 1 | Status: SHIPPED | OUTPATIENT
Start: 2018-01-05 | End: 2018-01-24

## 2018-01-05 ASSESSMENT — ANXIETY QUESTIONNAIRES: GAD7 TOTAL SCORE: 10

## 2018-01-05 NOTE — TELEPHONE ENCOUNTER
Sent.  Updated patient via VM that prescriptions requested have been resent to [facility]    Jania Santamaria RN

## 2018-01-13 DIAGNOSIS — E78.2 MIXED HYPERLIPIDEMIA: ICD-10-CM

## 2018-01-15 NOTE — TELEPHONE ENCOUNTER
"Requested Prescriptions   Pending Prescriptions Disp Refills     simvastatin (ZOCOR) 5 MG tablet [Pharmacy Med Name: SIMVASTATIN  TAB 5MG]  Last Written Prescription Date:  1/5/2018  Last Fill Quantity: 60 tablet,  # refills: 0   Last Office Visit with [facility], P or [facility] prescribing provider:  11/28/2017  Future Office Visit:      180 tablet 3     Sig: TAKE 2 TABLETS (10MG) AT   BEDTIME *DUE FOR OFFICE    VISIT FOR FURTHER REFILLS*    Statins Protocol Failed    1/13/2018 10:07 AM       Failed - LDL on file in past 12 months    Recent Labs   Lab Test  10/04/16   1445   LDL  130*            Passed - No abnormal creatine kinase in past 12 months    No lab results found.         Passed - Recent or future visit with authorizing provider    Patient had office visit in the last year or has a visit in the next 30 days with authorizing provider.  See \"Patient Info\" tab in inbasket, or \"Choose Columns\" in Meds & Orders section of the refill encounter.              Passed - Patient is age 18 or older          "

## 2018-01-16 RX ORDER — SIMVASTATIN 5 MG
20 TABLET ORAL AT BEDTIME
Qty: 60 TABLET | Refills: 0 | Status: SHIPPED | OUTPATIENT
Start: 2018-01-16 | End: 2018-01-24

## 2018-01-23 NOTE — PROGRESS NOTES
SUBJECTIVE:   Carlos Faith is a 82 year old male who presents to clinic today for the following health issues:      Medication Followup of mirtazapine (REMERON) 15 MG tablet, busPIRone (BUSPAR) 15 MG tablet    Taking Medication as prescribed: yes    Side Effects:  None    Medication Helping Symptoms:  yes     Patient notes his anxiety is greatly improved.  He is sleeping better, but not as well as he would like.  He is sleeping about 4 hours a night.  He notes his panic attacks have decreased.  He denies thoughts of suicide.  Patient notes he is eating okay and his dry mouth has resolved.    Hyperlipidemia Follow-Up      Rate your low fat/cholesterol diet?: good    Taking statin?  Yes, no muscle aches from statin    Other lipid medications/supplements?:  none      Problem list and histories reviewed & adjusted, as indicated.  Additional history: as documented    Patient Active Problem List   Diagnosis     H/O prostate cancer     History of penile implant     Mixed hyperlipidemia     Adjustment disorder with anxious mood     Gastroesophageal reflux disease without esophagitis     Moderate single current episode of major depressive disorder (H)     KANDI (generalized anxiety disorder)     Past Surgical History:   Procedure Laterality Date      penile implant  1994     CATARACT IOL, RT/LT       CYSTOSCOPY, DILATE URETHRA, COMBINED N/A 6/1/2017    Procedure: COMBINED CYSTOSCOPY, DILATE URETHRA;  Cysto, urethral ballon dilation and Holmium laser Lithotripsy;  Surgeon: Juanito Vaughan MD;  Location: [facility]     HERNIA REPAIR, INGUINAL RT/LT       PROSTATE SURGERY  1994    Prostatectomy       Social History   Substance Use Topics     Smoking status: Never Smoker     Smokeless tobacco: Never Used     Alcohol use 0.0 oz/week     0 Standard drinks or equivalent per week      Comment: Wine     Family History   Problem Relation Age of Onset     Coronary Artery Disease Father      83 MI     Prostate Cancer Paternal  Grandfather          Current Outpatient Prescriptions   Medication Sig Dispense Refill     simvastatin (ZOCOR) 5 MG tablet Take 2 tablets (10 mg) by mouth At Bedtime 180 tablet 3     mirtazapine (REMERON) 30 MG tablet Take 1 tablet (30 mg) by mouth At Bedtime 90 tablet 1     busPIRone (BUSPAR) 15 MG tablet Take 1 tablet (15 mg) by mouth 2 times daily 180 tablet 1     TURMERIC PO Take by mouth daily       omeprazole (PRILOSEC) 40 MG capsule TAKE 1 CAPSULE DAILY 30-60 MINUTES BEFORE A MEAL 90 capsule 1     amitriptyline (ELAVIL) 25 MG tablet Take 1 tablet (25 mg) by mouth At Bedtime 90 tablet 2     glucosamine-chondroitin 500-400 MG CAPS Take 1 capsule by mouth daily       SLO-NIACIN PO        [DISCONTINUED] simvastatin (ZOCOR) 5 MG tablet Take 4 tablets (20 mg) by mouth At Bedtime Due for labs for refills! 60 tablet 0     [DISCONTINUED] simvastatin (ZOCOR) 5 MG tablet Take 2 tablets (10 mg) by mouth At Bedtime Due for labs for refills 60 tablet 0     [DISCONTINUED] mirtazapine (REMERON) 15 MG tablet Take 1 tablet (15 mg) by mouth At Bedtime 30 tablet 1     latanoprost (XALATAN) 0.005 % ophthalmic solution INSTILL 1 DROP INTO BOTH EYES EVERY EVENING AS DIRECTED  1     order for DME Equipment being ordered: Straight Cath  - box, size and length per patient preference (Patient not taking: Reported on 1/4/2018) 1 Box 1     Allergies   Allergen Reactions     Sulfa Drugs Rash     BP Readings from Last 3 Encounters:   01/24/18 120/74   01/04/18 110/70   12/21/17 120/70    Wt Readings from Last 3 Encounters:   01/24/18 138 lb 9.6 oz (62.9 kg)   01/04/18 138 lb (62.6 kg)   12/21/17 140 lb (63.5 kg)                  Labs reviewed in EPIC    Reviewed and updated as needed this visit by clinical staff     Reviewed and updated as needed this visit by Provider         ROS:  Constitutional, HEENT, cardiovascular, pulmonary, gi and gu systems are negative, except as otherwise noted.    OBJECTIVE:     /74  Pulse 110  Temp  96.5  F (35.8  C) (Oral)  Wt 138 lb 9.6 oz (62.9 kg)  SpO2 97%  BMI 20.77 kg/m2  Body mass index is 20.77 kg/(m^2).  GENERAL: healthy, alert and no distress  RESP: lungs clear to auscultation - no rales, rhonchi or wheezes  CV: regular rate and rhythm, normal S1 S2, no S3 or S4, no murmur, click or rub, no peripheral edema and peripheral pulses strong  MS: no gross musculoskeletal defects noted, no edema  PSYCH: mentation appears normal, affect normal/bright    Diagnostic Test Results:  pending    ASSESSMENT/PLAN:     1. Moderate single current episode of major depressive disorder (H)  Improved with remeron.      2. KANDI (generalized anxiety disorder)  Will increase remeron to 30 mg at bedtime to help with sleep.  - mirtazapine (REMERON) 30 MG tablet; Take 1 tablet (30 mg) by mouth At Bedtime  Dispense: 90 tablet; Refill: 1  - busPIRone (BUSPAR) 15 MG tablet; Take 1 tablet (15 mg) by mouth 2 times daily  Dispense: 180 tablet; Refill: 1    3. Mixed hyperlipidemia  Stable.  Continue current treatment plan and medications.    - simvastatin (ZOCOR) 5 MG tablet; Take 2 tablets (10 mg) by mouth At Bedtime  Dispense: 180 tablet; Refill: 3  - Lipid panel reflex to direct LDL Fasting    4. H/O prostate cancer    - PSA, tumor marker    FUTURE APPOINTMENTS:       - Follow-up visit in 6 months.    FLORENTINO [facility]

## 2018-01-24 ENCOUNTER — OFFICE VISIT (OUTPATIENT)
Dept: FAMILY MEDICINE | Facility: CLINIC | Age: 83
End: 2018-01-24
Payer: COMMERCIAL

## 2018-01-24 VITALS
WEIGHT: 138.6 LBS | OXYGEN SATURATION: 97 % | TEMPERATURE: 96.5 F | BODY MASS INDEX: 20.77 KG/M2 | HEART RATE: 110 BPM | DIASTOLIC BLOOD PRESSURE: 74 MMHG | SYSTOLIC BLOOD PRESSURE: 120 MMHG

## 2018-01-24 DIAGNOSIS — F32.1 MODERATE SINGLE CURRENT EPISODE OF MAJOR DEPRESSIVE DISORDER (H): Primary | ICD-10-CM

## 2018-01-24 DIAGNOSIS — E78.2 MIXED HYPERLIPIDEMIA: ICD-10-CM

## 2018-01-24 DIAGNOSIS — Z85.46 H/O PROSTATE CANCER: ICD-10-CM

## 2018-01-24 DIAGNOSIS — F41.1 GAD (GENERALIZED ANXIETY DISORDER): ICD-10-CM

## 2018-01-24 LAB
CHOLEST SERPL-MCNC: 189 MG/DL
HDLC SERPL-MCNC: 73 MG/DL
LDLC SERPL CALC-MCNC: 102 MG/DL
NONHDLC SERPL-MCNC: 116 MG/DL
PSA SERPL-MCNC: <0.01 UG/L (ref 0–4)
TRIGL SERPL-MCNC: 69 MG/DL

## 2018-01-24 PROCEDURE — 80061 LIPID PANEL: CPT | Performed by: NURSE PRACTITIONER

## 2018-01-24 PROCEDURE — 84153 ASSAY OF PSA TOTAL: CPT | Performed by: NURSE PRACTITIONER

## 2018-01-24 PROCEDURE — 99214 OFFICE O/P EST MOD 30 MIN: CPT | Performed by: NURSE PRACTITIONER

## 2018-01-24 PROCEDURE — 36415 COLL VENOUS BLD VENIPUNCTURE: CPT | Performed by: NURSE PRACTITIONER

## 2018-01-24 RX ORDER — BUSPIRONE HYDROCHLORIDE 15 MG/1
15 TABLET ORAL 2 TIMES DAILY
Qty: 180 TABLET | Refills: 1 | Status: SHIPPED | OUTPATIENT
Start: 2018-01-24 | End: 2018-09-11

## 2018-01-24 RX ORDER — SIMVASTATIN 5 MG
10 TABLET ORAL AT BEDTIME
Qty: 180 TABLET | Refills: 3 | Status: SHIPPED | OUTPATIENT
Start: 2018-01-24 | End: 2019-02-26

## 2018-01-24 RX ORDER — MIRTAZAPINE 30 MG/1
30 TABLET, FILM COATED ORAL AT BEDTIME
Qty: 90 TABLET | Refills: 1 | Status: SHIPPED | OUTPATIENT
Start: 2018-01-24 | End: 2018-08-16

## 2018-01-24 NOTE — MR AVS SNAPSHOT
After Visit Summary   1/24/2018    Carlos Faith    MRN: [id number]           Patient Information     Date Of Birth          1935        Visit Information        Provider Department      1/24/2018 9:40 AM Laurita Gold APRN [facility]        Today's Diagnoses     Moderate single current episode of major depressive disorder (H)    -  1    KANDI (generalized anxiety disorder)        Mixed hyperlipidemia        H/O prostate cancer          Care Instructions    Follow-up in 6 months.      [facility]    If you have any questions regarding to your visit please contact your care team:     Team Pink:   Clinic Hours Telephone Number   Internal Medicine:  Dr. Juana Gold, NP       7am-7pm  Monday - Thursday   7am-5pm  Fridays  [phone]  (Appointment scheduling available 24/7)    Questions about your visit?  Team Line  [phone]   [facility] [place] [facility] [facility] 11am-9pm Monday-Friday Saturday-Sunday- 9am-5pm   [place] - 5pm-9pm Monday-Friday Saturday-Sunday- 9am-5pm  [phone] - [place]   [phone] - [place]       What options do I have for visits at the clinic other than the traditional office visit?  To expand how we care for you, many of our providers are utilizing electronic visits (e-visits) and telephone visits, when medically appropriate, for interactions with their patients rather than a visit in the clinic.   We also offer nurse visits for many medical concerns. Just like any other service, we will bill your insurance company for this type of visit based on time spent on the phone with your provider. Not all insurance companies cover these visits. Please check with your medical insurance if this type of visit is covered. You will be responsible for any charges that are not paid by your insurance.      E-visits via [employer]:  generally incur a $35.00 fee.  Telephone  "visits:  Time spent on the phone: *charged based on time that is spent on the phone in increments of 10 minutes. Estimated cost:   5-10 mins $30.00   11-20 mins. $59.00   21-30 mins. $85.00   Use GeckoLifehart (secure email communication and access to your chart) to send your primary care provider a message or make an appointment. Ask someone on your Team how to sign up for SidelineSwapt.    For a Price Quote for your services, please call our [employer] Line at [phone].    As always, Thank you for trusting us with your health care needs!    Discharged by Kailyn CROCKER CMA ([facility])            Follow-ups after your visit        Who to contact     If you have questions or need follow up information about today's clinic visit or your schedule please contact [facility] directly at [phone].  Normal or non-critical lab and imaging results will be communicated to you by MyChart, letter or phone within 4 business days after the clinic has received the results. If you do not hear from us within 7 days, please contact the clinic through [employer] or phone. If you have a critical or abnormal lab result, we will notify you by phone as soon as possible.  Submit refill requests through [employer] or call your pharmacy and they will forward the refill request to us. Please allow 3 business days for your refill to be completed.          Additional Information About Your Visit        GeckoLifehart Information     SidelineSwapt lets you send messages to your doctor, view your test results, renew your prescriptions, schedule appointments and more. To sign up, go to [email or web address] . Click on \"Log in\" on the left side of the screen, which will take you to the Welcome page. Then click on \"Sign up Now\" on the right side of the page.     You will be asked to enter the access code listed below, as well as some personal information. Please follow the directions to create your username and password.     Your access code is: " [email or web address]  Expires: 2018  3:08 PM     Your access code will  in 90 days. If you need help or a new code, please call your [facility] clinic or [phone].        Care EveryWhere ID     This is your Care EveryWhere ID. This could be used by other organizations to access your [facility] medical records  [id number]        Your Vitals Were     Pulse Temperature Pulse Oximetry BMI (Body Mass Index)          110 96.5  F (35.8  C) (Oral) 97% 20.77 kg/m2         Blood Pressure from Last 3 Encounters:   18 120/74   18 110/70   17 120/70    Weight from Last 3 Encounters:   18 138 lb 9.6 oz (62.9 kg)   18 138 lb (62.6 kg)   17 140 lb (63.5 kg)              We Performed the Following     Lipid panel reflex to direct LDL Fasting     PSA, tumor marker          Today's Medication Changes          These changes are accurate as of 18 10:23 AM.  If you have any questions, ask your nurse or doctor.               These medicines have changed or have updated prescriptions.        Dose/Directions    mirtazapine 30 MG tablet   Commonly known as:  REMERON   This may have changed:    - medication strength  - how much to take   Used for:  KANDI (generalized anxiety disorder)   Changed by:  Laurita Gold APRN CNP        Dose:  30 mg   Take 1 tablet (30 mg) by mouth At Bedtime   Quantity:  90 tablet   Refills:  1       simvastatin 5 MG tablet   Commonly known as:  ZOCOR   This may have changed:  additional instructions   Used for:  Mixed hyperlipidemia   Changed by:  Laurita Gold APRN CNP        Dose:  10 mg   Take 2 tablets (10 mg) by mouth At Bedtime   Quantity:  180 tablet   Refills:  3            Where to get your medicines      These medications were sent to [facility]/pharmacy #[id number] - [place], [place] - [facility] [place] [address], [place] [address]     Phone:  [phone]     busPIRone 15 MG tablet    mirtazapine 30 MG  tablet    simvastatin 5 MG tablet                Primary Care Provider Office Phone # Fax #    Antoinette Bagley [phone]       [address] [place] [address]        Equal Access to Services     LAURA ROUSE : Hadii aad ku hadronnello Soomaali, waaxda luqadaha, qaybta kaalmada adeegyada, waxjayme mayrin hayaan noel isabel grisel henning. So [facility] [phone].    ATENCIÓN: Si habla español, tiene a stout disposición servicios gratuitos de asistencia lingüística. Llame al [phone].    We comply with applicable federal civil rights laws and [place] laws. We do not discriminate on the basis of race, color, national origin, age, disability, sex, sexual orientation, or gender identity.            Thank you!     Thank you for choosing [facility]  for your care. Our goal is always to provide you with excellent care. Hearing back from our patients is one way we can continue to improve our services. Please take a few minutes to complete the written survey that you may receive in the mail after your visit with us. Thank you!             Your Updated Medication List - Protect others around you: Learn how to safely use, store and throw away your medicines at www.disposemymeds.org.          This list is accurate as of 1/24/18 10:23 AM.  Always use your most recent med list.                   Brand Name Dispense Instructions for use Diagnosis    amitriptyline 25 MG tablet    ELAVIL    90 tablet    Take 1 tablet (25 mg) by mouth At Bedtime    Adjustment disorder with anxious mood       busPIRone 15 MG tablet    BUSPAR    180 tablet    Take 1 tablet (15 mg) by mouth 2 times daily    KANDI (generalized anxiety disorder)       glucosamine-chondroitin 500-400 MG Caps per capsule      Take 1 capsule by mouth daily        latanoprost 0.005 % ophthalmic solution    XALATAN     INSTILL 1 DROP INTO BOTH EYES EVERY EVENING AS DIRECTED        mirtazapine 30 MG tablet    REMERON    90 tablet    Take 1  tablet (30 mg) by mouth At Bedtime    KANDI (generalized anxiety disorder)       omeprazole 40 MG capsule    priLOSEC    90 capsule    TAKE 1 CAPSULE DAILY 30-60 MINUTES BEFORE A MEAL    Gastroesophageal reflux disease with esophagitis       order for DME     1 Box    Equipment being ordered: Straight Cath  - box, size and length per patient preference    Urinary tract infection, site unspecified       simvastatin 5 MG tablet    ZOCOR    180 tablet    Take 2 tablets (10 mg) by mouth At Bedtime    Mixed hyperlipidemia       SLO-NIACIN PO           TURMERIC PO      Take by mouth daily

## 2018-01-24 NOTE — PATIENT INSTRUCTIONS
Follow-up in 6 months.      [facility]    If you have any questions regarding to your visit please contact your care team:     Team Pink:   Clinic Hours Telephone Number   Internal Medicine:  Dr. Juana Gold, NP       7am-7pm  Monday - Thursday   7am-5pm  Fridays  [phone]  (Appointment scheduling available 24/7)    Questions about your visit?  Team Line  [phone]   Urgent Care - [place] and [facility] - 11am-9pm Monday-Friday Saturday-Sunday- 9am-5pm   [place] - 5pm-9pm Monday-Friday Saturday-Sunday- 9am-5pm  [phone] - [facility]  [phone] - [place]       What options do I have for visits at the clinic other than the traditional office visit?  To expand how we care for you, many of our providers are utilizing electronic visits (e-visits) and telephone visits, when medically appropriate, for interactions with their patients rather than a visit in the clinic.   We also offer nurse visits for many medical concerns. Just like any other service, we will bill your insurance company for this type of visit based on time spent on the phone with your provider. Not all insurance companies cover these visits. Please check with your medical insurance if this type of visit is covered. You will be responsible for any charges that are not paid by your insurance.      E-visits via [employer]:  generally incur a $35.00 fee.  Telephone visits:  Time spent on the phone: *charged based on time that is spent on the phone in increments of 10 minutes. Estimated cost:   5-10 mins $30.00   11-20 mins. $59.00   21-30 mins. $85.00   Use [employer]ig[employer]art (secure email communication and access to your chart) to send your primary care provider a message or make an appointment. Ask someone on your Team how to sign up for [employer].    For a Price Quote for your services, please call our Consumer Price Line at [phone].    As always, Thank you for trusting us with  your health care needs!    Discharged by Kailyn CROCKER CMA ([facility])

## 2018-01-24 NOTE — LETTER
[facility]  [address] [place] [address]    January 25, 2018    Carlos Faith  [address]   [facility] [address]          Dear Flip Gonzalez cholesterol.   Normal PSA tumor marker.  Enclosed is a copy of your results.     Results for orders placed or performed in visit on 01/24/18   Lipid panel reflex to direct LDL Fasting   Result Value Ref Range    Cholesterol 189 <200 mg/dL    Triglycerides 69 <150 mg/dL    HDL Cholesterol 73 >39 mg/dL    LDL Cholesterol Calculated 102 (H) <100 mg/dL    Non HDL Cholesterol 116 <130 mg/dL   PSA, tumor marker   Result Value Ref Range    PSA <0.01 0 - 4 ug/L       If you have any questions or concerns, please call myself or my nurse at [phone].      Sincerely,        Laurita Gold CNP/pb

## 2018-01-24 NOTE — LETTER
My Depression Action Plan  Name: Carlos Faith   Date of Birth 1935  Date: 1/24/2018    My doctor: Antoinette Bagley   My clinic: [address]  [place] [address]  [phone]          GREEN    ZONE   Good Control    What it looks like:     Things are going generally well. You have normal up s and down s. You may even feel depressed from time to time, but bad moods usually last less than a day.   What you need to do:  1. Continue to care for yourself (see self care plan)  2. Check your depression survival kit and update it as needed  3. Follow your physician s recommendations including any medication.  4. Do not stop taking medication unless you consult with your physician first.           YELLOW         ZONE Getting Worse    What it looks like:     Depression is starting to interfere with your life.     It may be hard to get out of bed; you may be starting to isolate yourself from others.    Symptoms of depression are starting to last most all day and this has happened for several days.     You may have suicidal thoughts but they are not constant.   What you need to do:     1. Call your care team, your response to treatment will improve if you keep your care team informed of your progress. Yellow periods are signs an adjustment may need to be made.     2. Continue your self-care, even if you have to fake it!    3. Talk to someone in your support network    4. Open up your depression survival kit           RED    ZONE Medical Alert - Get Help    What it looks like:     Depression is seriously interfering with your life.     You may experience these or other symptoms: You can t get out of bed most days, can t work or engage in other necessary activities, you have trouble taking care of basic hygiene, or basic responsibilities, thoughts of suicide or death that will not go away, self-injurious behavior.     What you need to do:  1. Call your care  team and request a same-day appointment. If they are not available (weekends or after hours) call your local crisis line, emergency room or 911.      Electronically signed by: Laurita Gold, January 24, 2018    Depression Self Care Plan / Survival Kit    Self-Care for Depression  Here s the deal. Your body and mind are really not as separate as most people think.  What you do and think affects how you feel and how you feel influences what you do and think. This means if you do things that people who feel good do, it will help you feel better.  Sometimes this is all it takes.  There is also a place for medication and therapy depending on how severe your depression is, so be sure to consult with your medical provider and/ or Behavioral Health Consultant if your symptoms are worsening or not improving.     In order to better manage my stress, I will:    Exercise  Get some form of exercise, every day. This will help reduce pain and release endorphins, the  feel good  chemicals in your brain. This is almost as good as taking antidepressants!  This is not the same as joining a gym and then never going! (they count on that by the way ) It can be as simple as just going for a walk or doing some gardening, anything that will get you moving.      Hygiene   Maintain good hygiene (Get out of bed in the morning, Make your bed, Brush your teeth, Take a shower, and Get dressed like you were going to work, even if you are unemployed).  If your clothes don't fit try to get ones that do.    Diet  I will strive to eat foods that are good for me, drink plenty of water, and avoid excessive sugar, caffeine, alcohol, and other mood-altering substances.  Some foods that are helpful in depression are: complex carbohydrates, B vitamins, flaxseed, fish or fish oil, fresh fruits and vegetables.    Psychotherapy  I agree to participate in Individual Therapy (if recommended).    Medication  If prescribed medications, I agree to take them.   Missing doses can result in serious side effects.  I understand that drinking alcohol, or other illicit drug use, may cause potential side effects.  I will not stop my medication abruptly without first discussing it with my provider.    Staying Connected With Others  I will stay in touch with my friends, family members, and my primary care provider/team.    Use your imagination  Be creative.  We all have a creative side; it doesn t matter if it s oil painting, sand castles, or mud pies! This will also kick up the endorphins.    Witness Beauty  (AKA stop and smell the roses) Take a look outside, even in mid-winter. Notice colors, textures. Watch the squirrels and birds.     Service to others  Be of service to others.  There is always someone else in need.  By helping others we can  get out of ourselves  and remember the really important things.  This also provides opportunities for practicing all the other parts of the program.    Humor  Laugh and be silly!  Adjust your TV habits for less news and crime-drama and more comedy.    Control your stress  Try breathing deep, massage therapy, biofeedback, and meditation. Find time to relax each day.     My support system    Clinic Contact:  Phone number:    Contact 1:  Phone number:    Contact 2:  Phone number:    Rastafarian/:  Phone number:    Therapist:  Phone number:    Local crisis center:    Phone number:    Other community support:  Phone number:

## 2018-01-25 NOTE — PROGRESS NOTES
Dear Carlos,    Your recent test results are attached.      Good cholesterol.  Normal PSA tumor marker.    If you have any questions please feel free to contact [phone] or myself via [employer].    Sincerely,  Laurita Gold, CNP

## 2018-02-12 DIAGNOSIS — F43.22 ADJUSTMENT DISORDER WITH ANXIOUS MOOD: ICD-10-CM

## 2018-02-12 NOTE — TELEPHONE ENCOUNTER
Reason for Call:  Medication or medication refill:    Do you use a [facility]?  Name of the pharmacy and phone number for the current request: [facility]     Name of the medication requested: amitriptyline (ELAVIL) 25 MG tablet    Other request: Patient is going to start getting his scripts at [facility] in [place]. He states that [facility] is all screwed up and the money he saves is not worth the hassle.    Can we leave a detailed message on this number? YES    Phone number patient can be reached at: Home number on file [phone] (home)    Best Time: any    Call taken on 2/12/2018 at 12:49 PM by Silvina Og

## 2018-02-12 NOTE — TELEPHONE ENCOUNTER
"Requested Prescriptions   Pending Prescriptions Disp Refills     amitriptyline (ELAVIL) 25 MG tablet  Last Written Prescription Date:  8/27/2017  Last Fill Quantity: 90 TABLET,  # refills: 2   Last Office Visit with [facility], P or [facility] prescribing provider:  1/24/2018  Future Office Visit:      90 tablet 2     Sig: Take 1 tablet (25 mg) by mouth At Bedtime    Tricyclic Antidepressants Protocol Passed    2/12/2018 12:51 PM       Passed - Blood pressure under 140/90    BP Readings from Last 3 Encounters:   01/24/18 120/74   01/04/18 110/70   12/21/17 120/70          Passed - Recent (12 mo) or future (30 d) visit with authorizing provider's specialty     Patient had office visit in the last year or has a visit in the next 30 days with authorizing provider.  See \"Patient Info\" tab in inbasket, or \"Choose Columns\" in Meds & Orders section of the refill encounter.        Passed - Patient is age 18 or older          "

## 2018-02-13 NOTE — TELEPHONE ENCOUNTER
Prescription approved per [facility] Refill Protocol.  Halie Snyder RN

## 2018-03-28 ENCOUNTER — RADIANT APPOINTMENT (OUTPATIENT)
Dept: GENERAL RADIOLOGY | Facility: CLINIC | Age: 83
End: 2018-03-28
Attending: FAMILY MEDICINE
Payer: COMMERCIAL

## 2018-03-28 ENCOUNTER — OFFICE VISIT (OUTPATIENT)
Dept: FAMILY MEDICINE | Facility: CLINIC | Age: 83
End: 2018-03-28
Payer: COMMERCIAL

## 2018-03-28 VITALS
TEMPERATURE: 98.8 F | OXYGEN SATURATION: 96 % | BODY MASS INDEX: 21.58 KG/M2 | DIASTOLIC BLOOD PRESSURE: 68 MMHG | SYSTOLIC BLOOD PRESSURE: 130 MMHG | HEART RATE: 77 BPM | WEIGHT: 144 LBS

## 2018-03-28 DIAGNOSIS — K21.00 GASTROESOPHAGEAL REFLUX DISEASE WITH ESOPHAGITIS: ICD-10-CM

## 2018-03-28 DIAGNOSIS — R05.9 COUGH: ICD-10-CM

## 2018-03-28 DIAGNOSIS — F41.1 GAD (GENERALIZED ANXIETY DISORDER): ICD-10-CM

## 2018-03-28 DIAGNOSIS — F32.1 MODERATE SINGLE CURRENT EPISODE OF MAJOR DEPRESSIVE DISORDER (H): ICD-10-CM

## 2018-03-28 DIAGNOSIS — F43.22 ADJUSTMENT DISORDER WITH ANXIOUS MOOD: Primary | ICD-10-CM

## 2018-03-28 DIAGNOSIS — Z13.1 SCREENING FOR DIABETES MELLITUS: ICD-10-CM

## 2018-03-28 LAB
BASOPHILS # BLD AUTO: 0 10E9/L (ref 0–0.2)
BASOPHILS NFR BLD AUTO: 0.2 %
DIFFERENTIAL METHOD BLD: NORMAL
EOSINOPHIL # BLD AUTO: 0 10E9/L (ref 0–0.7)
EOSINOPHIL NFR BLD AUTO: 0.4 %
ERYTHROCYTE [DISTWIDTH] IN BLOOD BY AUTOMATED COUNT: 14.6 % (ref 10–15)
HCT VFR BLD AUTO: 45.6 % (ref 40–53)
HGB BLD-MCNC: 15.5 G/DL (ref 13.3–17.7)
LYMPHOCYTES # BLD AUTO: 0.8 10E9/L (ref 0.8–5.3)
LYMPHOCYTES NFR BLD AUTO: 17.5 %
MCH RBC QN AUTO: 31.2 PG (ref 26.5–33)
MCHC RBC AUTO-ENTMCNC: 34 G/DL (ref 31.5–36.5)
MCV RBC AUTO: 92 FL (ref 78–100)
MONOCYTES # BLD AUTO: 0.7 10E9/L (ref 0–1.3)
MONOCYTES NFR BLD AUTO: 14.4 %
NEUTROPHILS # BLD AUTO: 3.1 10E9/L (ref 1.6–8.3)
NEUTROPHILS NFR BLD AUTO: 67.5 %
PLATELET # BLD AUTO: 156 10E9/L (ref 150–450)
RBC # BLD AUTO: 4.97 10E12/L (ref 4.4–5.9)
WBC # BLD AUTO: 4.5 10E9/L (ref 4–11)

## 2018-03-28 PROCEDURE — 80048 BASIC METABOLIC PNL TOTAL CA: CPT | Performed by: FAMILY MEDICINE

## 2018-03-28 PROCEDURE — 71046 X-RAY EXAM CHEST 2 VIEWS: CPT | Mod: FY

## 2018-03-28 PROCEDURE — 99214 OFFICE O/P EST MOD 30 MIN: CPT | Performed by: FAMILY MEDICINE

## 2018-03-28 PROCEDURE — 85025 COMPLETE CBC W/AUTO DIFF WBC: CPT | Performed by: FAMILY MEDICINE

## 2018-03-28 PROCEDURE — 36415 COLL VENOUS BLD VENIPUNCTURE: CPT | Performed by: FAMILY MEDICINE

## 2018-03-28 RX ORDER — OMEPRAZOLE 40 MG/1
CAPSULE, DELAYED RELEASE ORAL
Qty: 90 CAPSULE | Refills: 1 | Status: SHIPPED | OUTPATIENT
Start: 2018-03-28 | End: 2018-09-14

## 2018-03-28 NOTE — LETTER
[employer]  [address] [facility] [address] [phone]                                                                                                March 30, 2018    Carlos Faith  [address]   [facility] [address]        Dear Mr. Faith,    The results of your recent lab tests were within normal limits. Enclosed is a copy of these results.  If you have any further questions or problems, please contact our office.    Results for orders placed or performed in visit on 03/28/18   CBC with platelets and differential   Result Value Ref Range    WBC 4.5 4.0 - 11.0 10e9/L    RBC Count 4.97 4.4 - 5.9 10e12/L    Hemoglobin 15.5 13.3 - 17.7 g/dL    Hematocrit 45.6 40.0 - 53.0 %    MCV 92 78 - 100 fl    MCH 31.2 26.5 - 33.0 pg    MCHC 34.0 31.5 - 36.5 g/dL    RDW 14.6 10.0 - 15.0 %    Platelet Count 156 150 - 450 10e9/L    Diff Method Automated Method     % Neutrophils 67.5 %    % Lymphocytes 17.5 %    % Monocytes 14.4 %    % Eosinophils 0.4 %    % Basophils 0.2 %    Absolute Neutrophil 3.1 1.6 - 8.3 10e9/L    Absolute Lymphocytes 0.8 0.8 - 5.3 10e9/L    Absolute Monocytes 0.7 0.0 - 1.3 10e9/L    Absolute Eosinophils 0.0 0.0 - 0.7 10e9/L    Absolute Basophils 0.0 0.0 - 0.2 10e9/L   Basic metabolic panel   Result Value Ref Range    Sodium 140 133 - 144 mmol/L    Potassium 4.0 3.4 - 5.3 mmol/L    Chloride 107 94 - 109 mmol/L    Carbon Dioxide 27 20 - 32 mmol/L    Anion Gap 6 3 - 14 mmol/L    Glucose 99 70 - 99 mg/dL    Urea Nitrogen 21 7 - 30 mg/dL    Creatinine 1.11 0.66 - 1.25 mg/dL    GFR Estimate 63 >60 mL/min/1.7m2    GFR Estimate If Black 77 >60 mL/min/1.7m2    Calcium 8.6 8.5 - 10.1 mg/dL       Sincerely,      Antoinette Bagley, DO/ruchi

## 2018-03-28 NOTE — MR AVS SNAPSHOT
"              After Visit Summary   3/28/2018    Carlos Faith    MRN: [id number]           Patient Information     Date Of Birth          1935        Visit Information        Provider Department      3/28/2018 1:00 PM Antoinette Bagley DO [facility]        Today's Diagnoses     Adjustment disorder with anxious mood    -  1    KANDI (generalized anxiety disorder)        Moderate single current episode of major depressive disorder (H)        Cough        Screening for diabetes mellitus        Gastroesophageal reflux disease with esophagitis          Care Instructions    Bring in advance directive  Your xray looks good, labs are initally great  Continue to follow symptoms call if not resolving or worsening  Follow up in 3-6 months            Follow-ups after your visit        Who to contact     If you have questions or need follow up information about today's clinic visit or your schedule please contact [facility] directly at [phone].  Normal or non-critical lab and imaging results will be communicated to you by MyChart, letter or phone within 4 business days after the clinic has received the results. If you do not hear from us within 7 days, please contact the clinic through MyChart or phone. If you have a critical or abnormal lab result, we will notify you by phone as soon as possible.  Submit refill requests through [employer] or call your pharmacy and they will forward the refill request to us. Please allow 3 business days for your refill to be completed.          Additional Information About Your Visit        MyChart Information     [employer] lets you send messages to your doctor, view your test results, renew your prescriptions, schedule appointments and more. To sign up, go to [email or web address] . Click on \"Log in\" on the left side of the screen, which will take you to the Welcome page. Then click on \"Sign up Now\" on the right side of the page.     You " will be asked to enter the access code listed below, as well as some personal information. Please follow the directions to create your username and password.     Your access code is: [email or web address]  Expires: 2018  1:56 PM     Your access code will  in 90 days. If you need help or a new code, please call your [place] clinic or [phone].        Care EveryWhere ID     This is your Care EveryWhere ID. This could be used by other organizations to access your [place] medical records  [id number]        Your Vitals Were     Pulse Temperature Pulse Oximetry BMI (Body Mass Index)          77 98.8  F (37.1  C) (Oral) 96% 21.58 kg/m2         Blood Pressure from Last 3 Encounters:   18 130/68   18 120/74   18 110/70    Weight from Last 3 Encounters:   18 144 lb (65.3 kg)   18 138 lb 9.6 oz (62.9 kg)   18 138 lb (62.6 kg)              We Performed the Following     Basic metabolic panel     CBC with platelets and differential          Today's Medication Changes          These changes are accurate as of 3/28/18  1:56 PM.  If you have any questions, ask your nurse or doctor.               These medicines have changed or have updated prescriptions.        Dose/Directions    omeprazole 40 MG capsule   Commonly known as:  priLOSEC   This may have changed:  See the new instructions.   Used for:  Gastroesophageal reflux disease with esophagitis   Changed by:  Antoinette Bagley DO        TAKE 1 CAPSULE DAILY 30-60 MINUTES BEFORE A MEAL   Quantity:  90 capsule   Refills:  1            Where to get your medicines      These medications were sent to [facility]/pharmacy #[id number] - [place], [place] - [address] [address] [facility]  [address], [place] [address]     Phone:  [phone]     omeprazole 40 MG capsule                Primary Care Provider Office Phone # Fax #    Antoinette Bagley [phone]       [address] [place] [address]  [place] [address]        Equal Access to Services     [facility] : Hadii modesta chatterjee jet Morin, waleticiada luqadaha, qaybta kaalmada jose a, elier henning. So [facility] [phone].    ATENCIÓN: Si habla español, tiene a stout disposición servicios gratuitos de asistencia lingüística. Xiaoame al [phone].    We comply with applicable federal civil rights laws and [place] laws. We do not discriminate on the basis of race, color, national origin, age, disability, sex, sexual orientation, or gender identity.            Thank you!     Thank you for choosing [facility]  for your care. Our goal is always to provide you with excellent care. Hearing back from our patients is one way we can continue to improve our services. Please take a few minutes to complete the written survey that you may receive in the mail after your visit with us. Thank you!             Your Updated Medication List - Protect others around you: Learn how to safely use, store and throw away your medicines at www.disposemymeds.org.          This list is accurate as of 3/28/18  1:56 PM.  Always use your most recent med list.                   Brand Name Dispense Instructions for use Diagnosis    amitriptyline 25 MG tablet    ELAVIL    90 tablet    Take 1 tablet (25 mg) by mouth At Bedtime    Adjustment disorder with anxious mood       busPIRone 15 MG tablet    BUSPAR    180 tablet    Take 1 tablet (15 mg) by mouth 2 times daily    KANDI (generalized anxiety disorder)       glucosamine-chondroitin 500-400 MG Caps per capsule      Take 1 capsule by mouth daily        latanoprost 0.005 % ophthalmic solution    XALATAN     INSTILL 1 DROP INTO BOTH EYES EVERY EVENING AS DIRECTED        mirtazapine 30 MG tablet    REMERON    90 tablet    Take 1 tablet (30 mg) by mouth At Bedtime    KANDI (generalized anxiety disorder)       omeprazole 40 MG capsule    priLOSEC    90 capsule    TAKE 1 CAPSULE DAILY 30-60 MINUTES BEFORE A MEAL     Gastroesophageal reflux disease with esophagitis       order for DME     1 Box    Equipment being ordered: Straight Cath  - box, size and length per patient preference    Urinary tract infection, site unspecified       simvastatin 5 MG tablet    ZOCOR    180 tablet    Take 2 tablets (10 mg) by mouth At Bedtime    Mixed hyperlipidemia       SLO-NIACIN PO           TURMERIC PO      Take by mouth daily

## 2018-03-28 NOTE — LETTER
[facility] [place] [facility]  [address] [facility] [address]  [phone]                                                                                                March 30, 2018    Carlos Faith  [address]   [place] [address]        Dear James Faith,    The results of your recent lab tests were within normal limits. Enclosed is a copy of these results.  If you have any further questions or problems, please contact our office.    Results for orders placed or performed in visit on 03/28/18   XR Chest 2 Views    Narrative    XR CHEST 2 VW 3/28/2018 3:54 PM    HISTORY: ; Cough      Impression    IMPRESSION: Negative.    JULIET DOHERTY MD         Sincerely,      Antoinette Bagley, DO/mv

## 2018-03-28 NOTE — PATIENT INSTRUCTIONS
Bring in advance directive  Your xray looks good, labs are initally great  Continue to follow symptoms call if not resolving or worsening  Follow up in 3-6 months

## 2018-03-29 LAB
ANION GAP SERPL CALCULATED.3IONS-SCNC: 6 MMOL/L (ref 3–14)
BUN SERPL-MCNC: 21 MG/DL (ref 7–30)
CALCIUM SERPL-MCNC: 8.6 MG/DL (ref 8.5–10.1)
CHLORIDE SERPL-SCNC: 107 MMOL/L (ref 94–109)
CO2 SERPL-SCNC: 27 MMOL/L (ref 20–32)
CREAT SERPL-MCNC: 1.11 MG/DL (ref 0.66–1.25)
GFR SERPL CREATININE-BSD FRML MDRD: 63 ML/MIN/1.7M2
GLUCOSE SERPL-MCNC: 99 MG/DL (ref 70–99)
POTASSIUM SERPL-SCNC: 4 MMOL/L (ref 3.4–5.3)
SODIUM SERPL-SCNC: 140 MMOL/L (ref 133–144)

## 2018-06-22 ENCOUNTER — TELEPHONE (OUTPATIENT)
Dept: FAMILY MEDICINE | Facility: CLINIC | Age: 83
End: 2018-06-22

## 2018-06-22 NOTE — TELEPHONE ENCOUNTER
Renato Gonzalez, I m Kailyn Koehler. I work with Laurita Gold at [facility]. He/she noticed in your chart that you are due for a patient health questionnaire. We would like to complete that today as Laurita Gold cares about your health.        Called patient; left message x1. (If patient has sucidal thoughts discuss with Team RN ASAP) <5 PASS, >5 FAIL Needs follow up appointment.  If patient refuses appointment please ask them if they would be willing to speak to the Team RN for further support over the phone.     If PHQ-9 is less than 5, close encounter. If PHQ-9 is 5 or greater, recommend that patient schedule follow up appointment with primary provider (in office, e-visit or phone visit) for medication follow up and evaluation. If positive suicidal thoughts, huddle with RN or provider today and route encounter.     Appointment Made? No    Kailyn Koehler

## 2018-07-13 ENCOUNTER — TELEPHONE (OUTPATIENT)
Dept: FAMILY MEDICINE | Facility: CLINIC | Age: 83
End: 2018-07-13

## 2018-07-13 NOTE — TELEPHONE ENCOUNTER
Per patient, he developed a worsening rash on his chest and abd over the past month.  Rash is raised and red.  Denies any pain or itchiness.  No fever, no blisters, no discomfort.  Denies any new meds, hygiene products, laundry detergent, etc.  Has not tried anything for his symptoms   Advised patient to be seen    Appointment made for 7/18/18    Jania Santamaria RN

## 2018-07-13 NOTE — TELEPHONE ENCOUNTER
Reason for call:  Patient reporting a symptom    Symptom or request: Rash    Duration (how long have symptoms been present): Couple weeks     Have you been treated for this before? No    Additional comments: Possible med issues?    Phone Number patient can be reached at:  Cell number on file:    Telephone Information:   Mobile [phone]       Best Time:  Any     Can we leave a detailed message on this number:  YES    Call taken on 7/13/2018 at 2:02 PM by Chris Mackenzie

## 2018-07-16 ENCOUNTER — OFFICE VISIT (OUTPATIENT)
Dept: FAMILY MEDICINE | Facility: CLINIC | Age: 83
End: 2018-07-16
Payer: COMMERCIAL

## 2018-07-16 VITALS
WEIGHT: 149 LBS | BODY MASS INDEX: 22.07 KG/M2 | TEMPERATURE: 97 F | HEIGHT: 69 IN | RESPIRATION RATE: 16 BRPM | OXYGEN SATURATION: 96 % | DIASTOLIC BLOOD PRESSURE: 82 MMHG | HEART RATE: 98 BPM | SYSTOLIC BLOOD PRESSURE: 142 MMHG

## 2018-07-16 DIAGNOSIS — F32.1 MODERATE SINGLE CURRENT EPISODE OF MAJOR DEPRESSIVE DISORDER (H): ICD-10-CM

## 2018-07-16 DIAGNOSIS — F41.1 GAD (GENERALIZED ANXIETY DISORDER): Primary | ICD-10-CM

## 2018-07-16 DIAGNOSIS — L25.9 CONTACT DERMATITIS, UNSPECIFIED CONTACT DERMATITIS TYPE, UNSPECIFIED TRIGGER: ICD-10-CM

## 2018-07-16 PROCEDURE — 99213 OFFICE O/P EST LOW 20 MIN: CPT | Performed by: NURSE PRACTITIONER

## 2018-07-16 RX ORDER — TRIAMCINOLONE ACETONIDE 1 MG/G
CREAM TOPICAL
Qty: 30 G | Refills: 0 | Status: SHIPPED | OUTPATIENT
Start: 2018-07-16 | End: 2018-08-01

## 2018-07-16 ASSESSMENT — ANXIETY QUESTIONNAIRES
7. FEELING AFRAID AS IF SOMETHING AWFUL MIGHT HAPPEN: NOT AT ALL
2. NOT BEING ABLE TO STOP OR CONTROL WORRYING: NOT AT ALL
5. BEING SO RESTLESS THAT IT IS HARD TO SIT STILL: NOT AT ALL
GAD7 TOTAL SCORE: 0
3. WORRYING TOO MUCH ABOUT DIFFERENT THINGS: NOT AT ALL
6. BECOMING EASILY ANNOYED OR IRRITABLE: NOT AT ALL
1. FEELING NERVOUS, ANXIOUS, OR ON EDGE: NOT AT ALL

## 2018-07-16 ASSESSMENT — PATIENT HEALTH QUESTIONNAIRE - PHQ9: 5. POOR APPETITE OR OVEREATING: NOT AT ALL

## 2018-07-16 ASSESSMENT — PAIN SCALES - GENERAL: PAINLEVEL: NO PAIN (0)

## 2018-07-16 NOTE — MR AVS SNAPSHOT
After Visit Summary   7/16/2018    Carlos Faith    MRN: [id number]           Patient Information     Date Of Birth          1935        Visit Information        Provider Department      7/16/2018 2:00 PM Laurita Gold APRN [facility]        Today's Diagnoses     KANDI (generalized anxiety disorder)    -  1    Moderate single current episode of major depressive disorder (H)        Contact dermatitis, unspecified contact dermatitis type, unspecified trigger          Care Instructions    [facility]    If you have any questions regarding to your visit please contact your care team:     Team Pink:   Clinic Hours Telephone Number   Internal Medicine:  Dr. Juana Gold, NP       7am-7pm  Monday - Thursday   7am-5pm  Fridays  [phone]  (Appointment scheduling available 24/7)    Questions about your recent visit?  Team Line  [phone]   Urgent Care - [place] and [facility] - 11am-9pm Monday-Friday Saturday-Sunday- 9am-5pm   [place] - 5pm-9pm Monday-Friday Saturday-Sunday- 9am-5pm  [phone] - [place]  [phone] - [place]       What options do I have for a visit other than an office visit? We offer electronic visits (e-visits) and telephone visits, when medically appropriate.  Please check with your medical insurance to see if these types of visits are covered, as you will be responsible for any charges that are not paid by your insurance.      You can use [employer] (secure electronic communication) to access to your chart, send your primary care provider a message, or make an appointment. Ask a team member how to get started.     For a price quote for your services, please call our Consumer Price Line at [phone] or our Imaging Cost estimation line at [phone] (for imaging tests).  Discharged By: An            Follow-ups after your visit        Who to contact     If you have  "questions or need follow up information about today's clinic visit or your schedule please contact [facility] directly at [phone].  Normal or non-critical lab and imaging results will be communicated to you by MyChart, letter or phone within 4 business days after the clinic has received the results. If you do not hear from us within 7 days, please contact the clinic through MyChart or phone. If you have a critical or abnormal lab result, we will notify you by phone as soon as possible.  Submit refill requests through [employer] or call your pharmacy and they will forward the refill request to us. Please allow 3 business days for your refill to be completed.          Additional Information About Your Visit        Care EveryWhere ID     This is your Care EveryWhere ID. This could be used by other organizations to access your [place] medical records  [id number]        Your Vitals Were     Pulse Temperature Respirations Height Pulse Oximetry BMI (Body Mass Index)    98 97  F (36.1  C) (Oral) 16 5' 8.5\" (1.74 m) 96% 22.33 kg/m2       Blood Pressure from Last 3 Encounters:   07/16/18 142/82   03/28/18 130/68   01/24/18 120/74    Weight from Last 3 Encounters:   07/16/18 149 lb (67.6 kg)   03/28/18 144 lb (65.3 kg)   01/24/18 138 lb 9.6 oz (62.9 kg)              Today, you had the following     No orders found for display         Today's Medication Changes          These changes are accurate as of 7/16/18  2:36 PM.  If you have any questions, ask your nurse or doctor.               Start taking these medicines.        Dose/Directions    triamcinolone 0.1 % cream   Commonly known as:  KENALOG   Used for:  Contact dermatitis, unspecified contact dermatitis type, unspecified trigger   Started by:  Laurita Gold APRN CNP        Apply sparingly to affected area three times daily for 14 days.   Quantity:  30 g   Refills:  0            Where to get your medicines      These medications were sent to " [id number] - [place], [place] - [address] [facility]  [address], [place] [address]     Phone:  [phone]     triamcinolone 0.1 % cream                Primary Care Provider Office Phone # Fax #    Antoinette Bagley [phone]       [address] [facility] [address]        Equal Access to Services     LAURA ROUSE : Hadii aad ku hadasho Soomaali, waaxda luqadaha, qaybta kaalmada adeegyada, waxay idiin hayaan adeeg kieranarafreya lagarrett . So [facility] [phone].    ATENCIÓN: Si filomenala espyodit, tiene a stout disposición servicios gratuitos de asistencia lingüística. [facility] [phone].    We comply with applicable federal civil rights laws and [place] laws. We do not discriminate on the basis of race, color, national origin, age, disability, sex, sexual orientation, or gender identity.            Thank you!     [facility]  [facility]. Our goal is always to provide you with excellent care. Hearing back from our patients is one way we can continue to improve our services. Please take a few minutes to complete the written survey that you may receive in the mail after your visit with us. Thank you!             Your Updated Medication List - Protect others around you: Learn how to safely use, store and throw away your medicines at www.disposemymeds.org.          This list is accurate as of 7/16/18  2:36 PM.  Always use your most recent med list.                   Brand Name Dispense Instructions for use Diagnosis    amitriptyline 25 MG tablet    ELAVIL    90 tablet    Take 1 tablet (25 mg) by mouth At Bedtime    Adjustment disorder with anxious mood       busPIRone 15 MG tablet    BUSPAR    180 tablet    Take 1 tablet (15 mg) by mouth 2 times daily    KANDI (generalized anxiety disorder)       glucosamine-chondroitin 500-400 MG Caps per capsule      Take 1 capsule by mouth daily        latanoprost 0.005 %  ophthalmic solution    XALATAN     INSTILL 1 DROP INTO BOTH EYES EVERY EVENING AS DIRECTED        mirtazapine 30 MG tablet    REMERON    90 tablet    Take 1 tablet (30 mg) by mouth At Bedtime    KANDI (generalized anxiety disorder)       omeprazole 40 MG capsule    priLOSEC    90 capsule    TAKE 1 CAPSULE DAILY 30-60 MINUTES BEFORE A MEAL    Gastroesophageal reflux disease with esophagitis       order for DME     1 Box    Equipment being ordered: Straight Cath  - box, size and length per patient preference    Urinary tract infection, site unspecified       simvastatin 5 MG tablet    ZOCOR    180 tablet    Take 2 tablets (10 mg) by mouth At Bedtime    Mixed hyperlipidemia       SLO-NIACIN PO           triamcinolone 0.1 % cream    KENALOG    30 g    Apply sparingly to affected area three times daily for 14 days.    Contact dermatitis, unspecified contact dermatitis type, unspecified trigger       TURMERIC PO      Take by mouth daily

## 2018-07-16 NOTE — PATIENT INSTRUCTIONS
[facility]    If you have any questions regarding to your visit please contact your care team:     Team Pink:   Clinic Hours Telephone Number   Internal Medicine:  Dr. Juana Gold NP       7am-7pm  Monday - Thursday   7am-5pm  Fridays  [phone]  (Appointment scheduling available 24/7)    Questions about your recent visit?  Team Line  [phone]   Urgent Care - [place] - 11am-9pm Monday-Friday Saturday-Sunday- 9am-5pm   [place] - 5pm-9pm Monday-Friday Saturday-Sunday- 9am-5pm  [phone] - [place]  [phone] - [place]       What options do I have for a visit other than an office visit? We offer electronic visits (e-visits) and telephone visits, when medically appropriate.  Please check with your medical insurance to see if these types of visits are covered, as you will be responsible for any charges that are not paid by your insurance.      You can use [employer] (secure electronic communication) to access to your chart, send your primary care provider a message, or make an appointment. Ask a team member how to get started.     For a price quote for your services, please call our Consumer Price Line at [phone] or our Imaging Cost estimation line at [phone] (for imaging tests).  Discharged By: An

## 2018-07-16 NOTE — PROGRESS NOTES
SUBJECTIVE:   Carlos Faith is a 83 year old male who presents to clinic today for the following health issues:        Rash  Onset: a month ago    Description:   Location: chest and abdmin  Character: blotchy, red  Itching (Pruritis): no     Progression of Symptoms:  worsening    Accompanying Signs & Symptoms:  Fever: no   Body aches or joint pain: no   Sore throat symptoms: no   Recent cold symptoms: no     History:   Previous similar rash: YES- shingles    Precipitating factors:   Exposure to similar rash: no   New exposures: None   Recent travel: no     Alleviating factors:  Nothing    Therapies Tried and outcome: nothing    Patient denies soap or detergent changes, outdoor exposure to area.    Depression and Anxiety Follow-Up    Status since last visit: Improved     Other associated symptoms:None    Complicating factors:     Significant life event: No     Current substance abuse: None    PHQ-9 2/14/2017 12/21/2017 1/4/2018   Total Score 0 6 10   Q9: Suicide Ideation Not at all Not at all Several days     KANDI-7 SCORE 2/14/2017 12/21/2017 1/4/2018   Total Score 0 15 10     In the past two weeks have you had thoughts of suicide or self-harm?  No.    Do you have concerns about your personal safety or the safety of others?   No  PHQ-9  English  PHQ-9   Any Language  KANDI-7  Suicide Assessment Five-step Evaluation and Treatment (SAFE-T)    Problem list and histories reviewed & adjusted, as indicated.  Additional history: as documented    Patient Active Problem List   Diagnosis     H/O prostate cancer     History of penile implant     Mixed hyperlipidemia     Adjustment disorder with anxious mood     Gastroesophageal reflux disease without esophagitis     Moderate single current episode of major depressive disorder (H)     KANDI (generalized anxiety disorder)     Past Surgical History:   Procedure Laterality Date      penile implant  1994     CATARACT IOL, RT/LT       CYSTOSCOPY, DILATE URETHRA, COMBINED N/A  6/1/2017    Procedure: COMBINED CYSTOSCOPY, DILATE URETHRA;  Cysto, urethral ballon dilation and Holmium laser Lithotripsy;  Surgeon: Jaunito Vaughan MD;  Location: [facility]     HERNIA REPAIR, INGUINAL RT/LT       PROSTATE SURGERY  1994    Prostatectomy       Social History   Substance Use Topics     Smoking status: Never Smoker     Smokeless tobacco: Never Used     Alcohol use 0.0 oz/week     0 Standard drinks or equivalent per week      Comment: Wine     Family History   Problem Relation Age of Onset     Coronary Artery Disease Father      83 MI     Prostate Cancer Paternal Grandfather          Current Outpatient Prescriptions   Medication Sig Dispense Refill     amitriptyline (ELAVIL) 25 MG tablet Take 1 tablet (25 mg) by mouth At Bedtime 90 tablet 1     busPIRone (BUSPAR) 15 MG tablet Take 1 tablet (15 mg) by mouth 2 times daily 180 tablet 1     glucosamine-chondroitin 500-400 MG CAPS Take 1 capsule by mouth daily       latanoprost (XALATAN) 0.005 % ophthalmic solution INSTILL 1 DROP INTO BOTH EYES EVERY EVENING AS DIRECTED  1     mirtazapine (REMERON) 30 MG tablet Take 1 tablet (30 mg) by mouth At Bedtime 90 tablet 1     omeprazole (PRILOSEC) 40 MG capsule TAKE 1 CAPSULE DAILY 30-60 MINUTES BEFORE A MEAL 90 capsule 1     order for DME Equipment being ordered: Straight Cath  - box, size and length per patient preference 1 Box 1     simvastatin (ZOCOR) 5 MG tablet Take 2 tablets (10 mg) by mouth At Bedtime 180 tablet 3     SLO-NIACIN PO        triamcinolone (KENALOG) 0.1 % cream Apply sparingly to affected area three times daily for 14 days. 30 g 0     TURMERIC PO Take by mouth daily       [DISCONTINUED] mirtazapine (REMERON) 15 MG tablet TAKE 1 TABLET (15 MG) BY MOUTH AT BEDTIME (Patient not taking: Reported on 7/16/2018) 30 tablet 8     Allergies   Allergen Reactions     Sulfa Drugs Rash     BP Readings from Last 3 Encounters:   07/16/18 142/82   03/28/18 130/68   01/24/18 120/74    Wt Readings from Last 3  "Encounters:   07/16/18 149 lb (67.6 kg)   03/28/18 144 lb (65.3 kg)   01/24/18 138 lb 9.6 oz (62.9 kg)                  Labs reviewed in EPIC    Reviewed and updated as needed this visit by clinical staff  Tobacco  Allergies  Meds  Problems  Med Hx  Surg Hx  Fam Hx  Soc Hx        Reviewed and updated as needed this visit by Provider  Allergies  Meds  Problems         ROS:  Constitutional, HEENT, cardiovascular, pulmonary, gi and gu systems are negative, except as otherwise noted.    OBJECTIVE:     /82  Pulse 98  Temp 97  F (36.1  C) (Oral)  Resp 16  Ht 5' 8.5\" (1.74 m)  Wt 149 lb (67.6 kg)  SpO2 96%  BMI 22.33 kg/m2  Body mass index is 22.33 kg/(m^2).  GENERAL: healthy, alert and no distress  RESP: lungs clear to auscultation - no rales, rhonchi or wheezes  CV: regular rate and rhythm, normal S1 S2, no S3 or S4, no murmur, click or rub, no peripheral edema and peripheral pulses strong  MS: no gross musculoskeletal defects noted, no edema  SKIN: erythematous papular rash with overlying scale noted to abdomen, chest    Diagnostic Test Results:  none     ASSESSMENT/PLAN:     1. KANDI (generalized anxiety disorder)  Stable.  Continue current treatment plan and medications.      2. Moderate single current episode of major depressive disorder (H)  Stable.  Continue current treatment plan and medications.      3. Contact dermatitis, unspecified contact dermatitis type, unspecified trigger    - triamcinolone (KENALOG) 0.1 % cream; Apply sparingly to affected area three times daily for 14 days.  Dispense: 30 g; Refill: 0    FUTURE APPOINTMENTS:       - Follow-up for annual visit or as needed    FLORENTINO Rutherford CNP  [facility]      "

## 2018-07-17 ASSESSMENT — ANXIETY QUESTIONNAIRES: GAD7 TOTAL SCORE: 0

## 2018-07-17 ASSESSMENT — PATIENT HEALTH QUESTIONNAIRE - PHQ9: SUM OF ALL RESPONSES TO PHQ QUESTIONS 1-9: 0

## 2018-08-15 ENCOUNTER — OFFICE VISIT (OUTPATIENT)
Dept: FAMILY MEDICINE | Facility: CLINIC | Age: 83
End: 2018-08-15
Payer: COMMERCIAL

## 2018-08-15 ENCOUNTER — TELEPHONE (OUTPATIENT)
Dept: FAMILY MEDICINE | Facility: CLINIC | Age: 83
End: 2018-08-15

## 2018-08-15 VITALS
BODY MASS INDEX: 21.77 KG/M2 | HEART RATE: 87 BPM | OXYGEN SATURATION: 99 % | TEMPERATURE: 98.1 F | DIASTOLIC BLOOD PRESSURE: 82 MMHG | SYSTOLIC BLOOD PRESSURE: 138 MMHG | WEIGHT: 147 LBS | HEIGHT: 69 IN

## 2018-08-15 DIAGNOSIS — F32.1 MODERATE SINGLE CURRENT EPISODE OF MAJOR DEPRESSIVE DISORDER (H): ICD-10-CM

## 2018-08-15 DIAGNOSIS — F41.1 GAD (GENERALIZED ANXIETY DISORDER): ICD-10-CM

## 2018-08-15 DIAGNOSIS — R03.0 ELEVATED BLOOD PRESSURE READING WITHOUT DIAGNOSIS OF HYPERTENSION: ICD-10-CM

## 2018-08-15 DIAGNOSIS — R21 RASH: Primary | ICD-10-CM

## 2018-08-15 DIAGNOSIS — L25.9 CONTACT DERMATITIS, UNSPECIFIED CONTACT DERMATITIS TYPE, UNSPECIFIED TRIGGER: ICD-10-CM

## 2018-08-15 PROCEDURE — 99214 OFFICE O/P EST MOD 30 MIN: CPT | Performed by: FAMILY MEDICINE

## 2018-08-15 RX ORDER — BETAMETHASONE DIPROPIONATE 0.5 MG/G
OINTMENT, AUGMENTED TOPICAL
Qty: 60 G | Refills: 0 | Status: SHIPPED | OUTPATIENT
Start: 2018-08-15 | End: 2019-04-24

## 2018-08-15 RX ORDER — PREDNISONE 20 MG/1
20 TABLET ORAL 2 TIMES DAILY
Qty: 14 TABLET | Refills: 0 | Status: SHIPPED | OUTPATIENT
Start: 2018-08-15 | End: 2018-08-22

## 2018-08-15 RX ORDER — CLOBETASOL PROPIONATE 0.5 MG/G
OINTMENT TOPICAL
Qty: 60 G | Refills: 1 | Status: SHIPPED | OUTPATIENT
Start: 2018-08-15 | End: 2019-04-24

## 2018-08-15 RX ORDER — TRIAMCINOLONE ACETONIDE 1 MG/G
CREAM TOPICAL
Qty: 30 G | Refills: 1 | Status: CANCELLED | OUTPATIENT
Start: 2018-08-15

## 2018-08-15 RX ORDER — MULTIPLE VITAMINS W/ MINERALS TAB 9MG-400MCG
1 TAB ORAL DAILY
COMMUNITY

## 2018-08-15 NOTE — TELEPHONE ENCOUNTER
Reason for Call:  Other     Detailed comments: [facility] Pharmacist Cesar stated that clobetasol (TEMOVATE) 0.05 % ointment not covered by insurance, requesting to speak to pcp nurse. Please advise.     Phone Number Patient can be reached at: Other phone number:  [phone]    Best Time: Anytime    Can we leave a detailed message on this number? Not Applicable    Call taken on 8/15/2018 at 4:19 PM by Miryam Arreaga

## 2018-08-15 NOTE — TELEPHONE ENCOUNTER
Spoke to pharmacy this cream is not on formulary do you wish to change RX? Over 200$   Chel Childs,Clinic Rn  [facility] [place]

## 2018-08-15 NOTE — PROGRESS NOTES
"  SUBJECTIVE:   Carlos Faith is a 83 year old male who presents to clinic today for the following health issues:      Rash  Onset: 2-3 months    Description:   Location: Chest/ stomach  Character: red bumps  Itching (Pruritis): no     Progression of Symptoms:  worsening    Accompanying Signs & Symptoms:  Fever: no   Body aches or joint pain: no   Sore throat symptoms: no   Recent cold symptoms: no     History:   Previous similar rash: no     Precipitating factors:   Exposure to similar rash: no   New exposures: Thinks it could be from his medication- Buspar or Mirtazapine   Recent travel: no     Alleviating factors:      Therapies Tried and outcome: Triam Cream- no relief- only got 2 little tubes and used it all up    Carlos is present today for rash located only on chest and abdomen since three months ago that was noted to have improved slightly on the abdomen area with triamcinolone but the tube was small and rash became to reflare again on abdomen. Denies rash is spreading. However, he stated that rash began on abdomen all the sudden that spread to the chest area. Denies scratching skin or pain or tenderness. Denies skin blistering or vesicular lesions. He stated that he wears looser clothes, and stated that he changes clothes daily. Denies loofa use. No known changes to detergents or soaps. Denies new clothes.  He stated that he played picket ball once when it was hot outside, but never took off his shirt. Ever since he quit playing picket ball due to \"aggravating\" rash.      He attributes rash is caused by after starting Buspar and Remeron 6 or 7 months ago after worsening depression and wanting to shoot himself, but later reported \"not like that\". He was taking Elavil prior to this episode.   OF NOTE Carlos has a history of prostate cancer that was diagnosed in the distant past and he reports of an ulcer diagnosed 50 years ago.         Problem list and histories reviewed & adjusted, as " indicated.  Additional history: as documented    Patient Active Problem List   Diagnosis     H/O prostate cancer     History of penile implant     Mixed hyperlipidemia     Adjustment disorder with anxious mood     Gastroesophageal reflux disease without esophagitis     Moderate single current episode of major depressive disorder (H)     KANDI (generalized anxiety disorder)     Past Surgical History:   Procedure Laterality Date      penile implant  1994     CATARACT IOL, RT/LT       CYSTOSCOPY, DILATE URETHRA, COMBINED N/A 6/1/2017    Procedure: COMBINED CYSTOSCOPY, DILATE URETHRA;  Cysto, urethral ballon dilation and Holmium laser Lithotripsy;  Surgeon: Juanito Vaughan MD;  Location: [facility]     HERNIA REPAIR, INGUINAL RT/LT       PROSTATE SURGERY  1994    Prostatectomy       Social History   Substance Use Topics     Smoking status: Never Smoker     Smokeless tobacco: Never Used     Alcohol use 0.0 oz/week     0 Standard drinks or equivalent per week      Comment: Wine     Family History   Problem Relation Age of Onset     Coronary Artery Disease Father      83 MI     Prostate Cancer Paternal Grandfather          Current Outpatient Prescriptions   Medication Sig Dispense Refill     amitriptyline (ELAVIL) 25 MG tablet Take 1 tablet (25 mg) by mouth At Bedtime 90 tablet 1     busPIRone (BUSPAR) 15 MG tablet Take 1 tablet (15 mg) by mouth 2 times daily 180 tablet 1     clobetasol (TEMOVATE) 0.05 % ointment Apply sparingly to affected area twice daily as needed.  Do not apply to face. 60 g 1     glucosamine-chondroitin 500-400 MG CAPS Take 1 capsule by mouth daily       latanoprost (XALATAN) 0.005 % ophthalmic solution INSTILL 1 DROP INTO BOTH EYES EVERY EVENING AS DIRECTED  1     mirtazapine (REMERON) 30 MG tablet Take 1 tablet (30 mg) by mouth At Bedtime 90 tablet 1     multivitamin, therapeutic with minerals (MULTI-VITAMIN) TABS tablet Take 1 tablet by mouth daily       omeprazole (PRILOSEC) 40 MG capsule TAKE 1  CAPSULE DAILY 30-60 MINUTES BEFORE A MEAL 90 capsule 1     order for DME Equipment being ordered: Straight Cath  - box, size and length per patient preference 1 Box 1     simvastatin (ZOCOR) 5 MG tablet Take 2 tablets (10 mg) by mouth At Bedtime 180 tablet 3     SLO-NIACIN PO        triamcinolone (KENALOG) 0.1 % cream APPLY SPARINGLY TO AFFECTED AREA THREE TIMES DAILY FOR 14 DAYS. 30 g 1     TURMERIC PO Take by mouth daily       Allergies   Allergen Reactions     Sulfa Drugs Rash     Recent Labs   Lab Test  03/28/18   1334  01/24/18   1027  05/22/17   1448  10/04/16   1445 07/03/15  01/02/14 12/28/12   LDL   --   102*   --   130*  80   < >  157  136   HDL   --   73   --   62  59   < >  65  57   TRIG   --   69   --   76  51   < >  143  67   ALT   --    --    --    --   26   --   20  18   CR  1.11   --   1.15  1.10  1.30   --   1.10  1.20   GFRESTIMATED  63   --   61  64  56   --   69  63   GFRESTBLACK  77   --   74  78  68   --   83  76   POTASSIUM  4.0   --   4.2  4.2  4.6   --   4.5  5.5    < > = values in this interval not displayed.      BP Readings from Last 3 Encounters:   08/15/18 146/84   07/16/18 142/82   03/28/18 130/68    Wt Readings from Last 3 Encounters:   08/15/18 66.7 kg (147 lb)   07/16/18 67.6 kg (149 lb)   03/28/18 65.3 kg (144 lb)                  Labs reviewed in EPIC    Reviewed and updated as needed this visit by clinical staff  Tobacco  Allergies  Meds  Med Hx  Surg Hx  Fam Hx  Soc Hx      Reviewed and updated as needed this visit by Provider         ROS:  Constitutional, HEENT, cardiovascular, pulmonary, GI, , musculoskeletal, neuro, skin, endocrine and psych systems are negative, except as otherwise noted.    This document serves as a record of the services and decisions personally performed and made by Antoinette Bagley D.O. It was created on her behalf by Alireza Hoover, a trained medical scribe. The creation of this document is based on the provider's statements to the medical  "elsie  Alireza Sneha [birth year]      OBJECTIVE:     /84 (BP Location: Right arm, Patient Position: Sitting, Cuff Size: Adult Regular)  Pulse 87  Temp 98.1  F (36.7  C) (Oral)  Ht 1.74 m (5' 8.5\")  Wt 66.7 kg (147 lb)  SpO2 99%  BMI 22.03 kg/m2  Body mass index is 22.03 kg/(m^2).  GENERAL: healthy, alert and no distress  NECK: no adenopathy, no asymmetry, masses, or scars and thyroid normal to palpation  RESP: lungs clear to auscultation - no rales, rhonchi or wheezes  CV: regular rate and rhythm, normal S1 S2, no S3 or S4, no murmur, click or rub, no peripheral edema and peripheral pulses strong  ABDOMEN: soft, nontender, no hepatosplenomegaly, no masses and bowel sounds normal  MS: no gross musculoskeletal defects noted, no edema  SKIN: erythematous papular rash with overlying scale noted to abdomen, chest  NEURO: Normal strength and tone, mentation intact and speech normal  PSYCH: mentation appears normal, affect normal/bright    Diagnostic Test Results:  none     ASSESSMENT/PLAN:     BP Screening:   Last 3 BP Readings:    BP Readings from Last 3 Encounters:   08/15/18 138/82   07/16/18 142/82   03/28/18 130/68       The following was recommended to the patient:  Re-screen BP within a year and recommended lifestyle modifications  Tobacco Cessation:   reports that he has never smoked. He has never used smokeless tobacco.      BMI:   Estimated body mass index is 22.03 kg/(m^2) as calculated from the following:    Height as of this encounter: 1.74 m (5' 8.5\").    Weight as of this encounter: 66.7 kg (147 lb).         1. Rash  Carlos will begin to apply clobetasol cream on affected areas and take prednisone as directed for persisting rash for two to three months. If rash persists after two days follow up with Dermatology advised.   - clobetasol (TEMOVATE) 0.05 % ointment; Apply sparingly to affected area twice daily as needed.  Do not apply to face.  Dispense: 60 g; Refill: 1  - DERMATOLOGY " REFERRAL  - predniSONE (DELTASONE) 20 MG tablet; Take 1 tablet (20 mg) by mouth 2 times daily for 7 days  Dispense: 14 tablet; Refill: 0    2. Contact dermatitis, unspecified contact dermatitis type, unspecified trigger  Rash appears to be of contact dermatitis. Denies scratching skin or pain or tenderness. Denies skin blistering or vesicular leasions. He stated that he wears looser clothes, and stated that he changes clothes daily. Denies lofa use. No known changes to detergents or soaps. Denies new clothes.  - clobetasol (TEMOVATE) 0.05 % ointment; Apply sparingly to affected area twice daily as needed.  Do not apply to face.  Dispense: 60 g; Refill: 1  - DERMATOLOGY REFERRAL    3. Moderate single current episode of major depressive disorder (H)  4. KANDI (generalized anxiety disorder)  Stable with elavil, Remeron, and Buspar. He will continue taking as directed.      5. Elevated blood pressure reading without diagnosis of hypertension  Blood pressures has been elevated from three prior visits. I advised Carlos to please stop by the pharmacy to recheck blood pressure.       Patient instructions discussed with pt    The information in this document, created by the medical scribe for me, accurately reflects the services I personally performed and the decisions made by me. I have reviewed and approved this document for accuracy prior to leaving the patient care area.  August 15, 2018 4:38 PM        Antoinette Bagley DO  [facility]

## 2018-08-15 NOTE — PATIENT INSTRUCTIONS
Begin clobetasol and apply on affected areas, if rash persists after two days follow up with Dermatology.   Also begin prednisone as directed. .   Please stop by the pharmacy to recheck blood pressure.

## 2018-08-15 NOTE — MR AVS SNAPSHOT
After Visit Summary   8/15/2018    Carlos Faith    MRN: [id number]           Patient Information     Date Of Birth          1935        Visit Information        Provider Department      8/15/2018 3:20 PM Antoinette Bagley DO [facility]        Today's Diagnoses     Rash    -  1    Contact dermatitis, unspecified contact dermatitis type, unspecified trigger        Moderate single current episode of major depressive disorder (H)        KANDI (generalized anxiety disorder)        Elevated blood pressure reading without diagnosis of hypertension          Care Instructions    Begin clobetasol and apply on affected areas, if rash persists after two days follow up with Dermatology.   Also begin prednisone as directed. .   Please stop by the pharmacy to recheck blood pressure.           Follow-ups after your visit        Additional Services     DERMATOLOGY REFERRAL       Your provider has referred you to: FREDI: [facility] [place] [phone]   [email or web address]    Please be aware that coverage of these services is subject to the terms and limitations of your health insurance plan.  Call member services at your health plan with any benefit or coverage questions.      Please bring the following with you to your appointment:    (1) Any X-Rays, CTs or MRIs which have been performed.  Contact the facility where they were done to arrange for  prior to your scheduled appointment.    (2) List of current medications  (3) This referral request   (4) Any documents/labs given to you for this referral                  Who to contact     If you have questions or need follow up information about today's clinic visit or your schedule please contact [facility] directly at [phone].  Normal or non-critical lab and imaging results will be communicated to you by MyChart, letter or phone within 4 business days after the clinic has  "received the results. If you do not hear from us within 7 days, please contact the clinic through [employer] or phone. If you have a critical or abnormal lab result, we will notify you by phone as soon as possible.  Submit refill requests through [employer] or call your pharmacy and they will forward the refill request to us. Please allow 3 business days for your refill to be completed.          Additional Information About Your Visit        Care EveryWhere ID     This is your Care EveryWhere ID. This could be used by other organizations to access your [place] medical records  [id number]        Your Vitals Were     Pulse Temperature Height Pulse Oximetry BMI (Body Mass Index)       87 98.1  F (36.7  C) (Oral) 5' 8.5\" (1.74 m) 99% 22.03 kg/m2        Blood Pressure from Last 3 Encounters:   08/15/18 146/84   07/16/18 142/82   03/28/18 130/68    Weight from Last 3 Encounters:   08/15/18 147 lb (66.7 kg)   07/16/18 149 lb (67.6 kg)   03/28/18 144 lb (65.3 kg)              We Performed the Following     DERMATOLOGY REFERRAL          Today's Medication Changes          These changes are accurate as of 8/15/18  3:48 PM.  If you have any questions, ask your nurse or doctor.               Start taking these medicines.        Dose/Directions    clobetasol 0.05 % ointment   Commonly known as:  TEMOVATE   Used for:  Rash, Contact dermatitis, unspecified contact dermatitis type, unspecified trigger   Started by:  Antoinette Bagley, DO        Apply sparingly to affected area twice daily as needed.  Do not apply to face.   Quantity:  60 g   Refills:  1       predniSONE 20 MG tablet   Commonly known as:  DELTASONE   Used for:  Rash   Started by:  Kevyn Hellina Tegagne, DO        Dose:  20 mg   Take 1 tablet (20 mg) by mouth 2 times daily for 7 days   Quantity:  14 tablet   Refills:  0            Where to get your medicines      These medications were sent to [id number] [place] [address] [address] [facility]  [address] [address], [facility] [address]     Phone:  [phone]     clobetasol 0.05 % ointment         Some of these will need a paper prescription and others can be bought over the counter.  Ask your nurse if you have questions.     Bring a paper prescription for each of these medications     predniSONE 20 MG tablet                Primary Care Provider Office Phone # Fax #    Antoinette Bagley [phone]       [address] [place] [address]        Equal Access to Services     LAURA ROUSE : Hadii modesta chatterjee hadasho Soomaali, waaxda luqadaha, qaybta kaalmada adeegyada, waxjayme henning. So [facility] [phone].    ATENCIÓN: Si habla español, tiene a stout disposición servicios gratuitos de asistencia lingüística. Llame al [phone].    We comply with applicable federal civil rights laws and [place] laws. We do not discriminate on the basis of race, color, national origin, age, disability, sex, sexual orientation, or gender identity.            Thank you!     Thank you for choosing [facility]  for your care. Our goal is always to provide you with excellent care. Hearing back from our patients is one way we can continue to improve our services. Please take a few minutes to complete the written survey that you may receive in the mail after your visit with us. Thank you!             Your Updated Medication List - Protect others around you: Learn how to safely use, store and throw away your medicines at www.disposemymeds.org.          This list is accurate as of 8/15/18  3:48 PM.  Always use your most recent med list.                   Brand Name Dispense Instructions for use Diagnosis    amitriptyline 25 MG tablet    ELAVIL    90 tablet    Take 1 tablet (25 mg) by mouth At Bedtime    Adjustment disorder with anxious mood       busPIRone 15 MG tablet    BUSPAR    180 tablet    Take 1 tablet (15 mg) by mouth 2 times daily     KANDI (generalized anxiety disorder)       clobetasol 0.05 % ointment    TEMOVATE    60 g    Apply sparingly to affected area twice daily as needed.  Do not apply to face.    Rash, Contact dermatitis, unspecified contact dermatitis type, unspecified trigger       glucosamine-chondroitin 500-400 MG Caps per capsule      Take 1 capsule by mouth daily        latanoprost 0.005 % ophthalmic solution    XALATAN     INSTILL 1 DROP INTO BOTH EYES EVERY EVENING AS DIRECTED        mirtazapine 30 MG tablet    REMERON    90 tablet    Take 1 tablet (30 mg) by mouth At Bedtime    KANDI (generalized anxiety disorder)       Multi-vitamin Tabs tablet      Take 1 tablet by mouth daily        omeprazole 40 MG capsule    priLOSEC    90 capsule    TAKE 1 CAPSULE DAILY 30-60 MINUTES BEFORE A MEAL    Gastroesophageal reflux disease with esophagitis       order for DME     1 Box    Equipment being ordered: Straight Cath  - box, size and length per patient preference    Urinary tract infection, site unspecified       predniSONE 20 MG tablet    DELTASONE    14 tablet    Take 1 tablet (20 mg) by mouth 2 times daily for 7 days    Rash       simvastatin 5 MG tablet    ZOCOR    180 tablet    Take 2 tablets (10 mg) by mouth At Bedtime    Mixed hyperlipidemia       SLO-NIACIN PO           triamcinolone 0.1 % cream    KENALOG    30 g    APPLY SPARINGLY TO AFFECTED AREA THREE TIMES DAILY FOR 14 DAYS.    Contact dermatitis, unspecified contact dermatitis type, unspecified trigger       TURMERIC PO      Take by mouth daily

## 2018-08-15 NOTE — TELEPHONE ENCOUNTER
Changed to betamethasone, if this is not covered , they need to tell us what is covered  Antoinette Bagley D.O.

## 2018-08-16 DIAGNOSIS — F41.1 GAD (GENERALIZED ANXIETY DISORDER): ICD-10-CM

## 2018-08-16 RX ORDER — MIRTAZAPINE 30 MG/1
TABLET, FILM COATED ORAL
Qty: 90 TABLET | Refills: 1 | Status: SHIPPED | OUTPATIENT
Start: 2018-08-16 | End: 2019-04-24

## 2018-08-17 ENCOUNTER — TELEPHONE (OUTPATIENT)
Dept: FAMILY MEDICINE | Facility: CLINIC | Age: 83
End: 2018-08-17

## 2018-08-17 DIAGNOSIS — R21 RASH: Primary | ICD-10-CM

## 2018-08-17 RX ORDER — TRIAMCINOLONE ACETONIDE OINTMENT USP, 0.05% 0.5 MG/G
OINTMENT TOPICAL
Qty: 60 G | Refills: 0 | Status: SHIPPED | OUTPATIENT
Start: 2018-08-17 | End: 2019-04-24

## 2018-08-17 RX ORDER — TRIAMCINOLONE ACETONIDE 5 MG/G
CREAM TOPICAL
Qty: 30 G | Refills: 0 | Status: CANCELLED | OUTPATIENT
Start: 2018-08-17

## 2018-08-17 NOTE — TELEPHONE ENCOUNTER
Reason for Call:  Medication or medication refill:    Do you use a [facility]?  Name of the pharmacy and phone number for the current request[facility] [address], [place] [phone]    Name of the medication requested: pharmacy is calling because Trianex 0.05 ointment is not covered by insurance. But the 0.5 ointment is covered please send an alternate RX    Other request:   Can we leave a detailed message on this number? YES    Phone number patient can be reached at: Other phone number: [phone]  Best Time: ASAP    Call taken on 8/17/2018 at 4:25 PM by Emma Martinez

## 2018-08-17 NOTE — TELEPHONE ENCOUNTER
Reason for Call:  Other     Detailed comments: patient was in to see Dr Bagley on 8/15/18 and she had prescribed a creme for his rash.  The medication cost is over $400. Patient is needing something different prescribed.  Please call patient and let him know.    Phone Number Patient can be reached at: Home number on file [phone] (home)    Best Time: any    Can we leave a detailed message on this number? YES    Call taken on 8/17/2018 at 11:53 AM by Luda Mays

## 2018-08-17 NOTE — TELEPHONE ENCOUNTER
Called patient, relayed message & patient verbalized understanding. Patient states the pharmacy states they will cover it and get it in on Monday.  Halie Snyder RN

## 2018-08-17 NOTE — TELEPHONE ENCOUNTER
Please see previous encounter  I will send another one  I have already changed it 2 times,ask what is covered please  Antoinette Bagley D.O.

## 2018-08-20 NOTE — TELEPHONE ENCOUNTER
Called [facility] and they state the triamcinolone 0.1% from 8/1 is covered and will fill it for him. Informed patient and he states that is fine and will go pick it up.   Halie Snyder RN

## 2018-08-31 DIAGNOSIS — F43.22 ADJUSTMENT DISORDER WITH ANXIOUS MOOD: ICD-10-CM

## 2018-08-31 NOTE — TELEPHONE ENCOUNTER
"Requested Prescriptions   Pending Prescriptions Disp Refills     amitriptyline (ELAVIL) 25 MG tablet [Pharmacy Med Name: AMITRIPTYLINE HCL 25 MG TAB] 90 tablet 1     Sig: TAKE 1 TABLET (25 MG) BY MOUTH AT BEDTIME  Last Written Prescription Date:  02/13/18  Last Fill Quantity: 90,  # refills: 1   Last office visit: 8/15/2018 with prescribing provider:  Kevyn   Future Office Visit:  '    Tricyclic Agents ( Annual appt and no PHQ9) Passed    8/31/2018 11:41 AM       Passed - Blood Pressure under 140/90 in past 12 mos    BP Readings from Last 3 Encounters:   08/15/18 138/82   07/16/18 142/82   03/28/18 130/68                Passed - Recent (12 mo) or future (30 days) visit within authorizing provider's specialty    Patient had office visit in the last 12 months or has a visit in the next 30 days with authorizing provider or within the authorizing provider's specialty.  See \"Patient Info\" tab in inbasket, or \"Choose Columns\" in Meds & Orders section of the refill encounter.           Passed - Patient is age 18 or older          "

## 2018-09-07 RX ORDER — PREDNISONE 20 MG/1
TABLET ORAL
Qty: 14 TABLET | Refills: 0 | OUTPATIENT
Start: 2018-09-07

## 2018-09-07 NOTE — TELEPHONE ENCOUNTER
predniSONE (DELTASONE) 20 MG tablet      Last Written Prescription Date:  8/15/2018  Last Fill Quantity: 14 tablet,   # refills: 0  Last Office Visit: 8/15/2018   w/ KARAN Bagley    Future Office visit:       Routing refill request to provider for review/approval because:  Drug not on the [facility], [facility] or [facility] refill protocol or controlled substance

## 2018-09-07 NOTE — TELEPHONE ENCOUNTER
"8/15 OV note says: If rash persists after two days follow up with Dermatology advised. Called patient- he says his rash is almost gone but he was hoping for \"one more round to get it to go away, but is aware that it can be hard on kidneys. The cream didn't help much.\" Advised patient to call dermatology. Patient verbalized understanding but he will see how it goes.    Halie Snyder RN   "

## 2018-09-11 DIAGNOSIS — F41.1 GAD (GENERALIZED ANXIETY DISORDER): ICD-10-CM

## 2018-09-11 RX ORDER — BUSPIRONE HYDROCHLORIDE 15 MG/1
TABLET ORAL
Qty: 180 TABLET | Refills: 1 | Status: SHIPPED | OUTPATIENT
Start: 2018-09-11 | End: 2019-03-10

## 2018-09-11 NOTE — TELEPHONE ENCOUNTER
busPIRone (BUSPAR) 15 MG tablet 180 tablet 1 9/11/2018  No      Sig: TAKE 1 TABLET (15 MG) BY MOUTH 2 TIMES DAILY     Class: E-Prescribe     Order: [id number]     E-Prescribing Status: Receipt confirmed by pharmacy (9/11/2018 10:16 AM CDT)         Contacted pharmacy verified duplicate refill request.   Please disregard.   Thank you   Annie

## 2018-09-12 RX ORDER — BUSPIRONE HYDROCHLORIDE 15 MG/1
TABLET ORAL
Qty: 180 TABLET | Refills: 1
Start: 2018-09-12

## 2018-09-14 DIAGNOSIS — K21.00 GASTROESOPHAGEAL REFLUX DISEASE WITH ESOPHAGITIS: ICD-10-CM

## 2018-09-14 RX ORDER — OMEPRAZOLE 40 MG/1
CAPSULE, DELAYED RELEASE ORAL
Qty: 90 CAPSULE | Refills: 2 | Status: SHIPPED | OUTPATIENT
Start: 2018-09-14 | End: 2019-04-24

## 2018-09-14 NOTE — TELEPHONE ENCOUNTER
"Requested Prescriptions   Pending Prescriptions Disp Refills     omeprazole (PRILOSEC) 40 MG capsule [Pharmacy Med Name: OMEPRAZOLE DR 40 MG CAPSULE]  Last Written Prescription Date:  3/28/2018  Last Fill Quantity: 90 capsule,  # refills: 1   Last office visit: 8/15/2018 with prescribing provider:  KARAN Bagley   Future Office Visit:     90 capsule 1     Sig: TAKE 1 CAPSULE DAILY 30-60 MINUTES BEFORE A MEAL    PPI Protocol Passed    9/14/2018  1:25 PM       Passed - Not on Clopidogrel (unless Pantoprazole ordered)       Passed - No diagnosis of osteoporosis on record       Passed - Recent (12 mo) or future (30 days) visit within the authorizing provider's specialty    Patient had office visit in the last 12 months or has a visit in the next 30 days with authorizing provider or within the authorizing provider's specialty.  See \"Patient Info\" tab in inbasket, or \"Choose Columns\" in Meds & Orders section of the refill encounter.           Passed - Patient is age 18 or older          "

## 2018-09-14 NOTE — TELEPHONE ENCOUNTER
Prescription approved per [facility] Refill Protocol.    Melo Castillo RN

## 2018-12-05 ENCOUNTER — ALLIED HEALTH/NURSE VISIT (OUTPATIENT)
Dept: NURSING | Facility: CLINIC | Age: 83
End: 2018-12-05
Payer: COMMERCIAL

## 2018-12-05 DIAGNOSIS — Z23 NEED FOR SHINGLES VACCINE: Primary | ICD-10-CM

## 2018-12-05 PROCEDURE — 99207 ZZC NO CHARGE NURSE ONLY: CPT

## 2018-12-05 PROCEDURE — 90471 IMMUNIZATION ADMIN: CPT

## 2018-12-05 PROCEDURE — 90750 HZV VACC RECOMBINANT IM: CPT

## 2018-12-05 NOTE — MR AVS SNAPSHOT
After Visit Summary   12/5/2018    Carlos Faith    MRN: [id number]           Patient Information     Date Of Birth          1935        Visit Information        Provider Department      12/5/2018 1:30 PM [facility]        Today's Diagnoses     Need for shingles vaccine    -  1       Follow-ups after your visit        Your next 10 appointments already scheduled     Feb 06, 2019  3:00 PM CST   Nurse Only with [facility]   [facility]    [address] [place] [id number] [phone]              Who to contact     If you have questions or need follow up information about today's clinic visit or your schedule please contact [facility] directly at [phone].  Normal or non-critical lab and imaging results will be communicated to you by MyChart, letter or phone within 4 business days after the clinic has received the results. If you do not hear from us within 7 days, please contact the clinic through MyChart or phone. If you have a critical or abnormal lab result, we will notify you by phone as soon as possible.  Submit refill requests through [employer] or call your pharmacy and they will forward the refill request to us. Please allow 3 business days for your refill to be completed.          Additional Information About Your Visit        Care EveryWhere ID     This is your Care EveryWhere ID. This could be used by other organizations to access your [place] medical records  [id number]         Blood Pressure from Last 3 Encounters:   08/15/18 138/82   07/16/18 142/82   03/28/18 130/68    Weight from Last 3 Encounters:   08/15/18 147 lb (66.7 kg)   07/16/18 149 lb (67.6 kg)   03/28/18 144 lb (65.3 kg)              We Performed the Following     VACCINE ADMINISTRATION, INITIAL     ZOSTER VACCINE RECOMBINANT ADJUVANTED IM NJX        Primary Care Provider Office Phone # Fax #     Antoinette Bagley,  [phone]       [address] [facility] [address]        Equal Access to Services     LAURA ROUSE : Hadii aad ku hadronnello Socaylaali, waleticiada luqadaha, qaybta kaalmada jose a, elier matta conneramada hall grisel henning. So [facility] [phone].    ATENCIÓN: Si habla español, tiene a stout disposición servicios gratuitos de asistencia lingüística. Llame al [phone].    We comply with applicable federal civil rights laws and [place] laws. We do not discriminate on the basis of race, color, national origin, age, disability, sex, sexual orientation, or gender identity.            Thank you!     Thank you for choosing [facility]  for your care. Our goal is always to provide you with excellent care. Hearing back from our patients is one way we can continue to improve our services. Please take a few minutes to complete the written survey that you may receive in the mail after your visit with us. Thank you!             Your Updated Medication List - Protect others around you: Learn how to safely use, store and throw away your medicines at www.disposemymeds.org.          This list is accurate as of 12/5/18  2:02 PM.  Always use your most recent med list.                   Brand Name Dispense Instructions for use Diagnosis    amitriptyline 25 MG tablet    ELAVIL    90 tablet    TAKE 1 TABLET (25 MG) BY MOUTH AT BEDTIME    Adjustment disorder with anxious mood       augmented betamethasone dipropionate 0.05 % external ointment    DIPROLENE-AF    60 g    Apply sparingly to affected area twice daily for 14 days.  Do not apply to face.    Rash       busPIRone 15 MG tablet    BUSPAR    180 tablet    TAKE 1 TABLET (15 MG) BY MOUTH 2 TIMES DAILY    KANDI (generalized anxiety disorder)       clobetasol 0.05 % external ointment    TEMOVATE    60 g    Apply sparingly to affected area twice daily as needed.  Do not apply to face.    Rash, Contact dermatitis, unspecified  contact dermatitis type, unspecified trigger       glucosamine-chondroitin 500-400 MG Caps per capsule      Take 1 capsule by mouth daily        latanoprost 0.005 % ophthalmic solution    XALATAN     INSTILL 1 DROP INTO BOTH EYES EVERY EVENING AS DIRECTED        mirtazapine 30 MG tablet    REMERON    90 tablet    TAKE 1 TABLET (30 MG) BY MOUTH AT BEDTIME    KANDI (generalized anxiety disorder)       Multi-vitamin tablet      Take 1 tablet by mouth daily        omeprazole 40 MG DR capsule    priLOSEC    90 capsule    TAKE 1 CAPSULE DAILY 30-60 MINUTES BEFORE A MEAL    Gastroesophageal reflux disease with esophagitis       order for DME     1 Box    Equipment being ordered: Straight Cath  - box, size and length per patient preference    Urinary tract infection, site unspecified       simvastatin 5 MG tablet    ZOCOR    180 tablet    Take 2 tablets (10 mg) by mouth At Bedtime    Mixed hyperlipidemia       SLO-NIACIN PO           * triamcinolone 0.1 % external cream    KENALOG    30 g    APPLY SPARINGLY TO AFFECTED AREA THREE TIMES DAILY FOR 14 DAYS.    Contact dermatitis, unspecified contact dermatitis type, unspecified trigger       * triamcinolone acetonide 0.05 % Oint     60 g    Externally apply topically 2 times daily    Rash       TURMERIC PO      Take by mouth daily        * Notice:  This list has 2 medication(s) that are the same as other medications prescribed for you. Read the directions carefully, and ask your doctor or other care provider to review them with you.

## 2018-12-05 NOTE — NURSING NOTE
Prior to injection verified patient identity using patient's name and date of birth.  Due to injection administration, patient instructed to remain in clinic for 15 minutes  afterwards, and to report any adverse reaction to me immediately.    Screening Questionnaire for Adult Immunization    Are you sick today?   No   Do you have allergies to medications, food, a vaccine component or latex?   Yes, sulfa drugs   Have you ever had a serious reaction after receiving a vaccination?   No   Do you have a long-term health problem with heart disease, lung disease, asthma, kidney disease, metabolic disease (e.g. diabetes), anemia, or other blood disorder?   No   Do you have cancer, leukemia, HIV/AIDS, or any other immune system problem?   No   In the past 3 months, have you taken medications that affect  your immune system, such as prednisone, other steroids, or anticancer drugs; drugs for the treatment of rheumatoid arthritis, Crohn s disease, or psoriasis; or have you had radiation treatments?   No   Have you had a seizure, or a brain or other nervous system problem?   No   During the past year, have you received a transfusion of blood or blood     products, or been given immune (gamma) globulin or antiviral drug?   No   For women: Are you pregnant or is there a chance you could become        pregnant during the next month?   No   Have you received any vaccinations in the past 4 weeks?   No     Immunization questionnaire was positive for at least one answer.  Notified Dr. Hdz.        Injection of Shingrix given by Sonja Rich. Patient instructed to remain in clinic for 15 minutes afterwards, and to report any adverse reaction to me immediately.       Screening performed by Sonja Rich on 12/5/2018 at 1:36 PM.

## 2018-12-10 DIAGNOSIS — F41.1 GAD (GENERALIZED ANXIETY DISORDER): ICD-10-CM

## 2018-12-12 RX ORDER — BUSPIRONE HYDROCHLORIDE 15 MG/1
15 TABLET ORAL 2 TIMES DAILY
Qty: 180 TABLET | Refills: 1 | OUTPATIENT
Start: 2018-12-12

## 2019-02-26 DIAGNOSIS — E78.2 MIXED HYPERLIPIDEMIA: ICD-10-CM

## 2019-02-26 DIAGNOSIS — F43.22 ADJUSTMENT DISORDER WITH ANXIOUS MOOD: ICD-10-CM

## 2019-02-26 RX ORDER — SIMVASTATIN 5 MG
10 TABLET ORAL AT BEDTIME
Qty: 60 TABLET | Refills: 0 | Status: SHIPPED | OUTPATIENT
Start: 2019-02-26 | End: 2019-04-12

## 2019-02-26 NOTE — TELEPHONE ENCOUNTER
Patient has depression listed on problem list.    PHQ-9 score:    PHQ-9 SCORE 7/16/2018   PHQ-9 Total Score 0         Last visit was 8/15/18.    Routing refill request to provider for review/approval because:  No follow up plan for depression.      Elma Eng RN  [facility]

## 2019-02-26 NOTE — TELEPHONE ENCOUNTER
"Requested Prescriptions   Pending Prescriptions Disp Refills     amitriptyline (ELAVIL) 25 MG tablet [Pharmacy Med Name: AMITRIPTYLINE HCL 25 MG TAB]  Last Written Prescription Date:  8/31/2018  Last Fill Quantity: 90 tabs,  # refills: 1   Last office visit: 8/15/2018 with prescribing provider:  Kevyn   Future Office Visit:     90 tablet 1     Sig: TAKE 1 TABLET (25 MG) BY MOUTH AT BEDTIME    Tricyclic Agents ( Annual appt and no PHQ9) Passed - 2/26/2019 12:59 AM       Passed - Blood Pressure under 140/90 in past 12 mos    BP Readings from Last 3 Encounters:   08/15/18 138/82   07/16/18 142/82   03/28/18 130/68                Passed - Recent (12 mo) or future (30 days) visit within authorizing provider's specialty    Patient had office visit in the last 12 months or has a visit in the next 30 days with authorizing provider or within the authorizing provider's specialty.  See \"Patient Info\" tab in inbasket, or \"Choose Columns\" in Meds & Orders section of the refill encounter.             Passed - Medication is active on med list       Passed - Patient is age 18 or older          "

## 2019-02-26 NOTE — TELEPHONE ENCOUNTER
Signed Prescriptions:                        Disp   Refills    simvastatin (ZOCOR) 5 MG tablet            60 tab*0        Sig: Take 2 tablets (10 mg) by mouth At Bedtime Follow up           for refills  Authorizing Provider: JUWAN PENA  Ordering User: DANITZA CANELA refill given. Patient must follow up with provider before any further refills will be sent.     Danitza aCnela RN

## 2019-03-10 DIAGNOSIS — F41.1 GAD (GENERALIZED ANXIETY DISORDER): ICD-10-CM

## 2019-03-11 RX ORDER — BUSPIRONE HYDROCHLORIDE 15 MG/1
TABLET ORAL
Qty: 180 TABLET | Refills: 1 | Status: SHIPPED | OUTPATIENT
Start: 2019-03-11 | End: 2019-09-05

## 2019-03-12 DIAGNOSIS — F41.1 GAD (GENERALIZED ANXIETY DISORDER): ICD-10-CM

## 2019-03-12 NOTE — TELEPHONE ENCOUNTER
Spoke to pharmacy and they stated that they just spoke to patient about medication. medication is a duplicate

## 2019-03-13 RX ORDER — BUSPIRONE HYDROCHLORIDE 15 MG/1
TABLET ORAL
Qty: 180 TABLET | Refills: 1
Start: 2019-03-13

## 2019-04-12 DIAGNOSIS — E78.2 MIXED HYPERLIPIDEMIA: ICD-10-CM

## 2019-04-15 RX ORDER — SIMVASTATIN 5 MG
10 TABLET ORAL AT BEDTIME
Qty: 60 TABLET | Refills: 0 | Status: SHIPPED | OUTPATIENT
Start: 2019-04-15 | End: 2019-04-24

## 2019-04-15 NOTE — TELEPHONE ENCOUNTER
"Routing refill request to provider for review/approval because:  Labs not current:  LDL    Requested Prescriptions   Pending Prescriptions Disp Refills     simvastatin (ZOCOR) 5 MG tablet [Pharmacy Med Name: SIMVASTATIN 5 MG TABLET] 60 tablet 0     Sig: TAKE 2 TABLETS (10 MG) BY MOUTH AT BEDTIME FOLLOW UP FOR REFILLS       Statins Protocol Failed - 4/15/2019  8:18 AM        Failed - LDL on file in past 12 months     Recent Labs   Lab Test 01/24/18  1027   *             Passed - No abnormal creatine kinase in past 12 months     No lab results found.             Passed - Recent (12 mo) or future (30 days) visit within the authorizing provider's specialty     Patient had office visit in the last 12 months or has a visit in the next 30 days with authorizing provider or within the authorizing provider's specialty.  See \"Patient Info\" tab in inbasket, or \"Choose Columns\" in Meds & Orders section of the refill encounter.              Passed - Medication is active on med list        Passed - Patient is age 18 or older        Madeleine Shabazz RN  "

## 2019-04-15 NOTE — TELEPHONE ENCOUNTER
Spoke to patient and made appointment 4/24/2019.  Lacy SCHNEIDER CMA ([facility])

## 2019-04-15 NOTE — TELEPHONE ENCOUNTER
Okay for one 30 day refill once follow-up is scheduled.  Patient will also need fasting labs.    Laurita Gold, CNP

## 2019-04-23 NOTE — PROGRESS NOTES
SUBJECTIVE:   Carlos Faith is a 84 year old male who presents to clinic today for the following   health issues:      Hyperlipidemia Follow-Up      Rate your low fat/cholesterol diet?: good    Taking statin?  Yes, no muscle aches from statin    Other lipid medications/supplements?:  none      Amount of exercise or physical activity: 2-3 days/week for an average of greater than 60 minutes    Problems taking medications regularly: No    Medication side effects: none    Diet: regular (no restrictions)      Medication Followup of Simvastatin 5 mg    Taking Medication as prescribed: yes    Side Effects:  None    Medication Helping Symptoms:  yes       Additional history: as documented    Reviewed  and updated as needed this visit by clinical staff         Reviewed and updated as needed this visit by Provider         Patient Active Problem List   Diagnosis     H/O prostate cancer     History of penile implant     Mixed hyperlipidemia     Gastroesophageal reflux disease without esophagitis     Moderate single current episode of major depressive disorder (H)     KANDI (generalized anxiety disorder)     Past Surgical History:   Procedure Laterality Date      penile implant  1994     CATARACT IOL, RT/LT       CYSTOSCOPY, DILATE URETHRA, COMBINED N/A 6/1/2017    Procedure: COMBINED CYSTOSCOPY, DILATE URETHRA;  Cysto, urethral ballon dilation and Holmium laser Lithotripsy;  Surgeon: Juanito Vaughan MD;  Location: [facility] OR     HERNIA REPAIR, INGUINAL RT/LT       PROSTATE SURGERY  1994    Prostatectomy       Social History     Tobacco Use     Smoking status: Never Smoker     Smokeless tobacco: Never Used   Substance Use Topics     Alcohol use: Yes     Alcohol/week: 0.0 oz     Comment: Wine     Family History   Problem Relation Age of Onset     Coronary Artery Disease Father         83 MI     Prostate Cancer Paternal Grandfather          Current Outpatient Medications   Medication Sig Dispense Refill     busPIRone (BUSPAR)  "15 MG tablet TAKE 1 TABLET (15 MG) BY MOUTH 2 TIMES DAILY 180 tablet 1     glucosamine-chondroitin 500-400 MG CAPS Take 1 capsule by mouth daily       mirtazapine (REMERON) 30 MG tablet TAKE 1 TABLET (30 MG) BY MOUTH AT BEDTIME 90 tablet 1     multivitamin, therapeutic with minerals (MULTI-VITAMIN) TABS tablet Take 1 tablet by mouth daily       omeprazole (PRILOSEC) 40 MG DR capsule Take 1 capsule (40 mg) by mouth daily 90 capsule 3     simvastatin (ZOCOR) 10 MG tablet Take 1 tablet (10 mg) by mouth At Bedtime 90 tablet 3     SLO-NIACIN PO        TURMERIC PO Take by mouth daily       latanoprost (XALATAN) 0.005 % ophthalmic solution INSTILL 1 DROP INTO BOTH EYES EVERY EVENING AS DIRECTED  1     Allergies   Allergen Reactions     Sulfa Drugs Rash     BP Readings from Last 3 Encounters:   04/24/19 124/60   08/15/18 138/82   07/16/18 142/82    Wt Readings from Last 3 Encounters:   04/24/19 66.2 kg (146 lb)   08/15/18 66.7 kg (147 lb)   07/16/18 67.6 kg (149 lb)                  Labs reviewed in EPIC    ROS:  Constitutional, HEENT, cardiovascular, pulmonary, gi and gu systems are negative, except as otherwise noted.    OBJECTIVE:     /60   Pulse 106   Temp 97.6  F (36.4  C) (Oral)   Resp 20   Ht 1.74 m (5' 8.5\")   Wt 66.2 kg (146 lb)   SpO2 98%   BMI 21.88 kg/m    Body mass index is 21.88 kg/m .  GENERAL: healthy, alert and no distress  RESP: lungs clear to auscultation - no rales, rhonchi or wheezes  CV: regular rate and rhythm, normal S1 S2, no S3 or S4, no murmur, click or rub, no peripheral edema and peripheral pulses strong  MS: no gross musculoskeletal defects noted, no edema  PSYCH: mentation appears normal, affect normal/bright    Diagnostic Test Results:  none     ASSESSMENT/PLAN:     1. Moderate single current episode of major depressive disorder (H)  Patient to continue Remeron, Buspar.  Will have patient wean off amitriptyline due to age.  He is agreeable to this and will notify clinic if mood " worsens.    2. Mixed hyperlipidemia  Stable.  Continue current treatment plan and medications.    - Lipid panel reflex to direct LDL Fasting  - simvastatin (ZOCOR) 10 MG tablet; Take 1 tablet (10 mg) by mouth At Bedtime  Dispense: 90 tablet; Refill: 3  - Basic metabolic panel    3. H/O prostate cancer    - PSA, tumor marker    4. KANDI (generalized anxiety disorder)  Stable.  Continue current treatment plan and medications.    - mirtazapine (REMERON) 30 MG tablet; TAKE 1 TABLET (30 MG) BY MOUTH AT BEDTIME  Dispense: 90 tablet; Refill: 1    5. Need for prophylactic vaccination with tetanus-diphtheria (Td)  Patient declines.    6. Gastroesophageal reflux disease with esophagitis  Stable.  Continue current treatment plan and medications.    - omeprazole (PRILOSEC) 40 MG DR capsule; Take 1 capsule (40 mg) by mouth daily  Dispense: 90 capsule; Refill: 3    FUTURE APPOINTMENTS:       - Follow-up visit in 6 months.    FLORENTINO Rutherford CNP  [facility]

## 2019-04-24 ENCOUNTER — OFFICE VISIT (OUTPATIENT)
Dept: FAMILY MEDICINE | Facility: CLINIC | Age: 84
End: 2019-04-24
Payer: MEDICARE

## 2019-04-24 VITALS
SYSTOLIC BLOOD PRESSURE: 124 MMHG | TEMPERATURE: 97.6 F | BODY MASS INDEX: 21.62 KG/M2 | HEIGHT: 69 IN | HEART RATE: 106 BPM | OXYGEN SATURATION: 98 % | RESPIRATION RATE: 20 BRPM | WEIGHT: 146 LBS | DIASTOLIC BLOOD PRESSURE: 60 MMHG

## 2019-04-24 DIAGNOSIS — F32.1 MODERATE SINGLE CURRENT EPISODE OF MAJOR DEPRESSIVE DISORDER (H): Primary | ICD-10-CM

## 2019-04-24 DIAGNOSIS — K21.00 GASTROESOPHAGEAL REFLUX DISEASE WITH ESOPHAGITIS: ICD-10-CM

## 2019-04-24 DIAGNOSIS — Z85.46 H/O PROSTATE CANCER: ICD-10-CM

## 2019-04-24 DIAGNOSIS — Z23 NEED FOR PROPHYLACTIC VACCINATION WITH TETANUS-DIPHTHERIA (TD): ICD-10-CM

## 2019-04-24 DIAGNOSIS — F41.1 GAD (GENERALIZED ANXIETY DISORDER): ICD-10-CM

## 2019-04-24 DIAGNOSIS — E78.2 MIXED HYPERLIPIDEMIA: ICD-10-CM

## 2019-04-24 LAB
ANION GAP SERPL CALCULATED.3IONS-SCNC: 7 MMOL/L (ref 3–14)
BUN SERPL-MCNC: 27 MG/DL (ref 7–30)
CALCIUM SERPL-MCNC: 9.2 MG/DL (ref 8.5–10.1)
CHLORIDE SERPL-SCNC: 109 MMOL/L (ref 94–109)
CHOLEST SERPL-MCNC: 175 MG/DL
CO2 SERPL-SCNC: 25 MMOL/L (ref 20–32)
CREAT SERPL-MCNC: 1.05 MG/DL (ref 0.66–1.25)
GFR SERPL CREATININE-BSD FRML MDRD: 65 ML/MIN/{1.73_M2}
GLUCOSE SERPL-MCNC: 95 MG/DL (ref 70–99)
HDLC SERPL-MCNC: 56 MG/DL
LDLC SERPL CALC-MCNC: 101 MG/DL
NONHDLC SERPL-MCNC: 119 MG/DL
POTASSIUM SERPL-SCNC: 4 MMOL/L (ref 3.4–5.3)
PSA SERPL-MCNC: <0.01 UG/L (ref 0–4)
SODIUM SERPL-SCNC: 141 MMOL/L (ref 133–144)
TRIGL SERPL-MCNC: 90 MG/DL

## 2019-04-24 PROCEDURE — 36415 COLL VENOUS BLD VENIPUNCTURE: CPT | Performed by: NURSE PRACTITIONER

## 2019-04-24 PROCEDURE — 99214 OFFICE O/P EST MOD 30 MIN: CPT | Performed by: NURSE PRACTITIONER

## 2019-04-24 PROCEDURE — 84153 ASSAY OF PSA TOTAL: CPT | Performed by: NURSE PRACTITIONER

## 2019-04-24 PROCEDURE — 80061 LIPID PANEL: CPT | Performed by: NURSE PRACTITIONER

## 2019-04-24 PROCEDURE — 80048 BASIC METABOLIC PNL TOTAL CA: CPT | Performed by: NURSE PRACTITIONER

## 2019-04-24 RX ORDER — SIMVASTATIN 10 MG
10 TABLET ORAL AT BEDTIME
Qty: 90 TABLET | Refills: 3 | Status: SHIPPED | OUTPATIENT
Start: 2019-04-24 | End: 2020-04-26

## 2019-04-24 RX ORDER — OMEPRAZOLE 40 MG/1
40 CAPSULE, DELAYED RELEASE ORAL DAILY
Qty: 90 CAPSULE | Refills: 3 | Status: SHIPPED | OUTPATIENT
Start: 2019-04-24 | End: 2020-05-03

## 2019-04-24 RX ORDER — MIRTAZAPINE 30 MG/1
TABLET, FILM COATED ORAL
Qty: 90 TABLET | Refills: 1 | Status: SHIPPED | OUTPATIENT
Start: 2019-04-24 | End: 2019-10-23

## 2019-04-24 ASSESSMENT — PAIN SCALES - GENERAL: PAINLEVEL: NO PAIN (0)

## 2019-04-24 ASSESSMENT — MIFFLIN-ST. JEOR: SCORE: 1334.69

## 2019-04-24 ASSESSMENT — PATIENT HEALTH QUESTIONNAIRE - PHQ9: SUM OF ALL RESPONSES TO PHQ QUESTIONS 1-9: 0

## 2019-04-24 NOTE — LETTER
[facility]  [address] [place] [address]    April 25, 2019    Carlos Faith  [address]   [facility] [address]          Dear Flip Gonzalez cholesterol.   Normal kidney function and electrolytes.   Normal PSA.     If you have any questions please feel free to contact [phone] or myself via [employer].     Enclosed is a copy of your results.     Results for orders placed or performed in visit on 04/24/19   Lipid panel reflex to direct LDL Fasting   Result Value Ref Range    Cholesterol 175 <200 mg/dL    Triglycerides 90 <150 mg/dL    HDL Cholesterol 56 >39 mg/dL    LDL Cholesterol Calculated 101 (H) <100 mg/dL    Non HDL Cholesterol 119 <130 mg/dL   Basic metabolic panel   Result Value Ref Range    Sodium 141 133 - 144 mmol/L    Potassium 4.0 3.4 - 5.3 mmol/L    Chloride 109 94 - 109 mmol/L    Carbon Dioxide 25 20 - 32 mmol/L    Anion Gap 7 3 - 14 mmol/L    Glucose 95 70 - 99 mg/dL    Urea Nitrogen 27 7 - 30 mg/dL    Creatinine 1.05 0.66 - 1.25 mg/dL    GFR Estimate 65 >60 mL/min/[1.73_m2]    GFR Estimate If Black 75 >60 mL/min/[1.73_m2]    Calcium 9.2 8.5 - 10.1 mg/dL   PSA, tumor marker   Result Value Ref Range    PSA <0.01 0 - 4 ug/L       If you have any questions or concerns, please call myself or my nurse at [phone].      Sincerely,        Laurita Gold CNP/ricks

## 2019-04-24 NOTE — LETTER
My Depression Action Plan  Name: Carlos Faith   Date of Birth 1935  Date: 4/24/2019    My doctor: Laurita Gold   My clinic: [address]  [place] [address]  [phone]          GREEN    ZONE   Good Control    What it looks like:     Things are going generally well. You have normal up s and down s. You may even feel depressed from time to time, but bad moods usually last less than a day.   What you need to do:  1. Continue to care for yourself (see self care plan)  2. Check your depression survival kit and update it as needed  3. Follow your physician s recommendations including any medication.  4. Do not stop taking medication unless you consult with your physician first.           YELLOW         ZONE Getting Worse    What it looks like:     Depression is starting to interfere with your life.     It may be hard to get out of bed; you may be starting to isolate yourself from others.    Symptoms of depression are starting to last most all day and this has happened for several days.     You may have suicidal thoughts but they are not constant.   What you need to do:     1. Call your care team, your response to treatment will improve if you keep your care team informed of your progress. Yellow periods are signs an adjustment may need to be made.     2. Continue your self-care, even if you have to fake it!    3. Talk to someone in your support network    4. Open up your depression survival kit           RED    ZONE Medical Alert - Get Help    What it looks like:     Depression is seriously interfering with your life.     You may experience these or other symptoms: You can t get out of bed most days, can t work or engage in other necessary activities, you have trouble taking care of basic hygiene, or basic responsibilities, thoughts of suicide or death that will not go away, self-injurious behavior.     What you need to do:  1. Call your care  team and request a same-day appointment. If they are not available (weekends or after hours) call your local crisis line, emergency room or 911.            Depression Self Care Plan / Survival Kit    Self-Care for Depression  Here s the deal. Your body and mind are really not as separate as most people think.  What you do and think affects how you feel and how you feel influences what you do and think. This means if you do things that people who feel good do, it will help you feel better.  Sometimes this is all it takes.  There is also a place for medication and therapy depending on how severe your depression is, so be sure to consult with your medical provider and/ or Behavioral Health Consultant if your symptoms are worsening or not improving.     In order to better manage my stress, I will:    Exercise  Get some form of exercise, every day. This will help reduce pain and release endorphins, the  feel good  chemicals in your brain. This is almost as good as taking antidepressants!  This is not the same as joining a gym and then never going! (they count on that by the way ) It can be as simple as just going for a walk or doing some gardening, anything that will get you moving.      Hygiene   Maintain good hygiene (Get out of bed in the morning, Make your bed, Brush your teeth, Take a shower, and Get dressed like you were going to work, even if you are unemployed).  If your clothes don't fit try to get ones that do.    Diet  I will strive to eat foods that are good for me, drink plenty of water, and avoid excessive sugar, caffeine, alcohol, and other mood-altering substances.  Some foods that are helpful in depression are: complex carbohydrates, B vitamins, flaxseed, fish or fish oil, fresh fruits and vegetables.    Psychotherapy  I agree to participate in Individual Therapy (if recommended).    Medication  If prescribed medications, I agree to take them.  Missing doses can result in serious side effects.  I  understand that drinking alcohol, or other illicit drug use, may cause potential side effects.  I will not stop my medication abruptly without first discussing it with my provider.    Staying Connected With Others  I will stay in touch with my friends, family members, and my primary care provider/team.    Use your imagination  Be creative.  We all have a creative side; it doesn t matter if it s oil painting, sand castles, or mud pies! This will also kick up the endorphins.    Witness Beauty  (AKA stop and smell the roses) Take a look outside, even in mid-winter. Notice colors, textures. Watch the squirrels and birds.     Service to others  Be of service to others.  There is always someone else in need.  By helping others we can  get out of ourselves  and remember the really important things.  This also provides opportunities for practicing all the other parts of the program.    Humor  Laugh and be silly!  Adjust your TV habits for less news and crime-drama and more comedy.    Control your stress  Try breathing deep, massage therapy, biofeedback, and meditation. Find time to relax each day.     My support system    Clinic Contact:  Phone number:    Contact 1:  Phone number:    Contact 2:  Phone number:    Amish/:  Phone number:    Therapist:  Phone number:    Local crisis center:    Phone number:    Other community support:  Phone number:

## 2019-04-25 NOTE — RESULT ENCOUNTER NOTE
Dear Carlos,    Your recent test results are attached.      Good cholesterol.  Normal kidney function and electrolytes.  Normal PSA.    If you have any questions please feel free to contact [phone] or myself via [employer].    Sincerely,  Laurita Gold, CNP

## 2019-05-09 DIAGNOSIS — E78.2 MIXED HYPERLIPIDEMIA: ICD-10-CM

## 2019-05-09 RX ORDER — SIMVASTATIN 5 MG
10 TABLET ORAL AT BEDTIME
Qty: 60 TABLET | Refills: 0
Start: 2019-05-09

## 2019-05-09 NOTE — TELEPHONE ENCOUNTER
Called and verified with pharmacy on duplicate prescription. Please disregard. LUIS A Alvarez

## 2019-07-03 ENCOUNTER — TELEPHONE (OUTPATIENT)
Dept: FAMILY MEDICINE | Facility: CLINIC | Age: 84
End: 2019-07-03

## 2019-09-05 DIAGNOSIS — F41.1 GAD (GENERALIZED ANXIETY DISORDER): ICD-10-CM

## 2019-09-06 RX ORDER — BUSPIRONE HYDROCHLORIDE 30 MG/1
TABLET ORAL
Qty: 90 TABLET | Refills: 0 | Status: SHIPPED | OUTPATIENT
Start: 2019-09-06 | End: 2019-10-21

## 2019-10-01 ENCOUNTER — TELEPHONE (OUTPATIENT)
Dept: FAMILY MEDICINE | Facility: CLINIC | Age: 84
End: 2019-10-01

## 2019-10-01 DIAGNOSIS — F43.22 ADJUSTMENT DISORDER WITH ANXIOUS MOOD: ICD-10-CM

## 2019-10-01 NOTE — TELEPHONE ENCOUNTER
"Requested Prescriptions   Pending Prescriptions Disp Refills     amitriptyline (ELAVIL) 25 MG tablet [Pharmacy Med Name: AMITRIPTYLINE HCL 25 MG TAB]  DISCONTINUED        Last Written Prescription Date:  3/1/2019  Last Fill Quantity: 90 tablet,   # refills: 1  Last Office Visit: 8/15/2018  w/ KARAN Bagley    Future Office visit:    Next 5 appointments (look out 90 days)    Oct 23, 2019  9:40 AM CDT  SHORT with FLORENTINO Du CNP  [facility] ([place] [id number] [facility] [address]  [phone]           Routing refill request to provider for review/approval because:  Drug not active on patient's medication list   90 tablet 1     Sig: TAKE 1 TABLET (25 MG) BY MOUTH AT BEDTIME       Tricyclic Agents ( Annual appt and no PHQ9) Failed - 10/1/2019  1:34 AM        Failed - Recent (12 mo) or future (30 days) visit within authorizing provider's specialty     Patient has had an office visit with the authorizing provider or a provider within the authorizing providers department within the previous 12 mos or has a future within next 30 days. See \"Patient Info\" tab in inbasket, or \"Choose Columns\" in Meds & Orders section of the refill encounter.              Failed - Medication is active on med list        Passed - Blood Pressure under 140/90 in past 12 mos     BP Readings from Last 3 Encounters:   04/24/19 124/60   08/15/18 138/82   07/16/18 142/82                 Passed - Patient is age 18 or older          "

## 2019-10-03 NOTE — TELEPHONE ENCOUNTER
Patient was last seen by new PCP Laurita Gold CNP at [facility] on 4/24/19, see plan for amitriptyline:      1. Moderate single current episode of major depressive disorder (H)  Patient to continue Remeron, Buspar.  Will have patient wean off amitriptyline due to age.  He is agreeable to this and will notify clinic if mood worsens.    Routed to [facility] refill pool to reach out to patient to see if he requested this refill.    Elma Eng RN  [facility]

## 2019-10-04 NOTE — TELEPHONE ENCOUNTER
This was discontinued.  Please verify if he is taking this medication.    Thanks,  Laurita Gold, CNP

## 2019-10-07 NOTE — TELEPHONE ENCOUNTER
"Spoke with patient  He refused to verify his  at first and demanded to know which med we were calling about first  He eventually agreed to verify his   He stated that he stopped the amitriptyline for 30 days but was getting up 2-3 times a night to void so he went back on the med  He stated that he does not need a refill at this time and has a f/u with Laurita Gold NP on 10/23/19 and will discuss further then  Explained to patient that amitriptyline was prescribed for his depression and anxious mood but patient stated that it helped his urinary frequency so he continued  Attempted to explain again but patient insisted on continuing for his urinary symptoms   Patient stated, \"well it helped and I am going to be seeing her anyway\"    Please advise if you would like this added onto epic as reported med?    Jania Santamaria RN    "

## 2019-10-21 DIAGNOSIS — F41.1 GAD (GENERALIZED ANXIETY DISORDER): ICD-10-CM

## 2019-10-22 DIAGNOSIS — F43.22 ADJUSTMENT DISORDER WITH ANXIOUS MOOD: ICD-10-CM

## 2019-10-22 RX ORDER — BUSPIRONE HYDROCHLORIDE 30 MG/1
TABLET ORAL
Qty: 90 TABLET | Refills: 0 | Status: SHIPPED | OUTPATIENT
Start: 2019-10-22 | End: 2019-10-23

## 2019-10-22 NOTE — TELEPHONE ENCOUNTER
"Requested Prescriptions   Pending Prescriptions Disp Refills     amitriptyline (ELAVIL) 25 MG tablet [Pharmacy Med Name: AMITRIPTYLINE HCL 25 MG TAB]  Last Written Prescription Date:  10/8/2019  Last Fill Quantity: ,  # refills:    Last office visit: 4/24/2019 with prescribing provider:  Jaspreet Leblanc  Future Office Visit:   Next 5 appointments (look out 90 days)    Oct 23, 2019  9:40 AM CDT  SHORT with Laurita Gold, APRN CNP  [facility] ([facility] [address] [facility] [address]  [phone]        90 tablet 1     Sig: TAKE 1 TABLET (25 MG) BY MOUTH AT BEDTIME       Tricyclic Agents ( Annual appt and no PHQ9) Failed - 10/22/2019  9:11 AM        Failed - Recent (12 mo) or future (30 days) visit within authorizing provider's specialty     Patient has had an office visit with the authorizing provider or a provider within the authorizing providers department within the previous 12 mos or has a future within next 30 days. See \"Patient Info\" tab in inbasket, or \"Choose Columns\" in Meds & Orders section of the refill encounter.              Passed - Blood Pressure under 140/90 in past 12 mos     BP Readings from Last 3 Encounters:   04/24/19 124/60   08/15/18 138/82   07/16/18 142/82                 Passed - Medication is active on med list        Passed - Patient is age 18 or older         "

## 2019-10-23 ENCOUNTER — OFFICE VISIT (OUTPATIENT)
Dept: FAMILY MEDICINE | Facility: CLINIC | Age: 84
End: 2019-10-23
Payer: MEDICARE

## 2019-10-23 VITALS
BODY MASS INDEX: 21.33 KG/M2 | WEIGHT: 144 LBS | HEIGHT: 69 IN | SYSTOLIC BLOOD PRESSURE: 118 MMHG | HEART RATE: 100 BPM | DIASTOLIC BLOOD PRESSURE: 76 MMHG | RESPIRATION RATE: 20 BRPM | TEMPERATURE: 97.3 F | OXYGEN SATURATION: 97 %

## 2019-10-23 DIAGNOSIS — N32.81 OVERACTIVE BLADDER: ICD-10-CM

## 2019-10-23 DIAGNOSIS — F41.1 GAD (GENERALIZED ANXIETY DISORDER): Primary | ICD-10-CM

## 2019-10-23 DIAGNOSIS — Z71.89 ADVANCED DIRECTIVES, COUNSELING/DISCUSSION: ICD-10-CM

## 2019-10-23 PROCEDURE — 99214 OFFICE O/P EST MOD 30 MIN: CPT | Performed by: NURSE PRACTITIONER

## 2019-10-23 RX ORDER — BUSPIRONE HYDROCHLORIDE 30 MG/1
TABLET ORAL
Qty: 90 TABLET | Refills: 1 | Status: SHIPPED | OUTPATIENT
Start: 2019-10-23 | End: 2020-07-07

## 2019-10-23 RX ORDER — MIRTAZAPINE 30 MG/1
TABLET, FILM COATED ORAL
Qty: 90 TABLET | Refills: 1 | Status: SHIPPED | OUTPATIENT
Start: 2019-10-23 | End: 2020-05-27

## 2019-10-23 RX ORDER — OXYBUTYNIN CHLORIDE 5 MG/1
5 TABLET, EXTENDED RELEASE ORAL DAILY
Qty: 30 TABLET | Refills: 0 | Status: SHIPPED | OUTPATIENT
Start: 2019-10-23 | End: 2019-11-01 | Stop reason: SINTOL

## 2019-10-23 ASSESSMENT — MIFFLIN-ST. JEOR: SCORE: 1325.62

## 2019-10-23 ASSESSMENT — PAIN SCALES - GENERAL: PAINLEVEL: NO PAIN (0)

## 2019-10-23 ASSESSMENT — PATIENT HEALTH QUESTIONNAIRE - PHQ9: SUM OF ALL RESPONSES TO PHQ QUESTIONS 1-9: 0

## 2019-10-23 NOTE — PROGRESS NOTES
Subjective     Carlos Faith is a 84 year old male who presents to clinic today for the following health issues:    HPI   Medication Followup of amitriptyline 25 mg    Taking Medication as prescribed: still taking    Side Effects:  Dry mouth    Medication Helping Symptoms:  yes     Patient stopped medication, but restarted it due to getting up to go to the bathroom several times per night.  He notes his mood was stable at that time.  He has never been on meds for overactive bladder at night.  He notes that he also has symptoms during the day.  He has a history of prostate cancer and urinary incontinence.    He notes that his mood is table.  He denies thoughts of suicide or self harm.  He continues on mirtazapine and buspirone.  He denies any side effects.        Patient Active Problem List   Diagnosis     H/O prostate cancer     History of penile implant     Mixed hyperlipidemia     Gastroesophageal reflux disease without esophagitis     Moderate single current episode of major depressive disorder (H)     KANDI (generalized anxiety disorder)     Past Surgical History:   Procedure Laterality Date      penile implant  1994     CATARACT IOL, RT/LT       CYSTOSCOPY, DILATE URETHRA, COMBINED N/A 6/1/2017    Procedure: COMBINED CYSTOSCOPY, DILATE URETHRA;  Cysto, urethral ballon dilation and Holmium laser Lithotripsy;  Surgeon: Juanito Vaughan MD;  Location: [facility] OR     HERNIA REPAIR, INGUINAL RT/LT       PROSTATE SURGERY  1994    Prostatectomy       Social History     Tobacco Use     Smoking status: Never Smoker     Smokeless tobacco: Never Used   Substance Use Topics     Alcohol use: Yes     Alcohol/week: 0.0 standard drinks     Comment: Wine     Family History   Problem Relation Age of Onset     Coronary Artery Disease Father         83 MI     Prostate Cancer Paternal Grandfather          Current Outpatient Medications   Medication Sig Dispense Refill     busPIRone HCl (BUSPAR) 30 MG tablet TAKE 1/2 TABLET (15  "MG) BY MOUTH 2 TIMES DAILY 90 tablet 1     glucosamine-chondroitin 500-400 MG CAPS Take 1 capsule by mouth daily       mirtazapine (REMERON) 30 MG tablet TAKE 1 TABLET (30 MG) BY MOUTH AT BEDTIME 90 tablet 1     multivitamin, therapeutic with minerals (MULTI-VITAMIN) TABS tablet Take 1 tablet by mouth daily       omeprazole (PRILOSEC) 40 MG DR capsule Take 1 capsule (40 mg) by mouth daily 90 capsule 3     oxybutynin ER (DITROPAN-XL) 5 MG 24 hr tablet Take 1 tablet (5 mg) by mouth daily 30 tablet 0     simvastatin (ZOCOR) 10 MG tablet Take 1 tablet (10 mg) by mouth At Bedtime 90 tablet 3     SLO-NIACIN PO        TURMERIC PO Take by mouth daily       latanoprost (XALATAN) 0.005 % ophthalmic solution INSTILL 1 DROP INTO BOTH EYES EVERY EVENING AS DIRECTED  1     Allergies   Allergen Reactions     Sulfa Drugs Rash     BP Readings from Last 3 Encounters:   10/23/19 118/76   04/24/19 124/60   08/15/18 138/82    Wt Readings from Last 3 Encounters:   10/23/19 65.3 kg (144 lb)   04/24/19 66.2 kg (146 lb)   08/15/18 66.7 kg (147 lb)                    Reviewed and updated as needed this visit by Provider  Tobacco  Allergies  Meds  Problems  Med Hx  Surg Hx  Fam Hx         Review of Systems   ROS COMP: Constitutional, HEENT, cardiovascular, pulmonary, gi and gu systems are negative, except as otherwise noted.      Objective    /76   Pulse 100   Temp 97.3  F (36.3  C) (Oral)   Resp 20   Ht 1.74 m (5' 8.5\")   Wt 65.3 kg (144 lb)   SpO2 97%   BMI 21.58 kg/m    Body mass index is 21.58 kg/m .  Physical Exam   GENERAL: healthy, alert and no distress  RESP: lungs clear to auscultation - no rales, rhonchi or wheezes  CV: regular rate and rhythm, normal S1 S2, no S3 or S4, no murmur, click or rub, no peripheral edema and peripheral pulses strong  MS: no gross musculoskeletal defects noted, no edema    Diagnostic Test Results:  none         Assessment & Plan     1. KANDI (generalized anxiety disorder)  Stable.  " Continue current treatment plan and medications.   - mirtazapine (REMERON) 30 MG tablet; TAKE 1 TABLET (30 MG) BY MOUTH AT BEDTIME  Dispense: 90 tablet; Refill: 1  - busPIRone HCl (BUSPAR) 30 MG tablet; TAKE 1/2 TABLET (15 MG) BY MOUTH 2 TIMES DAILY  Dispense: 90 tablet; Refill: 1    2. Advanced directives, counseling/discussion  Patient to bring in copy.    3. Overactive bladder  Patient to stop amitriptyline due to concerns for drug interaction, over sedation.  Patient to try low dose oxybutynin.  I did tell him that it is consider normal to get up 1-2 times per night to urinate.  Patient to update prior to refill.  - oxybutynin ER (DITROPAN-XL) 5 MG 24 hr tablet; Take 1 tablet (5 mg) by mouth daily  Dispense: 30 tablet; Refill: 0           Return in about 6 months (around 4/23/2020) for Physical Exam, Lab Work.    FLORENTINO R[facility]ford [facility]

## 2019-10-25 NOTE — TELEPHONE ENCOUNTER
"I see amitriptyline was removed from active med list at recent visit with Laurita Gold 10/23/19.    Due for follow up in April.       \"Refusal\" routed back to pharmacy with note.   The refill request came in before the appointment and new plan.barbi Eng RN  [facility]      "

## 2019-10-31 ENCOUNTER — TELEPHONE (OUTPATIENT)
Dept: FAMILY MEDICINE | Facility: CLINIC | Age: 84
End: 2019-10-31

## 2019-10-31 DIAGNOSIS — N32.81 OVERACTIVE BLADDER: Primary | ICD-10-CM

## 2019-10-31 NOTE — TELEPHONE ENCOUNTER
Spoke with pt. States he is experiencing a very dry mouth from the oxybutynin ER. Can he switch to oxybutynin IR BID?    Lacy De La Torre RN  [facility]

## 2019-10-31 NOTE — TELEPHONE ENCOUNTER
Received fax from pharmacy stating that patient called saying that the medication for oxybutynin ER (DITROPAN-XL) 5 MG 24 hr tablet is giving him a lot of side effects. Please consider changing dose to oxybutynin Ir 5mg BID

## 2019-11-01 RX ORDER — OXYBUTYNIN CHLORIDE 5 MG/1
5 TABLET ORAL 2 TIMES DAILY
Qty: 60 TABLET | Refills: 1 | Status: SHIPPED | OUTPATIENT
Start: 2019-11-01 | End: 2020-02-17

## 2019-11-01 NOTE — TELEPHONE ENCOUNTER
Yes, he may try oxybutynin IR 5 mg BID to see if this improves side effects.  (#60, 1 RF, indication overactive bladder).    Laurita Gold, CNP

## 2019-11-01 NOTE — TELEPHONE ENCOUNTER
Called and spoke with patient & notified of provider's information as written.   Verbalizes understanding & no further questions.   Rx sent to pharmacy.     Madeleine Travis RN

## 2019-11-26 DIAGNOSIS — N32.81 OVERACTIVE BLADDER: ICD-10-CM

## 2019-11-26 NOTE — TELEPHONE ENCOUNTER
duplicate     Disp Refills Start End KUSHAL   oxybutynin (DITROPAN) 5 MG tablet 60 tablet 1 11/1/2019  --   Sig - Route: Take 1 tablet (5 mg) by mouth 2 times daily - Oral   Sent to pharmacy as: oxybutynin (DITROPAN) 5 MG tablet   Class: E-Prescribe   Notes to Pharmacy: Discontinue oxybutynin ER due to side effects   Order: [id number]   E-Prescribing Status: Receipt confirmed by pharmacy (11/1/2019  8:52 AM CDT)

## 2019-11-27 RX ORDER — OXYBUTYNIN CHLORIDE 5 MG/1
TABLET ORAL
Qty: 60 TABLET | Refills: 1 | OUTPATIENT
Start: 2019-11-27

## 2019-12-17 DIAGNOSIS — F43.22 ADJUSTMENT DISORDER WITH ANXIOUS MOOD: ICD-10-CM

## 2019-12-17 NOTE — TELEPHONE ENCOUNTER
"Requested Prescriptions   Pending Prescriptions Disp Refills     amitriptyline (ELAVIL) 25 MG tablet [Pharmacy Med Name: AMITRIPTYLINE HCL 25 MG TAB]  Last Written Prescription Date:  10/8/2019  Last Fill Quantity: ,  # refills:    Last office visit: 10/23/2019 with prescribing provider:  Kevyn   Future Office Visit:   90 tablet 1     Sig: TAKE 1 TABLET (25 MG) BY MOUTH AT BEDTIME       Tricyclic Agents ( Annual appt and no PHQ9) Failed - 12/17/2019  9:24 AM        Failed - Recent (12 mo) or future (30 days) visit within authorizing provider's specialty     Patient has had an office visit with the authorizing provider or a provider within the authorizing providers department within the previous 12 mos or has a future within next 30 days. See \"Patient Info\" tab in inbasket, or \"Choose Columns\" in Meds & Orders section of the refill encounter.              Failed - Medication is active on med list        Passed - Blood Pressure under 140/90 in past 12 mos     BP Readings from Last 3 Encounters:   10/23/19 118/76   04/24/19 124/60   08/15/18 138/82                 Passed - Patient is age 18 or older         "

## 2019-12-18 NOTE — TELEPHONE ENCOUNTER
OV notes from 10/23/19    3. Overactive bladder  Patient to stop amitriptyline due to concerns for drug interaction, over sedation.  Patient to try low dose oxybutynin.  I did tell him that it is consider normal to get up 1-2 times per night to urinate.  Patient to update prior to refill.  - oxybutynin ER (DITROPAN-XL) 5 MG 24 hr tablet; Take 1 tablet (5 mg) by mouth daily  Dispense: 30 tablet; Refill: 0    Note to inform pharmacy.       Miley Le RN

## 2020-01-13 ENCOUNTER — ALLIED HEALTH/NURSE VISIT (OUTPATIENT)
Dept: CARDIOLOGY | Facility: CLINIC | Age: 85
End: 2020-01-13
Payer: MEDICARE

## 2020-01-13 ENCOUNTER — TRANSFERRED RECORDS (OUTPATIENT)
Dept: HEALTH INFORMATION MANAGEMENT | Facility: CLINIC | Age: 85
End: 2020-01-13

## 2020-01-13 VITALS — DIASTOLIC BLOOD PRESSURE: 76 MMHG | OXYGEN SATURATION: 98 % | HEART RATE: 103 BPM | SYSTOLIC BLOOD PRESSURE: 163 MMHG

## 2020-01-13 DIAGNOSIS — R07.9 CHEST PAIN: Primary | ICD-10-CM

## 2020-01-13 PROCEDURE — 93000 ELECTROCARDIOGRAM COMPLETE: CPT | Performed by: NURSE PRACTITIONER

## 2020-01-13 NOTE — NURSING NOTE
Ekg. Test procedure explained to patient .Ekg.test performed today per Provider order.Then Ekg. Result relayed  to RN. And  provider for review.  Rita Arellano L.P.N.

## 2020-01-13 NOTE — PROGRESS NOTES
Nurse visit. Patient walked in to clinic as he had a history of some CP prior to today.   RN requested EKG as patient had a hx of CP. Patient stated to nurse he has no CP now. EKG done - changes noted from last EKG done in 2017. No SOB, NO CP, No lightheaded/dizziness per nurse assessment.  With high BP, tachycardia (not new)  and changes on EKG recommended patient to go to ER. He states his wife will take him.      Traci GOOD

## 2020-01-13 NOTE — NURSING NOTE
"Patient is a walk in un-established patient reporting that he thinks he had a heart attack over the weekend and wants to be seen.      Writer added patient to nurse only appointment.  Writer assessed patient whom states that this past Friday evening at6 pm he developed left sided chest pain.  Pain rate 5/10 (0=no Pain, 10=severe pain).  Reports the chest pain occurs at rest and does not worsen with exertion.  Also reports that the pain does not radiate to chest, neck, jaw or arm.  The pain \"tapered off\" around 10 pm.  States then Saturday and Sunday he was symptom free but then he woke up today with chest pain at 4 am and it continued till 6-7 am.  States his chest pain is intermittent and his chest is tender to the touch on his left side.  His pain rate is 2/10 this morning.  He is currently denying chest pain.      Patient denies any shortness of breath.  Is very active.  Played [employer] ball on Friday and had no symptoms of chest pain or shortness of breath.  He did take an aspirin today but he is uncertain of dose.  States \"wife has issues\" but would not go into detail regarding her issues at this time.  States he does not have any history or coronary artery disease.    Denies any vision changes or disturbances, confusion, balance issues.  Is talking in full complete understandable sentences.    Vitals completed:  /76  bpm  O2 98%    Reviewed with Traci Chen.  Per Traci, order EKG.  EKG completed and reviewed.  Sinus Rhythm with PVC's.  Per Traci, patient to go to the ED to be evaluated.      Writer advised patient to go directly to the ED.  Patient currently denying chest pain or shortness of breath.  Patient refusing to go directly to ED.  Patient states he is going home first then having his wife drive him to the [facility] emergency room.  Advised patient that if chest pain returns to call 911.  Patient verbalized understanding.    Pricilla Steiner RN  Cardiology Care Coordinator  [facility] " [facility]  [phone] option 1

## 2020-02-04 ENCOUNTER — TRANSFERRED RECORDS (OUTPATIENT)
Dept: HEALTH INFORMATION MANAGEMENT | Facility: CLINIC | Age: 85
End: 2020-02-04

## 2020-02-17 ENCOUNTER — OFFICE VISIT (OUTPATIENT)
Dept: FAMILY MEDICINE | Facility: CLINIC | Age: 85
End: 2020-02-17
Payer: MEDICARE

## 2020-02-17 VITALS
BODY MASS INDEX: 20.83 KG/M2 | TEMPERATURE: 98.5 F | SYSTOLIC BLOOD PRESSURE: 128 MMHG | DIASTOLIC BLOOD PRESSURE: 78 MMHG | WEIGHT: 139 LBS | OXYGEN SATURATION: 97 % | HEART RATE: 91 BPM

## 2020-02-17 DIAGNOSIS — K21.9 GASTROESOPHAGEAL REFLUX DISEASE WITHOUT ESOPHAGITIS: ICD-10-CM

## 2020-02-17 DIAGNOSIS — Z85.46 H/O PROSTATE CANCER: ICD-10-CM

## 2020-02-17 DIAGNOSIS — E78.2 MIXED HYPERLIPIDEMIA: ICD-10-CM

## 2020-02-17 DIAGNOSIS — R91.8 PULMONARY NODULES: Primary | ICD-10-CM

## 2020-02-17 DIAGNOSIS — Z09 HOSPITAL DISCHARGE FOLLOW-UP: ICD-10-CM

## 2020-02-17 PROCEDURE — 99214 OFFICE O/P EST MOD 30 MIN: CPT | Performed by: FAMILY MEDICINE

## 2020-02-17 NOTE — PROGRESS NOTES
Subjective     Carlos Faith is a 85 year old male who presents to clinic today for the following health issues:    ER visit from 1/13/2020  ANGIOGRAM CHEST: Pulmonary arteries are normal caliber and negative for  pulmonary emboli. Thoracic aorta is negative for dissection. No CT evidence of  right heart strain.    LUNGS AND PLEURA: Fat-containing right diaphragmatic hernia. 1.7 x 1.6 cm  cavitary lesion in the lingula (4, 118). Bilateral dependent atelectasis.    MEDIASTINUM/AXILLAE: Moderately enlarged heart. No pericardial effusion.  Atherosclerotic calcifications of the aorta and coronary arteries. Small hiatal  hernia.    UPPER ABDOMEN: Normal.    MUSCULOSKELETAL: Diffuse osteopenia.    IMPRESSION:  1. No pulmonary embolus, aortic dissection, or aneurysm.  2. 1.7 x 1.6 cm peripheral cavitary focus of consolidation within the lingula,  diagnostic considerations include infection, underlying inflammatory etiology  and/or pulmonary malignancy.  3. Moderately enlarged heart with coronary artery disease and atherosclerotic  vascular disease.  4. Fat-containing right [place] diaphragmatic hernia.  5. Small hiatal hernia.      HPI     Health maintenance   Shingles, pneumonia, and flu vaccine up to date   Patient reports a history of prostate cancer.    ED/UC Followup:    Facility:  [facility]  Date of visit: 01/13/2020  Reason for visit: Heavy Chest Pain, Follow up on Lung Nodule/Mass. Was on Augmentin Twice a day for a week.  Current Status:    He had some chest pain, that went on for about 4 hours a few days ago. He says after chest pain symptoms, he felt like he had a heart attack so they went to the ER. Went to [facility] had imaging,  they found no blockage in the heart, but the found a nodule in his lung. He was placed on a Augmentin for 7 days. He reports that he had diarrheas while on antibiotic. He is due to recheck lungs. He denies any symptoms of cough, congestion, fever, or chest  discomfort.He exercises 3x weekly and reports that he does well with activity. He denies any fatigue.     Heartburn  He has hiatal hernia and he takes omeprazole for this. He tried taking 20 mg didn't help, moved up to 40 mg. He reports that he is breathing better.         Current Outpatient Medications   Medication Sig Dispense Refill     busPIRone HCl (BUSPAR) 30 MG tablet TAKE 1/2 TABLET (15 MG) BY MOUTH 2 TIMES DAILY 90 tablet 1     glucosamine-chondroitin 500-400 MG CAPS Take 1 capsule by mouth daily       mirtazapine (REMERON) 30 MG tablet TAKE 1 TABLET (30 MG) BY MOUTH AT BEDTIME 90 tablet 1     multivitamin, therapeutic with minerals (MULTI-VITAMIN) TABS tablet Take 1 tablet by mouth daily       omeprazole (PRILOSEC) 40 MG DR capsule Take 1 capsule (40 mg) by mouth daily 90 capsule 3     simvastatin (ZOCOR) 10 MG tablet Take 1 tablet (10 mg) by mouth At Bedtime 90 tablet 3     SLO-NIACIN PO        TURMERIC PO Take by mouth daily       BP Readings from Last 3 Encounters:   02/17/20 128/78   01/13/20 (!) 163/76   10/23/19 118/76    Wt Readings from Last 3 Encounters:   02/17/20 63 kg (139 lb)   10/23/19 65.3 kg (144 lb)   04/24/19 66.2 kg (146 lb)                    Reviewed and updated as needed this visit by Provider         Review of Systems   ROS COMP: Constitutional, HEENT, cardiovascular, pulmonary, GI, , musculoskeletal, neuro, skin, endocrine and psych systems are negative, except as otherwise noted.      This document serves as a record of the services and decisions personally performed and made by Antoinette Bagley DO. It was created on her behalf by Britney Enamorado, a trained medical scribe. The creation of this document is based on the provider's statements to the medical scribe.  Britney Enamorado 1:12 PM February 17, 2020    Objective    /78 (Patient Position: Sitting, Cuff Size: Adult Regular)   Pulse 91   Temp 98.5  F (36.9  C) (Oral)   Wt 63 kg (139 lb)   SpO2 97%   BMI 20.83 kg/m     Body mass index is 20.83 kg/m .  Physical Exam   GENERAL: healthy, alert and no distress  EYES: Eyes grossly normal to inspection, PERRL and conjunctivae and sclerae normal  HENT: ear canals and TM's normal, nose and mouth without ulcers or lesions  NECK: no adenopathy, no asymmetry, masses, or scars and thyroid normal to palpation  RESP: lungs clear to auscultation - no rales, rhonchi or wheezes  CV: regular rate and rhythm, normal S1 S2, no S3 or S4, no murmur, click or rub, no peripheral edema and peripheral pulses strong  ABDOMEN: soft, nontender, no hepatosplenomegaly, no masses and bowel sounds normal  MS: no gross musculoskeletal defects noted, no edema  SKIN: no suspicious lesions or rashes  NEURO: Normal strength and tone, mentation intact and speech normal  PSYCH: mentation appears normal, affect normal/bright    Diagnostic Test Results:  Labs reviewed in Epic  No results found for this or any previous visit (from the past 24 hour(s)).        Assessment & Plan     ICD-10-CM    1. Pulmonary nodules R91.8 Oncology/Hematology Adult Referral   2. H/O prostate cancer Z85.46    3. Gastroesophageal reflux disease without esophagitis K21.9    4. Mixed hyperlipidemia E78.2    5. Hospital discharge follow-up Z09        1. Pulmonary nodules-discovered in ED, most likley infectious , treated for pneumonia, no symptoms now, resolved cough, chest pressure, advised follow up with lung nodule clinic due to his history of prostate cancer(per his wife's preference) instead of us ordering CT in 3 months here to recheck  Follow-up with pulmonary clinic in March to have lung nodule rechecked.   - Oncology/Hematology Adult Referral; Future     2. H/O prostate cancer  PSA was normal at last visit, currently asymptomatic     3. Gastroesophageal reflux disease without esophagitis  Well manged on current dose of Omeprazole.    4. Mixed hyperlipidemia  LDL was 101 when checked 4/24/2019. He has since being physically active at  least 3x a week and plans to continue implementing lifestyle changes.       See Patient Instructions    Return in about 3 months (around 5/17/2020) for Physical Exam.    The information in this document, created by the medical scribe for me, accurately reflects the services I personally performed and the decisions made by me. I have reviewed and approved this document for accuracy prior to leaving the patient care area.  February 17, 2020 1:26 PM    Antoinette Bagley DO  [facility]

## 2020-02-18 ENCOUNTER — TELEPHONE (OUTPATIENT)
Dept: PULMONOLOGY | Facility: CLINIC | Age: 85
End: 2020-02-18

## 2020-02-18 NOTE — TELEPHONE ENCOUNTER
I called Carlos to schedule a new patient appt for dx pulmonary nodules in the Interventional Pulmonary Nodule Clinic per [facility] order. Carlos did not answer, so I left a voicemail requesting a return call to schedule.

## 2020-02-21 ENCOUNTER — TELEPHONE (OUTPATIENT)
Dept: FAMILY MEDICINE | Facility: CLINIC | Age: 85
End: 2020-02-21

## 2020-02-21 NOTE — TELEPHONE ENCOUNTER
Reason for Call: Request for an order or referral:    Order or referral being requested: CT orders    Date needed: as soon as possible    Has the patient been seen by the PCP for this problem? YES    Additional comments: Patient stated he saw pcp and was told by pcp that CT orders would be made. Patient has been waiting over a week and has not heard back for CT scheduling. No CT order on file. Patient requesting for nurse to call, please advise.     Phone number Patient can be reached at:  Home number on file [phone] (home)    Best Time:  Anytime    Can we leave a detailed message on this number?  YES    Call taken on 2/21/2020 at 3:27 PM by Miryam Arreaga

## 2020-02-24 NOTE — TELEPHONE ENCOUNTER
Patient/family was instructed to return call to [facility], directly on the RN Call back line at [phone].    OV Notes:  1. Pulmonary nodules-discovered in ED, most likley infectious , treated for pneumonia, no symptoms now, resolved cough, chest pressure, advised follow up with lung nodule clinic due to his history of prostate cancer(per his wife's preference) instead of us ordering CT in 3 months here to recheck  Follow-up with pulmonary clinic in March to have lung nodule rechecked.   - Oncology/Hematology Adult Referral; Future    Miley Le RN

## 2020-02-25 NOTE — TELEPHONE ENCOUNTER
Attempt #2 to call patient.     Patient did not answer, RN left voicemail at home number. RN requested patient return call to Nurse Triage line at [phone]. '      OV Notes:  1. Pulmonary nodules-discovered in ED, most likley infectious , treated for pneumonia, no symptoms now, resolved cough, chest pressure, advised follow up with lung nodule clinic due to his history of prostate cancer(per his wife's preference) instead of us ordering CT in 3 months here to recheck  Follow-up with pulmonary clinic in March to have lung nodule rechecked.   - Oncology/Hematology Adult Referral; Future    Bella Paula, RN, BSN, PHN  [facility]: [place]

## 2020-02-26 NOTE — TELEPHONE ENCOUNTER
Patient returned call to RN line. I called him back but left a message asking him to call back and asked if we could leave a detailed message on his voicemail.  Debbi Power RN

## 2020-02-27 ENCOUNTER — TELEPHONE (OUTPATIENT)
Dept: FAMILY MEDICINE | Facility: CLINIC | Age: 85
End: 2020-02-27
Payer: MEDICARE

## 2020-02-27 DIAGNOSIS — R91.8 PULMONARY NODULES: Primary | ICD-10-CM

## 2020-02-27 NOTE — TELEPHONE ENCOUNTER
Route to Dr. Bagley - would you like to order CT to follow up on lung nodules? Sounds like he is agreeable now.  And would patient also still need follow up with pulmonary clinic?    Patient called RN voicemail and left message specifically stating that he would like to have the CT done at the [facility] on a Wednesday around 1:00 pm in late March or Early April.    Once order placed, will flag for TC to contact patient to schedule.    Anshul Cage RN

## 2020-02-27 NOTE — TELEPHONE ENCOUNTER
Reason for Call:  Other appointment and call back    Detailed comments: Patient states he was told by Dr. Bagley to schedule an appt for a CT and everytime he has called the number to try to schedule the appt he was told they do not schedule the appts. Patient would like a call back to see if someone can help him get this scheduled or to get clarification on what he should be doing to get this set up. Please call patient to discuss.     Phone Number Patient can be reached at: Home number on file [phone] (home)    Best Time: any     Can we leave a detailed message on this number? YES    Call taken on 2/27/2020 at 3:30 PM by Zainab Shaw

## 2020-02-28 NOTE — TELEPHONE ENCOUNTER
We talked about it in the office and was advised to do lung nodule clinic due to his history of prostate CA.  I can order CT but if abnormal again , may need to see them any way.  He should do one in April .  I can order it if he wants me to as well.  Antoinette Bagley D.O.

## 2020-02-28 NOTE — TELEPHONE ENCOUNTER
Routing to PCP to please advise.      Patient wants a CT scan done and is trying to schedule.   Unsure Chest CT which to pend      Laurita Carver RN

## 2020-03-25 ENCOUNTER — TELEPHONE (OUTPATIENT)
Dept: FAMILY MEDICINE | Facility: CLINIC | Age: 85
End: 2020-03-25

## 2020-03-25 ENCOUNTER — ANCILLARY PROCEDURE (OUTPATIENT)
Dept: CT IMAGING | Facility: CLINIC | Age: 85
End: 2020-03-25
Attending: FAMILY MEDICINE
Payer: MEDICARE

## 2020-03-25 DIAGNOSIS — R91.8 PULMONARY NODULES: Primary | ICD-10-CM

## 2020-03-25 DIAGNOSIS — R91.8 PULMONARY NODULES: ICD-10-CM

## 2020-03-25 LAB
CREAT BLD-MCNC: 1.2 MG/DL (ref 0.66–1.25)
GFR SERPL CREATININE-BSD FRML MDRD: 58 ML/MIN/{1.73_M2}
RADIOLOGIST FLAGS: NORMAL

## 2020-03-25 RX ORDER — IOPAMIDOL 755 MG/ML
68 INJECTION, SOLUTION INTRAVASCULAR ONCE
Status: COMPLETED | OUTPATIENT
Start: 2020-03-25 | End: 2020-03-25

## 2020-03-25 RX ADMIN — IOPAMIDOL 68 ML: 755 INJECTION, SOLUTION INTRAVASCULAR at 12:39

## 2020-03-25 NOTE — TELEPHONE ENCOUNTER
Routing incidental finding to provider.  Delmar from [facility] called stating the CT patient had today showed lung nodule-consider f/u.     Lindsay Bear RN

## 2020-03-25 NOTE — TELEPHONE ENCOUNTER
Called patient to discuss results    Showing nodule that needs to be followed  And hernia , and cad as well.  No symptoms now  He will follow up in clinic in 6 months as advised  Antoinette Bagley D.O.

## 2020-05-03 DIAGNOSIS — K21.00 GASTROESOPHAGEAL REFLUX DISEASE WITH ESOPHAGITIS: ICD-10-CM

## 2020-05-03 RX ORDER — OMEPRAZOLE 40 MG/1
CAPSULE, DELAYED RELEASE ORAL
Qty: 90 CAPSULE | Refills: 2 | Status: SHIPPED | OUTPATIENT
Start: 2020-05-03 | End: 2021-02-08

## 2020-05-27 ENCOUNTER — NURSE TRIAGE (OUTPATIENT)
Dept: FAMILY MEDICINE | Facility: CLINIC | Age: 85
End: 2020-05-27

## 2020-05-27 DIAGNOSIS — F41.1 GAD (GENERALIZED ANXIETY DISORDER): ICD-10-CM

## 2020-05-27 RX ORDER — MIRTAZAPINE 45 MG/1
TABLET, FILM COATED ORAL
Qty: 90 TABLET | Refills: 0 | Status: SHIPPED | OUTPATIENT
Start: 2020-05-27 | End: 2020-07-22

## 2020-05-27 NOTE — TELEPHONE ENCOUNTER
Called and spoke with patient and gave information. Verbalized understanding and is OK with trying Remeron 45 mg at bedtime. Rx sent to pharmacy and advised him to schedule appointment. He agreed.     Madeleine Travis RN

## 2020-05-27 NOTE — TELEPHONE ENCOUNTER
Called patient. He is requesting to re-start amitriptyline 25 mg at bedtime. He state that he has a history of prostate cancer, he had stopped amitriptyline due to concerns for drug interaction and started oxybutynin (10/23/2019 OV notes). Pt states that he was on oxybutynin for about two months. He is not taking. Currently he is getting up to urinate in the middle of the night (around 4). Pt states that he then cannot fall back asleep. States that when he was on amitriptyline, he would sleep till 7 am and was not getting up during the night to use the bathroom.

## 2020-05-27 NOTE — TELEPHONE ENCOUNTER
Reason for call:  Patient reporting a symptom    Symptom or request: Urinary retention    Duration (how long have symptoms been present): 3 months    Have you been treated for this before? Yes    Additional comments:     Phone Number patient can be reached at:  Home number on file [phone] (home)    Best Time:  any    Can we leave a detailed message on this number:  YES    Call taken on 5/27/2020 at 2:29 PM by Hannah Hou

## 2020-05-27 NOTE — TELEPHONE ENCOUNTER
Amitriptyline is not advised for continued use in patient's over the age of 65 due to potential side effects.  I do no think that this medication is safe for him.  It is natural for people of any age to get up in the middle of the night to go to the bathroom, as least once or twice.  We could adjust his remeron dose if he is having difficulty sleeping to 45 mg at bedtime to see if that might help.  If he would like to discuss further, please set up phone visit and if he chooses to start mirtazapine 45 mg at bedtime, please set up phone visit to review in 3-4 weeks.    Thanks,  Laurita Gold, CNP

## 2020-06-20 DIAGNOSIS — F41.1 GAD (GENERALIZED ANXIETY DISORDER): Primary | ICD-10-CM

## 2020-06-22 RX ORDER — MIRTAZAPINE 30 MG/1
TABLET, FILM COATED ORAL
Qty: 90 TABLET | Refills: 0 | Status: SHIPPED | OUTPATIENT
Start: 2020-06-22 | End: 2020-07-07

## 2020-06-22 NOTE — TELEPHONE ENCOUNTER
Duplicate     Disp  Refills  Start  End  KUSHAL    mirtazapine (REMERON) 45 MG tablet  90 tablet  0  5/27/2020   No    Sig: TAKE 1 TABLET (30 MG) BY MOUTH AT BEDTIME    Sent to pharmacy as: Mirtazapine 45 MG Oral Tablet (REMERON)    Class: E-Prescribe    Order: [id number]    E-Prescribing Status: Receipt confirmed by pharmacy (5/27/2020  4:01 PM CDT)

## 2020-07-03 NOTE — PROGRESS NOTES
Subjective     Carlos Faith is a 85 year old male who presents to clinic today for the following health issues:    HPI     Depression and Anxiety Follow-Up    How are you doing with your depression since your last visit? Worsened     How are you doing with your anxiety since your last visit?  Worsened     Are you having other symptoms that might be associated with depression or anxiety? Yes:  having anxiety    Have you had a significant life event? No     Do you have any concerns with your use of alcohol or other drugs? No    Social History     Tobacco Use     Smoking status: Never Smoker     Smokeless tobacco: Never Used   Substance Use Topics     Alcohol use: Yes     Alcohol/week: 0.0 standard drinks     Comment: Wine     Drug use: No     PHQ 7/16/2018 4/24/2019 10/23/2019   PHQ-9 Total Score 0 0 0   Q9: Thoughts of better off dead/self-harm past 2 weeks Not at all Not at all Not at all     KANDI-7 SCORE 12/21/2017 1/4/2018 7/16/2018   Total Score 15 10 0     Last PHQ-9 10/23/2019   1.  Little interest or pleasure in doing things 0   2.  Feeling down, depressed, or hopeless 0   3.  Trouble falling or staying asleep, or sleeping too much 0   4.  Feeling tired or having little energy 0   5.  Poor appetite or overeating 0   6.  Feeling bad about yourself 0   7.  Trouble concentrating 0   8.  Moving slowly or restless 0   Q9: Thoughts of better off dead/self-harm past 2 weeks 0   PHQ-9 Total Score 0   Difficulty at work, home, or with people Not difficult at all     KANDI-7  7/16/2018   1. Feeling nervous, anxious, or on edge 0   2. Not being able to stop or control worrying 0   3. Worrying too much about different things 0   4. Trouble relaxing 0   5. Being so restless that it is hard to sit still 0   6. Becoming easily annoyed or irritable 0   7. Feeling afraid, as if something awful might happen 0   KANDI-7 Total Score 0   If you checked any problems, how difficult have they made it for you to do your work, take  care of things at home, or get along with other people? -     In the past two weeks have you had thoughts of suicide or self-harm?  Yes  In the past two weeks have you thought of a plan or intent to harm yourself? No.  Do you have concerns about your personal safety or the safety of others?   No    Suicide Assessment Five-step Evaluation and Treatment (SAFE-T)      How many servings of fruits and vegetables do you eat daily?  2-3    On average, how many sweetened beverages do you drink each day (Examples: soda, juice, sweet tea, etc.  Do NOT count diet or artificially sweetened beverages)?   1    How many days per week do you exercise enough to make your heart beat faster? 5    How many minutes a day do you exercise enough to make your heart beat faster? 30 - 60    How many days per week do you miss taking your medication? 0    Patient notes his anxiety has worsened in the past several weeks.  He was seen in the ED on 7/4/20 after he was awake all night pacing.  His buspirone was increased to 30 mg twice daily and his remeron was stopped and he was started on trazodone.  He notes that anxiety has increased with the Covid-19 pandemic.  He has continued his remeron and took his wife's amitriptyline.  He took trazodone and notes that he was somnolent in the am and was staggering all over.  He does not want to take it again.  He notes that it does not happen when he takes his amitriptyline.  He feels anxiety was better during the day when on amitriptyline.  He was previously on amitriptyline, but it was stopped due to age.    Patient notes aching to his right knee for the past several weeks.  He notes his knee feels unstable.   He notes crepitus.  He notes that he will start to limp when walking.  He denies swelling or redness.  He has tried a brace with some improvement.      Patient Active Problem List   Diagnosis     H/O prostate cancer     History of penile implant     Mixed hyperlipidemia     Gastroesophageal  reflux disease without esophagitis     Moderate single current episode of major depressive disorder (H)     KANDI (generalized anxiety disorder)     Past Surgical History:   Procedure Laterality Date      penile implant  1994     CATARACT IOL, RT/LT       CYSTOSCOPY, DILATE URETHRA, COMBINED N/A 6/1/2017    Procedure: COMBINED CYSTOSCOPY, DILATE URETHRA;  Cysto, urethral ballon dilation and Holmium laser Lithotripsy;  Surgeon: Juanito Vaughan MD;  Location: [facility] OR     HERNIA REPAIR, INGUINAL RT/LT       PROSTATE SURGERY  1994    Prostatectomy       Social History     Tobacco Use     Smoking status: Never Smoker     Smokeless tobacco: Never Used   Substance Use Topics     Alcohol use: Yes     Alcohol/week: 0.0 standard drinks     Comment: Wine     Family History   Problem Relation Age of Onset     Coronary Artery Disease Father         83 MI     Prostate Cancer Paternal Grandfather          Current Outpatient Medications   Medication Sig Dispense Refill     amitriptyline (ELAVIL) 25 MG tablet Take 1 tablet (25 mg) by mouth At Bedtime 30 tablet 1     busPIRone HCl (BUSPAR) 30 MG tablet Take 1 tablet (30 mg) by mouth 2 times daily 180 tablet 1     glucosamine-chondroitin 500-400 MG CAPS Take 1 capsule by mouth daily       mirtazapine (REMERON) 45 MG tablet TAKE 1 TABLET (30 MG) BY MOUTH AT BEDTIME 90 tablet 0     multivitamin, therapeutic with minerals (MULTI-VITAMIN) TABS tablet Take 1 tablet by mouth daily       omeprazole (PRILOSEC) 40 MG DR capsule TAKE 1 CAPSULE BY MOUTH EVERY DAY 90 capsule 2     simvastatin (ZOCOR) 10 MG tablet TAKE 1 TABLET BY MOUTH EVERYDAY AT BEDTIME 90 tablet 0     SLO-NIACIN PO        TURMERIC PO Take by mouth daily       Allergies   Allergen Reactions     Sulfa Drugs Rash     BP Readings from Last 3 Encounters:   07/07/20 106/64   07/05/20 (!) 152/84   02/17/20 128/78    Wt Readings from Last 3 Encounters:   07/07/20 63.1 kg (139 lb 3.2 oz)   02/17/20 63 kg (139 lb)   10/23/19 65.3 kg  "(144 lb)                      Reviewed and updated as needed this visit by Provider         Review of Systems   Constitutional, HEENT, cardiovascular, pulmonary, gi and gu systems are negative, except as otherwise noted.      Objective    BP (!) 154/98   Pulse 133   Temp 97.4  F (36.3  C) (Oral)   Resp 14   Ht 1.778 m (5' 10\")   Wt 63.1 kg (139 lb 3.2 oz)   SpO2 94%   BMI 19.97 kg/m    Body mass index is 19.97 kg/m .  Physical Exam   GENERAL: healthy, alert and no distress  RESP: lungs clear to auscultation - no rales, rhonchi or wheezes  CV: regular rate and rhythm, normal S1 S2, no S3 or S4, no murmur, click or rub, no peripheral edema and peripheral pulses strong  MS: Right knee: full range of motion present, crepitus noted; tenderness to palpation of posterior knee; negative varus/valgus stress tests; negative anterior/posterior drawer tests; positive Juliocesar's test.    PSYCH: concentration poor, anxious, judgement and insight intact and appearance well groomed    Diagnostic Test Results:  none         Assessment & Plan     1. KANDI (generalized anxiety disorder)  Consider weaning down buspirone in the future, once anxiety is stable, as patient feels it is ineffective.  Patient declines therapy referral.  Patient to work on finding new ways to occupy his time.  I suggested [place] and taking walks in a new environment.  - busPIRone HCl (BUSPAR) 30 MG tablet; Take 1 tablet (30 mg) by mouth 2 times daily  Dispense: 180 tablet; Refill: 1    2. Moderate single current episode of major depressive disorder (H)  Patient is aware of risks of taking amitriptyline at his age.  He was also advised of potential symptoms of serotonin syndrome.  Patient to call if symptoms worsening.  - amitriptyline (ELAVIL) 25 MG tablet; Take 1 tablet (25 mg) by mouth At Bedtime  Dispense: 30 tablet; Refill: 1    3. Acute pain of right knee  Possible meniscal tear.  Patient to trial physical therapy first.  - XR Knee " Right 3 Views; Future  - [facility] REFERR[facility]; Future           Return in about 2 weeks (around 7/21/2020) for Medication Recheck, Lab Work.    FLORENTINO Rutherford CNP  [facility]

## 2020-07-04 ENCOUNTER — HOSPITAL ENCOUNTER (EMERGENCY)
Facility: CLINIC | Age: 85
Discharge: HOME | End: 2020-07-05
Attending: EMERGENCY MEDICINE | Admitting: EMERGENCY MEDICINE
Payer: MEDICARE

## 2020-07-04 DIAGNOSIS — F41.9 ANXIETY: ICD-10-CM

## 2020-07-04 PROCEDURE — 99283 EMERGENCY DEPT VISIT LOW MDM: CPT | Mod: Z6 | Performed by: EMERGENCY MEDICINE

## 2020-07-04 PROCEDURE — 99283 EMERGENCY DEPT VISIT LOW MDM: CPT | Performed by: EMERGENCY MEDICINE

## 2020-07-04 NOTE — ED AVS SNAPSHOT
[facility], [place], Emergency Department  [address] [place] [id number]  Phone:  [phone]                                    Carlos Faith   MRN: [id number]    Department:  [facility], Emergency Department   Date of Visit:  7/4/2020           After Visit Summary Signature Page    I have received my discharge instructions, and my questions have been answered. I have discussed any challenges I see with this plan with the nurse or doctor.    ..........................................................................................................................................  Patient/Patient Representative Signature      ..........................................................................................................................................  Patient Representative Print Name and Relationship to Patient    ..................................................               ................................................  Date                                   Time    ..........................................................................................................................................  Reviewed by Signature/Title    ...................................................              ..............................................  Date                                               Time          22EPIC Rev 08/18

## 2020-07-05 VITALS
HEART RATE: 110 BPM | DIASTOLIC BLOOD PRESSURE: 84 MMHG | OXYGEN SATURATION: 100 % | RESPIRATION RATE: 18 BRPM | TEMPERATURE: 98 F | HEIGHT: 70 IN | BODY MASS INDEX: 19.94 KG/M2 | SYSTOLIC BLOOD PRESSURE: 152 MMHG

## 2020-07-05 PROCEDURE — 25000132 ZZH RX MED GY IP 250 OP 250 PS 637: Mod: GY | Performed by: EMERGENCY MEDICINE

## 2020-07-05 RX ORDER — TRAZODONE HYDROCHLORIDE 50 MG/1
50 TABLET, FILM COATED ORAL AT BEDTIME
Qty: 7 TABLET | Refills: 0 | Status: SHIPPED | OUTPATIENT
Start: 2020-07-04 | End: 2020-07-07 | Stop reason: SINTOL

## 2020-07-05 RX ORDER — BUSPIRONE HYDROCHLORIDE 30 MG/1
30 TABLET ORAL 2 TIMES DAILY
Qty: 14 TABLET | Refills: 0 | Status: SHIPPED | OUTPATIENT
Start: 2020-07-04 | End: 2020-07-07

## 2020-07-05 RX ORDER — TRAZODONE HYDROCHLORIDE 50 MG/1
50 TABLET, FILM COATED ORAL ONCE
Status: COMPLETED | OUTPATIENT
Start: 2020-07-05 | End: 2020-07-05

## 2020-07-05 RX ADMIN — TRAZODONE HYDROCHLORIDE 50 MG: 50 TABLET ORAL at 00:21

## 2020-07-05 NOTE — ED TRIAGE NOTES
Present to ER with wife for ongoing anxiety, is prescribed medications but over the past few days has been very restless, insomnia, 's in triage.

## 2020-07-05 NOTE — DISCHARGE INSTRUCTIONS
Please make an appointment to follow up with Your Primary Care Provider as soon as possible.  Start taking trazodone at night 50 mg.  Stop taking your Remeron.  Your new dose of BuSpar should be 30 mg twice daily.  These dosage and prescriptions may be changed by your provider 1 to have a follow-up.  This is in in lieu of that appointment.

## 2020-07-05 NOTE — ED PROVIDER NOTES
[facility] EMERGENCY DEPARTMENT ([facility])  July 4, 2020  History     Chief Complaint   Patient presents with     Anxiety     HPI  Carlos Faith is a 85 year old male with a medical history significant for prostate cancer, duodenal ulcer, GERD, HLD, KANDI, and a single current episode of MDD who presents to the ED today complaining of anxiety.  Patient states that he is taking BuSpar 10 mg twice daily and Remeron 45 mg at night and continues to have significant anxiety and insomnia.  Patient states that he is been having this for a number of years.  He said over the past month or so it seems to be getting progressively worse.  He has had no significant changes in his medications as of late.  He states that there is no significant exciting factors for his anxiety.  He states that he does take all of his medications as appropriate.    I have reviewed the Medications, Allergies, Past Medical and Surgical History, and Social History in the [facility] system.  PAST MEDICAL HISTORY:   Past Medical History:   Diagnosis Date     Anxiety      Duodenal ulcer      GERD without esophagitis      Hyperlipidemia      Prostate cancer (H) 1994       PAST SURGICAL HISTORY:   Past Surgical History:   Procedure Laterality Date      penile implant  1994     CATARACT IOL, RT/LT       CYSTOSCOPY, DILATE URETHRA, COMBINED N/A 6/1/2017    Procedure: COMBINED CYSTOSCOPY, DILATE URETHRA;  Cysto, urethral ballon dilation and Holmium laser Lithotripsy;  Surgeon: Juanito Vaughan MD;  Location: [facility] OR     HERNIA REPAIR, INGUINAL RT/LT       PROSTATE SURGERY  1994    Prostatectomy       Past medical history, past surgical history, medications, and allergies were reviewed with the patient. Additional pertinent items: None    FAMILY HISTORY:   Family History   Problem Relation Age of Onset     Coronary Artery Disease Father         83 MI     Prostate Cancer Paternal Grandfather        SOCIAL HISTORY:   Social History     Tobacco Use      Smoking status: Never Smoker     Smokeless tobacco: Never Used   Substance Use Topics     Alcohol use: Yes     Alcohol/week: 0.0 standard drinks     Comment: Wine     Social history was reviewed with the patient. Additional pertinent items: None      Discharge Medication List as of 7/5/2020 12:12 AM      START taking these medications    Details   !! busPIRone HCl (BUSPAR) 30 MG tablet Take 1 tablet (30 mg) by mouth 2 times daily, Disp-14 tablet,R-0, Local Print      traZODone (DESYREL) 50 MG tablet Take 1 tablet (50 mg) by mouth At Bedtime, Disp-7 tablet,R-0, Local Print       !! - Potential duplicate medications found. Please discuss with provider.      CONTINUE these medications which have NOT CHANGED    Details   !! busPIRone HCl (BUSPAR) 30 MG tablet TAKE 1/2 TABLET (15 MG) BY MOUTH 2 TIMES DAILY, Disp-90 tablet, R-1, E-Prescribe      glucosamine-chondroitin 500-400 MG CAPS Take 1 capsule by mouth daily, Historical      !! mirtazapine (REMERON) 30 MG tablet TAKE 1 TABLET (30 MG) BY MOUTH AT BEDTIME, Disp-90 tablet,R-0, E-Prescribe      !! mirtazapine (REMERON) 45 MG tablet TAKE 1 TABLET (30 MG) BY MOUTH AT BEDTIME, Disp-90 tablet,R-0, E-Prescribe      multivitamin, therapeutic with minerals (MULTI-VITAMIN) TABS tablet Take 1 tablet by mouth daily, Historical      omeprazole (PRILOSEC) 40 MG DR capsule TAKE 1 CAPSULE BY MOUTH EVERY DAY, Disp-90 capsule,R-2, E-Prescribe      simvastatin (ZOCOR) 10 MG tablet TAKE 1 TABLET BY MOUTH EVERYDAY AT BEDTIME, Disp-90 tablet,R-0, E-PrescribeMedication is being filled for 1 time refill only due to:  Patient needs labs FASTING LIPIDS.      SLO-NIACIN PO Historical      TURMERIC PO Take by mouth daily, Historical       !! - Potential duplicate medications found. Please discuss with provider.             Allergies   Allergen Reactions     Sulfa Drugs Rash        Review of Systems  A complete review of systems was performed with pertinent positives and negatives noted in  "the HPI, and all other systems negative.    Physical Exam   BP: (!) 145/85  Pulse: 110  Heart Rate: 116  Temp: 97.5  F (36.4  C)  Resp: 18  Height: 177.8 cm (5' 10\")  SpO2: 96 %      Physical Exam  Vitals signs reviewed.   Constitutional:       General: He is not in acute distress.     Appearance: He is well-developed. He is not ill-appearing, toxic-appearing or diaphoretic.      Comments: Comfortably resting, lying in bed, NAD, nondiaphoretic, lucid, fully conversant, no  respiratory distress, alert and oriented.     HENT:      Head: Normocephalic and atraumatic.      Mouth/Throat:      Mouth: Mucous membranes are moist.   Eyes:      General: No scleral icterus.  Neck:      Musculoskeletal: Normal range of motion and neck supple.   Cardiovascular:      Rate and Rhythm: Tachycardia present.   Pulmonary:      Effort: Pulmonary effort is normal. No respiratory distress.   Skin:     General: Skin is warm and dry.      Findings: No rash.   Neurological:      Mental Status: He is alert and oriented to person, place, and time.   Psychiatric:      Comments: anxious         ED Course        Procedures                           No results found for this or any previous visit (from the past 24 hour(s)).  Medications   traZODone (DESYREL) tablet 50 mg (50 mg Oral Given 7/5/20 0021)             Assessments & Plan (with Medical Decision Making)   This is a 85-year-old male coming into the emergency room with a significant past medical history of anxiety is been taking both BuSpar and Remeron with no significant improvement of his symptoms over the past month.  He states that he has been pacing the hallway and every 15 minutes he is finding it difficult to sit still.  He has been up most nights with complete sleeplessness.  He states that otherwise he feels healthy and denies any other significant complaints.  On physical exam his vitals are slightly tachycardic but otherwise unremarkable.  I did review his medical chart which " "does show that he is taking BuSpar and Remeron.  At this time he does have an appointment on Wednesday.  But in the meantime I will ask him to stop taking his Remeron and will provide him with a dose of trazodone and a short prescription to see if this has any effect on his sleep.  I will also instruct him to increase his BuSpar to 30 mg twice daily.    Pt was discharged home/self-care.  PT was provided written discharge instructions. Additionally verbal instructions were given and discussed with patient.  PT was asked to return to the ED immediately for any new or concerning symptoms.  Pt was in agreement, endorsed understanding, and questions were answered.       I have reviewed the nursing notes.    I have reviewed the findings, diagnosis, plan and need for follow up with the patient.    Discharge Medication List as of 7/5/2020 12:12 AM      START taking these medications    Details   !! busPIRone HCl (BUSPAR) 30 MG tablet Take 1 tablet (30 mg) by mouth 2 times daily, Disp-14 tablet,R-0, Local Print      traZODone (DESYREL) 50 MG tablet Take 1 tablet (50 mg) by mouth At Bedtime, Disp-7 tablet,R-0, Local Print       !! - Potential duplicate medications found. Please discuss with provider.          Final diagnoses:   Anxiety     \"This dictation was performed with the assistance of voice recognition software and may contain inadvertant transcription  errors,  omissions and/or  inadvertent word substitution.\" --Ralph Cuevas MD       7/4/2020   [facility] EMERGENCY DEPARTMENT     Ralph Cuevas MD  07/05/20 0101    "

## 2020-07-07 ENCOUNTER — ANCILLARY PROCEDURE (OUTPATIENT)
Dept: GENERAL RADIOLOGY | Facility: CLINIC | Age: 85
End: 2020-07-07
Attending: NURSE PRACTITIONER
Payer: MEDICARE

## 2020-07-07 ENCOUNTER — OFFICE VISIT (OUTPATIENT)
Dept: FAMILY MEDICINE | Facility: CLINIC | Age: 85
End: 2020-07-07
Payer: MEDICARE

## 2020-07-07 VITALS
OXYGEN SATURATION: 94 % | HEART RATE: 84 BPM | DIASTOLIC BLOOD PRESSURE: 64 MMHG | WEIGHT: 139.2 LBS | RESPIRATION RATE: 14 BRPM | SYSTOLIC BLOOD PRESSURE: 106 MMHG | BODY MASS INDEX: 19.93 KG/M2 | TEMPERATURE: 97.4 F | HEIGHT: 70 IN

## 2020-07-07 DIAGNOSIS — F41.1 GAD (GENERALIZED ANXIETY DISORDER): Primary | ICD-10-CM

## 2020-07-07 DIAGNOSIS — M25.561 ACUTE PAIN OF RIGHT KNEE: ICD-10-CM

## 2020-07-07 DIAGNOSIS — F32.1 MODERATE SINGLE CURRENT EPISODE OF MAJOR DEPRESSIVE DISORDER (H): ICD-10-CM

## 2020-07-07 PROCEDURE — 73562 X-RAY EXAM OF KNEE 3: CPT | Mod: RT

## 2020-07-07 PROCEDURE — 99214 OFFICE O/P EST MOD 30 MIN: CPT | Performed by: NURSE PRACTITIONER

## 2020-07-07 RX ORDER — BUSPIRONE HYDROCHLORIDE 30 MG/1
30 TABLET ORAL 2 TIMES DAILY
Qty: 180 TABLET | Refills: 1 | Status: SHIPPED | OUTPATIENT
Start: 2020-07-07 | End: 2020-07-22

## 2020-07-07 RX ORDER — SIMVASTATIN 10 MG
TABLET ORAL
Qty: 90 TABLET | Refills: 0 | Status: CANCELLED | OUTPATIENT
Start: 2020-07-07

## 2020-07-07 ASSESSMENT — ANXIETY QUESTIONNAIRES
3. WORRYING TOO MUCH ABOUT DIFFERENT THINGS: NOT AT ALL
1. FEELING NERVOUS, ANXIOUS, OR ON EDGE: NEARLY EVERY DAY
2. NOT BEING ABLE TO STOP OR CONTROL WORRYING: NEARLY EVERY DAY
7. FEELING AFRAID AS IF SOMETHING AWFUL MIGHT HAPPEN: NOT AT ALL
5. BEING SO RESTLESS THAT IT IS HARD TO SIT STILL: NEARLY EVERY DAY
6. BECOMING EASILY ANNOYED OR IRRITABLE: NOT AT ALL
IF YOU CHECKED OFF ANY PROBLEMS ON THIS QUESTIONNAIRE, HOW DIFFICULT HAVE THESE PROBLEMS MADE IT FOR YOU TO DO YOUR WORK, TAKE CARE OF THINGS AT HOME, OR GET ALONG WITH OTHER PEOPLE: NOT DIFFICULT AT ALL
GAD7 TOTAL SCORE: 12

## 2020-07-07 ASSESSMENT — MIFFLIN-ST. JEOR: SCORE: 1322.66

## 2020-07-07 ASSESSMENT — PATIENT HEALTH QUESTIONNAIRE - PHQ9
5. POOR APPETITE OR OVEREATING: NEARLY EVERY DAY
SUM OF ALL RESPONSES TO PHQ QUESTIONS 1-9: 15

## 2020-07-07 NOTE — LETTER
July 8, 2020      Carlos Faith  [address]   [place] [facility] [address]          Dear ,    We are writing to inform you of your test results.  Your x-ray shows severe arthritis to your knee with a small amount of fluid related to your arthritis.  Please continue to use your brace and trial physical therapy as planned.       Resulted Orders   XR Knee Right 3 Views    Narrative    XR KNEE RT 3 VW 7/7/2020 9:55 AM     HISTORY: Acute pain of right knee      Impression    IMPRESSION: Medial compartment severe joint space narrowing, best  appreciated on the lateral view. Nonspecific joint effusion. Vascular  calcification.    ZEKE ABDALLA MD       If you have any questions or concerns, please call the clinic at the number listed above.       Sincerely,      Laurita Gold, CNP

## 2020-07-08 ASSESSMENT — ANXIETY QUESTIONNAIRES: GAD7 TOTAL SCORE: 12

## 2020-07-08 NOTE — RESULT ENCOUNTER NOTE
Dear Carlos,    Your recent test results are attached.      Your x-ray shows severe arthritis to your knee with a small amount of fluid related to your arthritis.  Please continue to use your brace and trial physical therapy as planned.    If you have any questions please feel free to contact [phone] or myself via [email or web address].    Sincerely,  Laurita Gold, CNP

## 2020-07-09 ENCOUNTER — TELEPHONE (OUTPATIENT)
Dept: FAMILY MEDICINE | Facility: CLINIC | Age: 85
End: 2020-07-09

## 2020-07-09 DIAGNOSIS — F32.1 MODERATE SINGLE CURRENT EPISODE OF MAJOR DEPRESSIVE DISORDER (H): ICD-10-CM

## 2020-07-09 NOTE — TELEPHONE ENCOUNTER
Please have him decrease amitriptyline to 1/2 tablet.  Did he take any pepto bismal or mylanta prior to black stools?  Has nausea continued?  If it has, can he be scheduled asap in clinic to evaluate for GI bleed?  I think that this is likely unrelated to his amitriptyline if he is having black stools.    Laurita Gold, CNP

## 2020-07-09 NOTE — TELEPHONE ENCOUNTER
Spoke with patient  Nausea resolved and was only present a few hours after taking amitriptyline   He did take pepto bismal about 4-5 days ago  Explained to him that this may have caused his black stools  Advised to avoid taking any further pepto bismal or mylanta and if nausea or black stools return, he should call the clinic to be seen  He will decrease amitriptyline to 1/2 tab  Patient verbalized understanding.    Jania Santamaria RN

## 2020-07-09 NOTE — TELEPHONE ENCOUNTER
Per patient, he just started amitriptyline on 7/7/2020  He noted black stools 2 days ago and nausea yesterday a couple of hours after taking the amitryptyline  Has not had any further BMs since  He stated that he read some literature on the medication and it says that the med may cause black stools and nausea   He is wondering if dose should be decreased to half tab or what should he do    Jania Santamaria RN

## 2020-07-09 NOTE — TELEPHONE ENCOUNTER
Reason for Call:  Other prescription    Detailed comments: Patient calling, states he is having a reaction to amitriptyline (ELAVIL) 25 MG tablet. Patient had black stool and felt nauseous. Please call back to advise.    Phone Number Patient can be reached at: Home number on file [phone] (home)    Best Time: any    Can we leave a detailed message on this number? YES    Call taken on 7/9/2020 at 11:39 AM by Armando Rich

## 2020-07-10 ENCOUNTER — THERAPY VISIT (OUTPATIENT)
Dept: PHYSICAL THERAPY | Facility: CLINIC | Age: 85
End: 2020-07-10
Attending: NURSE PRACTITIONER
Payer: MEDICARE

## 2020-07-10 DIAGNOSIS — M25.562 CHRONIC PAIN OF LEFT KNEE: ICD-10-CM

## 2020-07-10 DIAGNOSIS — G89.29 CHRONIC PAIN OF RIGHT KNEE: ICD-10-CM

## 2020-07-10 DIAGNOSIS — G89.29 CHRONIC PAIN OF LEFT KNEE: ICD-10-CM

## 2020-07-10 DIAGNOSIS — M25.561 ACUTE PAIN OF RIGHT KNEE: ICD-10-CM

## 2020-07-10 DIAGNOSIS — M25.561 CHRONIC PAIN OF RIGHT KNEE: ICD-10-CM

## 2020-07-10 PROCEDURE — 97161 PT EVAL LOW COMPLEX 20 MIN: CPT | Mod: GP | Performed by: PHYSICAL THERAPIST

## 2020-07-10 PROCEDURE — 97110 THERAPEUTIC EXERCISES: CPT | Mod: GP | Performed by: PHYSICAL THERAPIST

## 2020-07-10 ASSESSMENT — ACTIVITIES OF DAILY LIVING (ADL)
SIT WITH YOUR KNEE BENT: ACTIVITY IS NOT DIFFICULT
RISE FROM A CHAIR: ACTIVITY IS NOT DIFFICULT
PAIN: I HAVE THE SYMPTOM BUT IT DOES NOT AFFECT MY ACTIVITY
RAW_SCORE: 64
WALK: ACTIVITY IS MINIMALLY DIFFICULT
STAND: ACTIVITY IS NOT DIFFICULT
SQUAT: ACTIVITY IS MINIMALLY DIFFICULT
SWELLING: I DO NOT HAVE THE SYMPTOM
KNEE_ACTIVITY_OF_DAILY_LIVING_SUM: 64
KNEEL ON THE FRONT OF YOUR KNEE: ACTIVITY IS NOT DIFFICULT
LIMPING: I HAVE THE SYMPTOM BUT IT DOES NOT AFFECT MY ACTIVITY
GO DOWN STAIRS: ACTIVITY IS NOT DIFFICULT
GIVING WAY, BUCKLING OR SHIFTING OF KNEE: I HAVE THE SYMPTOM BUT IT DOES NOT AFFECT MY ACTIVITY
AS_A_RESULT_OF_YOUR_KNEE_INJURY,_HOW_WOULD_YOU_RATE_YOUR_CURRENT_LEVEL_OF_DAILY_ACTIVITY?: NEARLY NORMAL
HOW_WOULD_YOU_RATE_THE_CURRENT_FUNCTION_OF_YOUR_KNEE_DURING_YOUR_USUAL_DAILY_ACTIVITIES_ON_A_SCALE_FROM_0_TO_100_WITH_100_BEING_YOUR_LEVEL_OF_KNEE_FUNCTION_PRIOR_TO_YOUR_INJURY_AND_0_BEING_THE_INABILITY_TO_PERFORM_ANY_OF_YOUR_USUAL_DAILY_ACTIVITIES?: 90
STIFFNESS: I HAVE THE SYMPTOM BUT IT DOES NOT AFFECT MY ACTIVITY
HOW_WOULD_YOU_RATE_THE_OVERALL_FUNCTION_OF_YOUR_KNEE_DURING_YOUR_USUAL_DAILY_ACTIVITIES?: NEARLY NORMAL
KNEE_ACTIVITY_OF_DAILY_LIVING_SCORE: 91.43
GO UP STAIRS: ACTIVITY IS NOT DIFFICULT
WEAKNESS: I DO NOT HAVE THE SYMPTOM

## 2020-07-10 NOTE — PROGRESS NOTES
[facility] Initial Evaluation  Subjective:  The history is provided by the patient. No  was used.   Patient Health History  Carlos Faith being seen for R Knee Pain.     Problem began: 7/7/2020.   Problem occurred: unknown   Pain is reported as 2/10 on pain scale.  General health as reported by patient is excellent.  Pertinent medical history includes: cancer, depression and other (pt feels he has some anxiety).   Red flags:  None as reported by patient.  Medical allergies: none.   Surgeries include:  Cancer surgery. Other surgery history details: Prostate CA (25 years ago).    Current medications:  Anti-depressants and sleep medication.    Current occupation is Retired-  Enjoys playing [employer] Ball (has not played in 3 months due to COVID).                     Therapist Generated HPI Evaluation  Problem details: Patient presents to PT today with c/o R knee pain. Patient stated the pain started over 3 months ago;  Feels is related to not playing [employer] ball and doing his regular routine.  Patients routine now consists of 1 mile walk each day..         Type of problem:  Right knee.    This is a new condition.  Condition occurred with:  Insidious onset.    Patient reports pain:  Posterior and in the joint.    Radiates to: none.     Associated symptoms:  Buckling/giving out. Symptoms are exacerbated by walking and transfers  and relieved by rest.                 Knee Activity of Daily Living Score: 91.43            Objective:  Standing Alignment:              Knee:  Genu varus L and genu varus R      Gait:  Genu Varus noted Bilaterally    Gait Type:  Antalgic   Assistive Devices:  None  Deviations:  Lumbar:  Trunk flexion                                                 Hip Evaluation    Hip Strength:    Flexion:   Left: 4-/5   Pain:  Right: 4-/5   Pain:                    Extension:  Left: 4-/5  Pain:Right: 4-/5    Pain:    Abduction:  Left: 4/5     Pain:Right: 4/5     Pain:                           Knee Evaluation:  ROM:  Arom wnl knee: HS tightness noted with SLR Bilaterally.    AROM      Extension:  Left: 4    Right:  6  Flexion: Left: 136    Right: 128        Strength:     Extension:  Left: 4+/5   Pain:      Right: 4/5   Pain:  Flexion:  Left: 5/5   Pain:      Right: 5/5   Pain:    Quad Set Left: Good    Pain:   Quad Set Right: Good    Pain:  Ligament Testing:  Normal                Special Tests:   Left knee positive for the following special tests:  Patellar Tracking-Abduction Lateral  Right knee positive for the following tests:  Patellar Tracking-Abduction Lateral  Palpation:      Right knee tenderness present at:  Popliteal  Edema:  Normal            General     ROS    Assessment/Plan:    Patient is a 85 year old male with right side knee complaints.    Patient has the following significant findings with corresponding treatment plan.                Diagnosis 1:  R knee pain  Pain -  hot/cold therapy, manual therapy and splint/taping/bracing/orthotics  Decreased ROM/flexibility - manual therapy, therapeutic exercise and therapeutic activity  Decreased strength - therapeutic exercise, therapeutic activities and home program  Impaired gait - gait training and assistive devices  Impaired muscle performance - neuro re-education and home program  Decreased function - therapeutic activities and home program    Therapy Evaluation Codes:   1) History comprised of:   Personal factors that impact the plan of care:      Age and Past/current experiences.    Comorbidity factors that impact the plan of care are:      Cancer, Depression and pt feels he has anxiety.     Medications impacting care: tylenol.  2) Examination of Body Systems comprised of:   Body structures and functions that impact the plan of care:      Knee.   Activity limitations that impact the plan of care are:      Bending, Squatting/kneeling, Walking and sit to stand.  3) Clinical presentation characteristics  are:   Stable/Uncomplicated.  4) Decision-Making    Low complexity using standardized patient assessment instrument and/or measureable assessment of functional outcome.  Cumulative Therapy Evaluation is: Low complexity.    Previous and current functional limitations:  (See Goal Flow Sheet for this information)    Short term and Long term goals: (See Goal Flow Sheet for this information)     Communication ability:  Patient appears to be able to clearly communicate and understand verbal and written communication and follow directions correctly.  Treatment Explanation - The following has been discussed with the patient:   RX ordered/plan of care  Anticipated outcomes  Possible risks and side effects  This patient would benefit from PT intervention to resume normal activities.   Rehab potential is good.    Frequency:  1 X week, once daily  Duration:  for 6 weeks  Discharge Plan:  Achieve all LTG.  Independent in home treatment program.  Reach maximal therapeutic benefit.    Please refer to the daily flowsheet for treatment today, total treatment time and time spent performing 1:1 timed codes.

## 2020-07-10 NOTE — LETTER
DEPARTMENT OF HEALTH AND HUMAN SERVICES  CENTERS FOR MEDICARE & MEDICAID SERVICES    PLAN/UPDATED PLAN OF PROGRESS FOR OUTPATIENT REHABILITATION    PATIENTS NAME:  Carlos Faith   : 1935  PROVIDER NUMBER:    [id number]  [id number]   PROVIDER NAME: [facility]  MEDICAL RECORD NUMBER: [id number]   START OF CARE DATE:  SOC Date: 07/10/20   TYPE:  PT    PRIMARY/TREATMENT DIAGNOSIS: (Pertinent Medical Diagnosis)     Acute pain of right knee  Chronic pain of left knee  Chronic pain of right knee    VISITS FROM START OF CARE:  Rxs Used: 1     [facility] Initial Evaluation  Subjective:  The history is provided by the patient. No  was used.   Patient Health History  Carlos Faith being seen for R Knee Pain.   Problem began: 2020.   Problem occurred: unknown   Pain is reported as 2/10 on pain scale.  General health as reported by patient is excellent.  Pertinent medical history includes: cancer, depression and other (pt feels he has some anxiety).   Red flags:  None as reported by patient.  Medical allergies: none.   Surgeries include:  Cancer surgery. Other surgery history details: Prostate CA (25 years ago).    Current medications:  Anti-depressants and sleep medication.    Current occupation is Retired-  Enjoys playing [employer] Ball (has not played in 3 months due to COVID).      Therapist Generated HPI Evaluation  Problem details: Patient presents to PT today with c/o R knee pain. Patient stated the pain started over 3 months ago;  Feels is related to not playing [employer] ball and doing his regular routine.  Patients routine now consists of 1 mile walk each day..         Type of problem:  Right knee.    This is a new condition.  Condition occurred with:  Insidious onset.  Patient reports pain:  Posterior and in the joint.  Radiates to: none.   Associated symptoms:  Buckling/giving out. Symptoms are exacerbated by walking and transfers  and relieved by  rest.  Knee Activity of Daily Living Score: 91.43            PATIENTS NAME:  Carlos Faith   : 1935    Objective:  Standing Alignment:    Knee:  Genu varus L and genu varus R    Gait:  Genu Varus noted Bilaterally  Gait Type:  Antalgic   Assistive Devices:  None  Deviations:  Lumbar:  Trunk flexion       Hip Evaluation  Hip Strength:    Flexion:   Left: 4-/5   Pain:  Right: 4-/5   Pain:                Extension:  Left: 4-/5  Pain:Right: 4-/5    Pain:    Abduction:  Left: 4/5     Pain:Right: 4/5    Pain:       Knee Evaluation:  ROM:  Arom wnl knee: HS tightness noted with SLR Bilaterally.    AROM  Extension:  Left: 4    Right:  6  Flexion: Left: 136    Right: 128    Strength:   Extension:  Left: 4+/5   Pain:      Right: 4/5   Pain:  Flexion:  Left: 5/5   Pain:      Right: 5/5   Pain:    Quad Set Left: Good    Pain:   Quad Set Right: Good    Pain:    Ligament Testing:  Normal    Special Tests:   Left knee positive for the following special tests:  Patellar Tracking-Abduction Lateral  Right knee positive for the following tests:  Patellar Tracking-Abduction Lateral    Palpation:    Right knee tenderness present at:  Popliteal    Edema:  Normal    Assessment/Plan:     Patient is a 85 year old male with right side knee complaints.    Patient has the following significant findings with corresponding treatment plan.                Diagnosis 1:  R knee pain  Pain -  hot/cold therapy, manual therapy and splint/taping/bracing/orthotics  Decreased ROM/flexibility - manual therapy, therapeutic exercise and therapeutic activity  Decreased strength - therapeutic exercise, therapeutic activities and home program  Impaired gait - gait training and assistive devices  Impaired muscle performance - neuro re-education and home program  Decreased function - therapeutic activities and home program    Therapy Evaluation Codes:   1) History comprised of:   Personal factors that impact the plan of care:        PATIENTS NAME:   Carlos Faith   : 1935    Age and Past/current experiences.    Comorbidity factors that impact the plan of care are:      Cancer, Depression and pt feels he has anxiety.     Medications impacting care: tylenol.  2) Examination of Body Systems comprised of:   Body structures and functions that impact the plan of care:      Knee.   Activity limitations that impact the plan of care are:      Bending, Squatting/kneeling, Walking and sit to stand.  3) Clinical presentation characteristics are:   Stable/Uncomplicated.  4) Decision-Making    Low complexity using standardized patient assessment instrument and/or measureable assessment of functional outcome.  Cumulative Therapy Evaluation is: Low complexity.    Previous and current functional limitations:  (See Goal Flow Sheet for this information)    Short term and Long term goals: (See Goal Flow Sheet for this information)     Communication ability:  Patient appears to be able to clearly communicate and understand verbal and written communication and follow directions correctly.  Treatment Explanation - The following has been discussed with the patient:   RX ordered/plan of care  Anticipated outcomes  Possible risks and side effects  This patient would benefit from PT intervention to resume normal activities.   Rehab potential is good.    Frequency:  1 X week, once daily  Duration:  for 6 weeks  Discharge Plan:  Achieve all LTG.  Independent in home treatment program.  Reach maximal therapeutic benefit.    Please refer to the daily flowsheet for treatment today, total treatment time and time spent performing 1:1 timed codes.     Caregiver Signature/Credentials _____________________________ Date ________       Treating Provider: Jasmin Linder, MPT   I have reviewed and certified the need for these services and plan of treatment while under my care.    PHYSICIAN'S SIGNATURE:   _________________________________________  Date___________   Laurita Vogel  "RONY Leblanc  Certification period:  Beginning of Cert date period: 07/10/20 to  End of Cert period date: 09/07/20(60 day cert)   Functional Level Progress Report: Please see attached \"Goal Flow sheet for Functional level.  ____X____ Continue Services or       ________ DC Services                Service dates: From  SOC Date: 07/10/20 date to present                         "

## 2020-07-14 DIAGNOSIS — E78.2 MIXED HYPERLIPIDEMIA: ICD-10-CM

## 2020-07-14 RX ORDER — SIMVASTATIN 10 MG
TABLET ORAL
Qty: 90 TABLET | Refills: 0 | Status: SHIPPED | OUTPATIENT
Start: 2020-07-14 | End: 2020-07-22

## 2020-07-20 NOTE — PROGRESS NOTES
"Subjective     Carlos Faith is a 85 year old male who presents to clinic today for the following health issues:    HPI       Medication Followup of amitriptyline    Taking Medication as prescribed: yes    Side Effects:  \"stools are heavier\"    Medication Helping Symptoms:  yes       Patient notes that his anxiety has resolved.  He is sleeping well and notes that he does have harder stools since resuming amitriptyline.  He otherwise denies side effects with medications.  He denies thoughts of suicide or self harm.      Hyperlipidemia Follow-Up      Are you regularly taking any medication or supplement to lower your cholesterol?   Yes- simvastatin    Are you having muscle aches or other side effects that you think could be caused by your cholesterol lowering medication?  No      Patient Active Problem List   Diagnosis     H/O prostate cancer     History of penile implant     Mixed hyperlipidemia     Gastroesophageal reflux disease without esophagitis     Moderate single current episode of major depressive disorder (H)     KANDI (generalized anxiety disorder)     Chronic pain of right knee     Past Surgical History:   Procedure Laterality Date      penile implant  1994     CATARACT IOL, RT/LT       CYSTOSCOPY, DILATE URETHRA, COMBINED N/A 6/1/2017    Procedure: COMBINED CYSTOSCOPY, DILATE URETHRA;  Cysto, urethral ballon dilation and Holmium laser Lithotripsy;  Surgeon: Juanito Vaughan MD;  Location: [facility]     HERNIA REPAIR, INGUINAL RT/LT       PROSTATE SURGERY  1994    Prostatectomy       Social History     Tobacco Use     Smoking status: Never Smoker     Smokeless tobacco: Never Used   Substance Use Topics     Alcohol use: Yes     Alcohol/week: 0.0 standard drinks     Comment: Wine     Family History   Problem Relation Age of Onset     Coronary Artery Disease Father         83 MI     Prostate Cancer Paternal Grandfather          Current Outpatient Medications   Medication Sig Dispense Refill     " amitriptyline (ELAVIL) 25 MG tablet Take 0.5 tablets (12.5 mg) by mouth At Bedtime 15 tablet 0     busPIRone HCl (BUSPAR) 30 MG tablet Take 1 tablet (30 mg) by mouth 2 times daily 180 tablet 1     glucosamine-chondroitin 500-400 MG CAPS Take 1 capsule by mouth daily       mirtazapine (REMERON) 45 MG tablet Take 1 tablet (45 mg) by mouth At Bedtime 90 tablet 1     multivitamin, therapeutic with minerals (MULTI-VITAMIN) TABS tablet Take 1 tablet by mouth daily       omeprazole (PRILOSEC) 40 MG DR capsule TAKE 1 CAPSULE BY MOUTH EVERY DAY 90 capsule 2     simvastatin (ZOCOR) 10 MG tablet TAKE 1 TABLET BY MOUTH EVERYDAY AT BEDTIME 90 tablet 1     SLO-NIACIN PO        TURMERIC PO Take by mouth daily       Allergies   Allergen Reactions     Sulfa Drugs Rash     BP Readings from Last 3 Encounters:   07/22/20 124/76   07/07/20 106/64   07/05/20 (!) 152/84    Wt Readings from Last 3 Encounters:   07/22/20 62.6 kg (138 lb)   07/07/20 63.1 kg (139 lb 3.2 oz)   02/17/20 63 kg (139 lb)                    Reviewed and updated as needed this visit by Provider         Review of Systems   Constitutional, HEENT, cardiovascular, pulmonary, gi and gu systems are negative, except as otherwise noted.      Objective    There were no vitals taken for this visit.  There is no height or weight on file to calculate BMI.  Physical Exam   GENERAL: healthy, alert and no distress  RESP: lungs clear to auscultation - no rales, rhonchi or wheezes  CV: regular rate and rhythm, normal S1 S2, no S3 or S4, no murmur, click or rub, no peripheral edema and peripheral pulses strong  MS: no gross musculoskeletal defects noted, no edema  PSYCH: mentation appears normal, anxious, judgement and insight intact and appearance well groomed    Diagnostic Test Results:  none         Assessment & Plan     1. Moderate single current episode of major depressive disorder (H)  Stable.  Continue current treatment plan and medications.   Patient to try decreasing  mirtazapine to 30 mg nightly to limit risk of serotonin syndrome.  - amitriptyline (ELAVIL) 25 MG tablet; Take 0.5 tablets (12.5 mg) by mouth At Bedtime  Dispense: 15 tablet; Refill: 0    2. KANDI (generalized anxiety disorder)  As above.   - mirtazapine (REMERON) 45 MG tablet; Take 1 tablet (45 mg) by mouth At Bedtime  Dispense: 90 tablet; Refill: 1  - busPIRone HCl (BUSPAR) 30 MG tablet; Take 1 tablet (30 mg) by mouth 2 times daily  Dispense: 180 tablet; Refill: 1    3. Mixed hyperlipidemia  Stable.  Continue current treatment plan and medications.   - Lipid panel reflex to direct LDL Fasting  - simvastatin (ZOCOR) 10 MG tablet; TAKE 1 TABLET BY MOUTH EVERYDAY AT BEDTIME  Dispense: 90 tablet; Refill: 1           Return in about 6 months (around 1/22/2021) for Physical Exam.    FLORENTINO Rutherford [facility]

## 2020-07-22 ENCOUNTER — OFFICE VISIT (OUTPATIENT)
Dept: FAMILY MEDICINE | Facility: CLINIC | Age: 85
End: 2020-07-22
Payer: MEDICARE

## 2020-07-22 VITALS
OXYGEN SATURATION: 94 % | TEMPERATURE: 98 F | HEART RATE: 114 BPM | RESPIRATION RATE: 14 BRPM | BODY MASS INDEX: 19.8 KG/M2 | SYSTOLIC BLOOD PRESSURE: 124 MMHG | WEIGHT: 138 LBS | DIASTOLIC BLOOD PRESSURE: 76 MMHG

## 2020-07-22 DIAGNOSIS — F41.1 GAD (GENERALIZED ANXIETY DISORDER): ICD-10-CM

## 2020-07-22 DIAGNOSIS — E78.2 MIXED HYPERLIPIDEMIA: ICD-10-CM

## 2020-07-22 DIAGNOSIS — F32.1 MODERATE SINGLE CURRENT EPISODE OF MAJOR DEPRESSIVE DISORDER (H): ICD-10-CM

## 2020-07-22 LAB
CHOLEST SERPL-MCNC: 176 MG/DL
HDLC SERPL-MCNC: 69 MG/DL
LDLC SERPL CALC-MCNC: 90 MG/DL
NONHDLC SERPL-MCNC: 107 MG/DL
TRIGL SERPL-MCNC: 84 MG/DL

## 2020-07-22 PROCEDURE — 80061 LIPID PANEL: CPT | Performed by: NURSE PRACTITIONER

## 2020-07-22 PROCEDURE — 36415 COLL VENOUS BLD VENIPUNCTURE: CPT | Performed by: NURSE PRACTITIONER

## 2020-07-22 PROCEDURE — 99214 OFFICE O/P EST MOD 30 MIN: CPT | Performed by: NURSE PRACTITIONER

## 2020-07-22 RX ORDER — SIMVASTATIN 10 MG
TABLET ORAL
Qty: 90 TABLET | Refills: 1 | Status: CANCELLED | OUTPATIENT
Start: 2020-07-22

## 2020-07-22 RX ORDER — MIRTAZAPINE 45 MG/1
45 TABLET, FILM COATED ORAL AT BEDTIME
Qty: 90 TABLET | Refills: 1 | Status: SHIPPED | OUTPATIENT
Start: 2020-07-22 | End: 2020-08-25

## 2020-07-22 RX ORDER — SIMVASTATIN 10 MG
TABLET ORAL
Qty: 90 TABLET | Refills: 1 | Status: SHIPPED | OUTPATIENT
Start: 2020-07-22 | End: 2020-07-28

## 2020-07-22 RX ORDER — BUSPIRONE HYDROCHLORIDE 30 MG/1
30 TABLET ORAL 2 TIMES DAILY
Qty: 180 TABLET | Refills: 1 | Status: SHIPPED | OUTPATIENT
Start: 2020-07-22 | End: 2020-11-17

## 2020-07-22 ASSESSMENT — PAIN SCALES - GENERAL: PAINLEVEL: NO PAIN (0)

## 2020-07-22 NOTE — RESULT ENCOUNTER NOTE
Dear Carlos,    Your recent test results are attached.      Good cholesterol.    If you have any questions please feel free to contact [phone] or myself via [employer].    Sincerely,  Laurita Gold, CNP

## 2020-07-22 NOTE — LETTER
July 23, 2020      Carlos Faith  [address]   [place] [address]          Dear Marcell,    We are writing to inform you of your test results.  Good cholesterol.       Resulted Orders   Lipid panel reflex to direct LDL Fasting   Result Value Ref Range    Cholesterol 176 <200 mg/dL    Triglycerides 84 <150 mg/dL      Comment:      Fasting specimen    HDL Cholesterol 69 >39 mg/dL    LDL Cholesterol Calculated 90 <100 mg/dL      Comment:      Desirable:       <100 mg/dl    Non HDL Cholesterol 107 <130 mg/dL       If you have any questions or concerns, please call the clinic at the number listed above.       Sincerely,        FLORENTINO Rutherford CNP

## 2020-07-23 ENCOUNTER — THERAPY VISIT (OUTPATIENT)
Dept: PHYSICAL THERAPY | Facility: CLINIC | Age: 85
End: 2020-07-23
Payer: MEDICARE

## 2020-07-23 DIAGNOSIS — M25.561 CHRONIC PAIN OF RIGHT KNEE: ICD-10-CM

## 2020-07-23 DIAGNOSIS — G89.29 CHRONIC PAIN OF RIGHT KNEE: ICD-10-CM

## 2020-07-23 PROCEDURE — 97530 THERAPEUTIC ACTIVITIES: CPT | Mod: GP | Performed by: PHYSICAL THERAPIST

## 2020-07-23 PROCEDURE — 97110 THERAPEUTIC EXERCISES: CPT | Mod: GP | Performed by: PHYSICAL THERAPIST

## 2020-07-23 NOTE — PROGRESS NOTES
Subjective:      Physical Exam                    Objective:  System    Physical Exam    General     ROS    Assessment/Plan:    DISCHARGE REPORT    Progress reporting period is from 7/10/2020 to 7/23/2020.       SUBJECTIVE  Subjective changes noted by patient:   Subjective: Patient reports no complaints today. patient reports having knee pain 1 time in the last 5 days and it occured while walking >0.5 miles and resolves immedialty with a quick rest break and did not return for the remainder of the walk. Patient reports not playing pickleball recently due to the heat, but plans to resume that activity as the weather improves. Patient wants to discharge from PT today with a HEP and education on bracing options should his knee pain worsen in the future    Current pain level is  Current Pain level: 0/10.     Previous pain level was   Initial Pain level: 2/10.   Changes in function:  Yes (See Goal flowsheet attached for changes in current functional level)  Adverse reaction to treatment or activity: None    OBJECTIVE  Changes noted in objective findings:  Yes,   Objective: Right knee AROM 0-136 deg, pain free thoughout. Right knee strength grossly 4+/5 with a good quad set. Moderate genu varus bilaterally and tenderness over the medial joint line bilaterally     ASSESSMENT/PLAN  Updated problem list and treatment plan: Diagnosis 1:  Right knee pain  Pain -    STG/LTGs have been met or progress has been made towards goals:  Yes (See Goal flow sheet completed today.)  Assessment of Progress: The patient's condition is improving.  The patient has met all of their long term goals.  Self Management Plans:  Patient is independent in a home treatment program.  Patient is independent in self management of symptoms.  I have re-evaluated this patient and find that the nature, scope, duration and intensity of the therapy is appropriate for the medical condition of the patient.  Carlos continues to require the following intervention  to meet STG and LTG's:  PT intervention is no longer required to meet STG/LTG.    Recommendations:  This patient is ready to be discharged from therapy and continue their home treatment program.    Please refer to the daily flowsheet for treatment today, total treatment time and time spent performing 1:1 timed codes.

## 2020-07-27 DIAGNOSIS — E78.2 MIXED HYPERLIPIDEMIA: ICD-10-CM

## 2020-07-27 NOTE — TELEPHONE ENCOUNTER
Spoke to patient and he is now using [facility]. He will contact them to have current rx's transferred over.  Lacy SCHNEIDER CMA ([facility])

## 2020-07-28 RX ORDER — SIMVASTATIN 10 MG
TABLET ORAL
Qty: 90 TABLET | Refills: 1 | Status: SHIPPED | OUTPATIENT
Start: 2020-07-28 | End: 2020-11-17

## 2020-07-29 DIAGNOSIS — F32.1 MODERATE SINGLE CURRENT EPISODE OF MAJOR DEPRESSIVE DISORDER (H): ICD-10-CM

## 2020-07-29 NOTE — TELEPHONE ENCOUNTER
Patient has active rx with [facility]. Spoke to patient and he is now using [facility] in [place].  Lacy SCHNEIDER CMA ([facility])

## 2020-07-31 DIAGNOSIS — F32.1 MODERATE SINGLE CURRENT EPISODE OF MAJOR DEPRESSIVE DISORDER (H): ICD-10-CM

## 2020-08-03 NOTE — TELEPHONE ENCOUNTER
Reason for call:  Medication   If this is a refill request, has the caller requested the refill from the pharmacy already? No  Will the patient be using a [facility]? No  Name of the pharmacy and phone number for the current request: [facility] [phone]    Name of the medication requested: Amitriptyline     Other request: 90 day refill     Phone number to reach patient:  Home number on file [phone] (home)    Best Time:  Any     Can we leave a detailed message on this number?  YES    Travel screening: Not Applicable

## 2020-08-24 DIAGNOSIS — F41.1 GAD (GENERALIZED ANXIETY DISORDER): ICD-10-CM

## 2020-08-24 NOTE — TELEPHONE ENCOUNTER
Patient has active rx with [facility] pharmacy. Spoke to patient and he stated he is using [facility] in [place].  Lacy SCHNEIDER CMA ([facility])

## 2020-08-25 DIAGNOSIS — F41.1 GAD (GENERALIZED ANXIETY DISORDER): ICD-10-CM

## 2020-08-25 RX ORDER — MIRTAZAPINE 45 MG/1
TABLET, FILM COATED ORAL
Qty: 90 TABLET | Refills: 1 | OUTPATIENT
Start: 2020-08-25

## 2020-08-25 RX ORDER — MIRTAZAPINE 45 MG/1
45 TABLET, FILM COATED ORAL AT BEDTIME
Qty: 90 TABLET | Refills: 1 | Status: SHIPPED | OUTPATIENT
Start: 2020-08-25 | End: 2020-10-15

## 2020-10-05 DIAGNOSIS — F32.1 MODERATE SINGLE CURRENT EPISODE OF MAJOR DEPRESSIVE DISORDER (H): ICD-10-CM

## 2020-10-05 NOTE — TELEPHONE ENCOUNTER
Patient calling is running low on his amitriptyline 25 mg, would like a 90 day refill sent to pharmacy for this please.

## 2020-10-14 ENCOUNTER — TELEPHONE (OUTPATIENT)
Dept: FAMILY MEDICINE | Facility: CLINIC | Age: 85
End: 2020-10-14

## 2020-10-14 DIAGNOSIS — F41.1 GAD (GENERALIZED ANXIETY DISORDER): ICD-10-CM

## 2020-10-14 DIAGNOSIS — F32.1 MODERATE SINGLE CURRENT EPISODE OF MAJOR DEPRESSIVE DISORDER (H): ICD-10-CM

## 2020-10-14 NOTE — TELEPHONE ENCOUNTER
Per patient, he was on amitriptyline 25mg nightly but Laurita Gold NP decreased it to 1/2 tab (12.5 mg) night due to possible side effects(see 7/9/2020 phone encounter)  Also a risk due to age and potential for serotonin syndrome noted (see 7/7/2020 OV notes)    However, 12.5 mg was not effective so he has been taking 25 mg nightly for months without any side effects  He would like to go back to 25 mg nightly and get 90 day supply     Patient understands that Laurita Gold NP is not in clinic and another provider may or may not approve this    Jania Mckenna RN

## 2020-10-15 RX ORDER — MIRTAZAPINE 30 MG/1
30 TABLET, FILM COATED ORAL AT BEDTIME
Qty: 90 TABLET | Refills: 0
Start: 2020-10-15 | End: 2020-11-17

## 2020-10-15 NOTE — TELEPHONE ENCOUNTER
Per Dr. Burrows: ok to refill amitriptyline 25 mg at HS. But per Laurita Gold NP's OV from 7/22/2020, patient was to try to decrease Mirtazapine to 30 mg night to limit risk of serotonin syndrome. Did he do this? If not, please advise him to do so    Detailed message left on patient's VM with updates that prescription for Amitriptyline 25 mg nightly was refilled and asked that he call back to very whether or not he has decreased the mirtazapine to 30 mg nightly    Jania Mckenna RN

## 2020-10-15 NOTE — TELEPHONE ENCOUNTER
Patient returned call, he is aware he is supposed to decrease the Mirtazapine and will start this on Mon.  He has supply of medication and does not need prescription sent to pharmacy, updated prescription. Patient aware Amitriptyline was sent to pharmacy.   Suri Carballo RN

## 2020-11-17 ENCOUNTER — OFFICE VISIT (OUTPATIENT)
Dept: FAMILY MEDICINE | Facility: CLINIC | Age: 85
End: 2020-11-17
Payer: MEDICARE

## 2020-11-17 VITALS
SYSTOLIC BLOOD PRESSURE: 130 MMHG | HEIGHT: 70 IN | BODY MASS INDEX: 20.47 KG/M2 | OXYGEN SATURATION: 99 % | DIASTOLIC BLOOD PRESSURE: 74 MMHG | WEIGHT: 143 LBS | HEART RATE: 74 BPM

## 2020-11-17 DIAGNOSIS — H61.22 IMPACTED CERUMEN OF LEFT EAR: ICD-10-CM

## 2020-11-17 DIAGNOSIS — Z23 NEED FOR INFLUENZA VACCINATION: ICD-10-CM

## 2020-11-17 DIAGNOSIS — F41.1 GAD (GENERALIZED ANXIETY DISORDER): ICD-10-CM

## 2020-11-17 DIAGNOSIS — G47.00 INSOMNIA, UNSPECIFIED TYPE: ICD-10-CM

## 2020-11-17 DIAGNOSIS — E78.2 MIXED HYPERLIPIDEMIA: ICD-10-CM

## 2020-11-17 DIAGNOSIS — R91.8 PULMONARY NODULES: ICD-10-CM

## 2020-11-17 DIAGNOSIS — K44.9 HIATAL HERNIA: ICD-10-CM

## 2020-11-17 DIAGNOSIS — Z00.00 ENCOUNTER FOR MEDICARE ANNUAL WELLNESS EXAM: Primary | ICD-10-CM

## 2020-11-17 DIAGNOSIS — K21.00 GASTROESOPHAGEAL REFLUX DISEASE WITH ESOPHAGITIS WITHOUT HEMORRHAGE: ICD-10-CM

## 2020-11-17 DIAGNOSIS — Z12.5 SCREENING PSA (PROSTATE SPECIFIC ANTIGEN): ICD-10-CM

## 2020-11-17 LAB
BASOPHILS # BLD AUTO: 0 10E9/L (ref 0–0.2)
BASOPHILS NFR BLD AUTO: 0.4 %
DIFFERENTIAL METHOD BLD: NORMAL
EOSINOPHIL # BLD AUTO: 0.2 10E9/L (ref 0–0.7)
EOSINOPHIL NFR BLD AUTO: 3.4 %
ERYTHROCYTE [DISTWIDTH] IN BLOOD BY AUTOMATED COUNT: 14.2 % (ref 10–15)
HCT VFR BLD AUTO: 46.9 % (ref 40–53)
HGB BLD-MCNC: 15.9 G/DL (ref 13.3–17.7)
LYMPHOCYTES # BLD AUTO: 0.9 10E9/L (ref 0.8–5.3)
LYMPHOCYTES NFR BLD AUTO: 17.5 %
MCH RBC QN AUTO: 31.3 PG (ref 26.5–33)
MCHC RBC AUTO-ENTMCNC: 33.9 G/DL (ref 31.5–36.5)
MCV RBC AUTO: 92 FL (ref 78–100)
MONOCYTES # BLD AUTO: 0.6 10E9/L (ref 0–1.3)
MONOCYTES NFR BLD AUTO: 11.5 %
NEUTROPHILS # BLD AUTO: 3.4 10E9/L (ref 1.6–8.3)
NEUTROPHILS NFR BLD AUTO: 67.2 %
PLATELET # BLD AUTO: 195 10E9/L (ref 150–450)
RBC # BLD AUTO: 5.08 10E12/L (ref 4.4–5.9)
WBC # BLD AUTO: 5 10E9/L (ref 4–11)

## 2020-11-17 PROCEDURE — 85025 COMPLETE CBC W/AUTO DIFF WBC: CPT | Performed by: FAMILY MEDICINE

## 2020-11-17 PROCEDURE — 99214 OFFICE O/P EST MOD 30 MIN: CPT | Mod: 25 | Performed by: FAMILY MEDICINE

## 2020-11-17 PROCEDURE — G0439 PPPS, SUBSEQ VISIT: HCPCS | Performed by: FAMILY MEDICINE

## 2020-11-17 PROCEDURE — 80061 LIPID PANEL: CPT | Performed by: FAMILY MEDICINE

## 2020-11-17 PROCEDURE — 36415 COLL VENOUS BLD VENIPUNCTURE: CPT | Performed by: FAMILY MEDICINE

## 2020-11-17 PROCEDURE — 69209 REMOVE IMPACTED EAR WAX UNI: CPT | Performed by: FAMILY MEDICINE

## 2020-11-17 PROCEDURE — 80053 COMPREHEN METABOLIC PANEL: CPT | Performed by: FAMILY MEDICINE

## 2020-11-17 PROCEDURE — G0103 PSA SCREENING: HCPCS | Performed by: FAMILY MEDICINE

## 2020-11-17 PROCEDURE — 84134 ASSAY OF PREALBUMIN: CPT | Performed by: FAMILY MEDICINE

## 2020-11-17 RX ORDER — BUSPIRONE HYDROCHLORIDE 30 MG/1
30 TABLET ORAL 2 TIMES DAILY
Qty: 180 TABLET | Refills: 1 | Status: SHIPPED | OUTPATIENT
Start: 2020-11-17 | End: 2021-01-22

## 2020-11-17 RX ORDER — MIRTAZAPINE 30 MG/1
30 TABLET, FILM COATED ORAL AT BEDTIME
Qty: 90 TABLET | Refills: 0 | Status: SHIPPED | OUTPATIENT
Start: 2020-11-17 | End: 2021-01-22

## 2020-11-17 RX ORDER — SIMVASTATIN 10 MG
TABLET ORAL
Qty: 90 TABLET | Refills: 1 | Status: SHIPPED | OUTPATIENT
Start: 2020-11-17 | End: 2021-07-28

## 2020-11-17 ASSESSMENT — MIFFLIN-ST. JEOR: SCORE: 1339.89

## 2020-11-17 NOTE — LETTER
[facility]  [address] [id number] [phone]                                                                                                November 23, 2020    Carlos Faith  [address]   [place] [address]        Dear Mr. Faith,    Your recent lab results were within normal limits. A copy of those results are included with this letter.        Sincerely,      Antoinette Bagley DO/hl    Results for orders placed or performed in visit on 11/17/20   Lipid panel reflex to direct LDL Fasting     Status: Abnormal   Result Value Ref Range    Cholesterol 220 (H) <200 mg/dL    Triglycerides 106 <150 mg/dL    HDL Cholesterol 64 >39 mg/dL    LDL Cholesterol Calculated 135 (H) <100 mg/dL    Non HDL Cholesterol 156 (H) <130 mg/dL   Comprehensive metabolic panel     Status: Abnormal   Result Value Ref Range    Sodium 138 133 - 144 mmol/L    Potassium 4.1 3.4 - 5.3 mmol/L    Chloride 106 94 - 109 mmol/L    Carbon Dioxide 27 20 - 32 mmol/L    Anion Gap 5 3 - 14 mmol/L    Glucose 110 (H) 70 - 99 mg/dL    Urea Nitrogen 24 7 - 30 mg/dL    Creatinine 1.18 0.66 - 1.25 mg/dL    GFR Estimate 56 (L) >60 mL/min/[1.73_m2]    GFR Estimate If Black 64 >60 mL/min/[1.73_m2]    Calcium 9.5 8.5 - 10.1 mg/dL    Bilirubin Total 0.5 0.2 - 1.3 mg/dL    Albumin 3.8 3.4 - 5.0 g/dL    Protein Total 7.3 6.8 - 8.8 g/dL    Alkaline Phosphatase 112 40 - 150 U/L    ALT 24 0 - 70 U/L    AST 21 0 - 45 U/L   CBC with platelets and differential     Status: None   Result Value Ref Range    WBC 5.0 4.0 - 11.0 10e9/L    RBC Count 5.08 4.4 - 5.9 10e12/L    Hemoglobin 15.9 13.3 - 17.7 g/dL    Hematocrit 46.9 40.0 - 53.0 %    MCV 92 78 - 100 fl    MCH 31.3 26.5 - 33.0 pg    MCHC 33.9 31.5 - 36.5 g/dL    RDW 14.2 10.0 - 15.0 %    Platelet Count 195 150 - 450 10e9/L    % Neutrophils 67.2 %    % Lymphocytes 17.5 %    % Monocytes 11.5 %    % Eosinophils 3.4 %    % Basophils 0.4 %     Absolute Neutrophil 3.4 1.6 - 8.3 10e9/L    Absolute Lymphocytes 0.9 0.8 - 5.3 10e9/L    Absolute Monocytes 0.6 0.0 - 1.3 10e9/L    Absolute Eosinophils 0.2 0.0 - 0.7 10e9/L    Absolute Basophils 0.0 0.0 - 0.2 10e9/L    Diff Method Automated Method    PSA, screen     Status: None   Result Value Ref Range    PSA <0.01 0 - 4 ug/L   Prealbumin     Status: None   Result Value Ref Range    Prealbumin 37 15 - 45 mg/dL

## 2020-11-17 NOTE — PATIENT INSTRUCTIONS
Trial of cutting Remeron in 1/2 now  Continue all other meds  CT scan in 3/2121  Continue all other meds  Follow up virtually (appoint in 1-2 months)  Labs today      Patient Education   Personalized Prevention Plan  You are due for the preventive services outlined below.  Your care team is available to assist you in scheduling these services.  If you have already completed any of these items, please share that information with your care team to update in your medical record.  Health Maintenance Due   Topic Date Due     Diptheria Tetanus Pertussis (DTAP/TDAP/TD) Vaccine (1 - Tdap) 02/12/1960     Flu Vaccine (1) 09/01/2020

## 2020-11-17 NOTE — NURSING NOTE
Patient identified using two patient identifiers.  Ear exam showing wax occlusion completed by provider.  Solution: warm water was placed in the left ear(s) via irrigation tool: henrietta Salinas MA

## 2020-11-17 NOTE — PROGRESS NOTES
"  SUBJECTIVE:   Carlos Faith is a 85 year old male who presents for Preventive Visit.      Patient is fasting for labs today.   Patient has been advised of split billing requirements and indicates understanding: Yes  Are you in the first 12 months of your Medicare Part B coverage?  No    Physical Health:    In general, how would you rate your overall physical health? good    Outside of work, how many days during the week do you exercise? Walking in aroartment, normally plays pickleball but they are shut down. Some floor exercises also.     Outside of work, approximately how many minutes a day do you exercise?15-30 minutes    If you drink alcohol do you typically have >3 drinks per day or >7 drinks per week? No    Do you usually eat at least 4 servings of fruit and vegetables a day, include whole grains & fiber and avoid regularly eating high fat or \"junk\" foods? NOT Quite     Do you have any problems taking medications regularly?  No    Do you have any side effects from medications? none    Needs assistance for the following daily activities: no assistance needed    Which of the following safety concerns are present in your home?  none identified     Hearing impairment: not really sure    In the past 6 months, have you been bothered by leaking of urine? Had prostate cancer surgery at 58, wears pads    Mental Health:    In general, how would you rate your overall mental or emotional health? good  PHQ-2 Score:      Do you feel safe in your environment? Yes    Have you ever done Advance Care Planning? (For example, a Health Directive, POLST, or a discussion with a medical provider or your loved ones about your wishes): has at homer     Additional concerns to address?      Fall risk:  Fallen 2 or more times in the past year?: No  Any fall with injury in the past year?: No     Cognitive Screenin) Repeat 3 items (Leader, Season, Table)    2) Clock draw:   3) 3 item recall: Recalls 3 objects  Results: 3 items " recalled: COGNITIVE IMPAIRMENT LESS LIKELY    Mini-CogTM Copyright S Zfeerino. Licensed by the author for use in [facility] [place]; reprinted with permission (julius@[email or web address]). All rights reserved.      Do you have sleep apnea, excessive snoring or daytime drowsiness?: no        CT-                                                                 IMPRESSION:    1. No cavitary lesion is visualized. There is linear scarring versus  atelectasis in the anterior lingula, which may correspond to the site  of previously described pathology. Recommend correlation with previous  outside imaging.  2. Small solid nodule in the left apex. Consider follow-up CT in one  year if the patient is considered high risk for lung cancer.  3. Moderate to large fat-containing right Bochdalek hernia.  4. Small to moderate hiatal hernia.  5. Advanced atherosclerotic calcifications throughout, including the  coronary arteries and the origins of the renal arteries.        [Consider Follow Up: Lung nodule]     This report will be copied to the [facility] to ensure a  provider acknowledges the finding.            AGNIESZKA CORRALES MD    No chest pain no heart burn , PPI takes every day  Exercising well without any sx    Anxiety=stable, takes it daily    Depression/sleep remeron for sleep with amitryptyline -works we;;    Sleep has been an issue but reports that meds working    Hiatal hernia history , seen on CT-no isues with recurring sx since taking PPI    Lung nodule , noted on CT-will recheck in march again to make sure it is stable, declined to do nodule clinic for now  No sx, no issues with cough, weight loss or fatigue, no real history of smoking      Hearing muffled in one year, history of wax , otherwise does well          Reviewed and updated as needed this visit by clinical staff  Tobacco  Allergies  Meds   Med Hx  Surg Hx  Fam Hx  Soc Hx        Reviewed and updated as needed this visit by Provider                Social  "History     Tobacco Use     Smoking status: Never Smoker     Smokeless tobacco: Never Used   Substance Use Topics     Alcohol use: Yes     Alcohol/week: 0.0 standard drinks     Comment: Wine                           Current providers sharing in care for this patient include:   Patient Care Team:  Laurita Gold APRN CNP as PCP - General (Nurse Practitioner - Family)  Laurita Gold APRN CNP as Assigned PCP    The following health maintenance items are reviewed in Epic and correct as of today:  Health Maintenance   Topic Date Due     DTAP/TDAP/TD IMMUNIZATION (1 - Tdap) 02/12/1960     PHQ-9  01/07/2021     FALL RISK ASSESSMENT  07/07/2021     MEDICARE ANNUAL WELLNESS VISIT  11/17/2021     LIPID  11/17/2021     ADVANCE CARE PLANNING  10/23/2024     DEPRESSION ACTION PLAN  Completed     INFLUENZA VACCINE  Completed     Pneumococcal Vaccine: 65+ Years  Completed     ZOSTER IMMUNIZATION  Completed     Pneumococcal Vaccine: Pediatrics (0 to 5 Years) and At-Risk Patients (6 to 64 Years)  Aged Out     IPV IMMUNIZATION  Aged Out     MENINGITIS IMMUNIZATION  Aged Out     HEPATITIS B IMMUNIZATION  Aged Out     Labs reviewed in EPIC  BP Readings from Last 3 Encounters:   11/17/20 130/74   07/22/20 124/76   07/07/20 106/64    Wt Readings from Last 3 Encounters:   11/17/20 64.9 kg (143 lb)   07/22/20 62.6 kg (138 lb)   07/07/20 63.1 kg (139 lb 3.2 oz)                  Pneumonia Vaccine:Adults age 65+ who received Pneumovax (PPSV23) at 65 years or older: Should be given PCV13 > 1 year after their most recent PPSV23    ROS:  Constitutional, HEENT, cardiovascular, pulmonary, GI, , musculoskeletal, neuro, skin, endocrine and psych systems are negative, except as otherwise noted.    OBJECTIVE:   /74   Pulse 74   Ht 1.778 m (5' 10\")   Wt 64.9 kg (143 lb)   SpO2 99%   BMI 20.52 kg/m   Estimated body mass index is 20.52 kg/m  as calculated from the following:    Height as of this encounter: 1.778 m (5' " "10\").    Weight as of this encounter: 64.9 kg (143 lb).  EXAM:   GENERAL: healthy, alert and no distress  EYES: Eyes grossly normal to inspection, PERRL and conjunctivae and sclerae normal  HENT:left side ear canal -cerumen impacted, could not be easily removed with curette, additional effort was required to attempt curette removal and allow for irrigation    and TM's normal, nose and mouth without ulcers or lesions  NECK: no adenopathy, no asymmetry, masses, or scars and thyroid normal to palpation  RESP: lungs clear to auscultation - no rales, rhonchi or wheezes  BREAST: normal without masses, tenderness or nipple discharge and no palpable axillary masses or adenopathy  CV: regular rate and rhythm, normal S1 S2, no S3 or S4, no murmur, click or rub, no peripheral edema and peripheral pulses strong  ABDOMEN: soft, nontender, no hepatosplenomegaly, no masses and bowel sounds normal  RECTAL: deferred  MS: no gross musculoskeletal defects noted, no edema  SKIN: no suspicious lesions or rashes  NEURO: Normal strength and tone, mentation intact and speech normal  PSYCH: mentation appears normal, affect normal/bright    Diagnostic Test Results:  Labs reviewed in Epic    ASSESSMENT / PLAN:       ICD-10-CM    1. Encounter for Medicare annual wellness exam  Z00.00 Prealbumin   2. KANDI (generalized anxiety disorder)  F41.1 busPIRone HCl (BUSPAR) 30 MG tablet     mirtazapine (REMERON) 30 MG tablet     OFFICE/OUTPT VISIT,EST,LEVL IV   3. Mixed hyperlipidemia  E78.2 simvastatin (ZOCOR) 10 MG tablet     Lipid panel reflex to direct LDL Fasting     OFFICE/OUTPT VISIT,EST,LEVL IV   4. Need for influenza vaccination  Z23 CANCELED: FLUZONE HIGH DOSE 65+  [09912]   5. Gastroesophageal reflux disease with esophagitis without hemorrhage  K21.00 OFFICE/OUTPT VISIT,EST,LEVL IV   6. Hiatal hernia  K44.9 Comprehensive metabolic panel     CBC with platelets and differential     OFFICE/OUTPT VISIT,EST,LEVL IV   7. Screening PSA (prostate " "specific antigen)  Z12.5 PSA, screen   8. Impacted cerumen of left ear  H61.22 REMOVE IMPACTED CERUMEN     OFFICE/OUTPT VISIT,EST,LEVL IV   9. Insomnia, unspecified type  G47.00 OFFICE/OUTPT VISIT,EST,LEVL IV   10. Pulmonary nodules  R91.8 CT Chest w/o Contrast     Andre-stable on current meds  Insomnia-he reports that his other doctor added remeron and is doing better, but risk of serotonin syndrome reviewed, Trial of cutting Remeron in 1/2 now  Continue all other meds    Lung nodue-declined nodule clinic, CT scan in 3/2121  Continue all other meds  Follow up virtually (appoint in 1-2 months)  Labs today    Hiatal hernia-stable, no gerd sx now on PPI    History of prostate ca-check psa, no sx    Left ear muffled, removed wax    Patient has been advised of split billing requirements and indicates understanding: Yes    COUNSELING:  Reviewed preventive health counseling, as reflected in patient instructions    Estimated body mass index is 20.52 kg/m  as calculated from the following:    Height as of this encounter: 1.778 m (5' 10\").    Weight as of this encounter: 64.9 kg (143 lb).    Weight management plan pre-albumin level obtained    He reports that he has never smoked. He has never used smokeless tobacco.    Appropriate preventive services were discussed with this patient, including applicable screening as appropriate for cardiovascular disease, diabetes, osteopenia/osteoporosis, and glaucoma.  As appropriate for age/gender, discussed screening for colorectal cancer, prostate cancer, breast cancer, and cervical cancer. Checklist reviewing preventive services available has been given to the patient.    Reviewed patients plan of care and provided an AVS. The Intermediate Care Plan ( asthma action plan, low back pain action plan, and migraine action plan) for Carlos meets the Care Plan requirement. This Care Plan has been established and reviewed with the Patient.    Counseling Resources:  ATP IV Guidelines  Pooled " Cohorts Equation Calculator  Breast Cancer Risk Calculator  BRCA-Related Cancer Risk Assessment: FHS-7 Tool  FRAX Risk Assessment  ICSI Preventive Guidelines  Dietary Guidelines for Americans, 2010  USDA's MyPlate  ASA Prophylaxis  Lung CA Screening    DO DEBBI Pop [facility]

## 2020-11-18 LAB
ALBUMIN SERPL-MCNC: 3.8 G/DL (ref 3.4–5)
ALP SERPL-CCNC: 112 U/L (ref 40–150)
ALT SERPL W P-5'-P-CCNC: 24 U/L (ref 0–70)
ANION GAP SERPL CALCULATED.3IONS-SCNC: 5 MMOL/L (ref 3–14)
AST SERPL W P-5'-P-CCNC: 21 U/L (ref 0–45)
BILIRUB SERPL-MCNC: 0.5 MG/DL (ref 0.2–1.3)
BUN SERPL-MCNC: 24 MG/DL (ref 7–30)
CALCIUM SERPL-MCNC: 9.5 MG/DL (ref 8.5–10.1)
CHLORIDE SERPL-SCNC: 106 MMOL/L (ref 94–109)
CHOLEST SERPL-MCNC: 220 MG/DL
CO2 SERPL-SCNC: 27 MMOL/L (ref 20–32)
CREAT SERPL-MCNC: 1.18 MG/DL (ref 0.66–1.25)
GFR SERPL CREATININE-BSD FRML MDRD: 56 ML/MIN/{1.73_M2}
GLUCOSE SERPL-MCNC: 110 MG/DL (ref 70–99)
HDLC SERPL-MCNC: 64 MG/DL
LDLC SERPL CALC-MCNC: 135 MG/DL
NONHDLC SERPL-MCNC: 156 MG/DL
POTASSIUM SERPL-SCNC: 4.1 MMOL/L (ref 3.4–5.3)
PREALB SERPL IA-MCNC: 37 MG/DL (ref 15–45)
PROT SERPL-MCNC: 7.3 G/DL (ref 6.8–8.8)
PSA SERPL-ACNC: <0.01 UG/L (ref 0–4)
SODIUM SERPL-SCNC: 138 MMOL/L (ref 133–144)
TRIGL SERPL-MCNC: 106 MG/DL

## 2021-01-02 DIAGNOSIS — F32.1 MODERATE SINGLE CURRENT EPISODE OF MAJOR DEPRESSIVE DISORDER (H): ICD-10-CM

## 2021-01-05 NOTE — TELEPHONE ENCOUNTER
Routing refill request to provider for review/approval because:  Drug interaction warning    Bella Paula RN, BSN, PHN  [facility]: [place]

## 2021-01-21 NOTE — PROGRESS NOTES
Assessment & Plan     KANDI (generalized anxiety disorder)  Stable.  Continue current treatment plan and medications.   - busPIRone HCl (BUSPAR) 30 MG tablet; Take 1 tablet (30 mg) by mouth daily  - mirtazapine (REMERON) 30 MG tablet; Take 1 tablet (30 mg) by mouth At Bedtime    Moderate single current episode of major depressive disorder (H)  Stable.  Continue current treatment plan and medications.   - amitriptyline (ELAVIL) 25 MG tablet; Take 1 tablet (25 mg) by mouth At Bedtime    Stage 3a chronic kidney disease  Noted to chart and explained to patient.  Continue to monitor.                               Return in about 6 months (around 7/22/2021) for Medication Recheck.    FLORENTINO Rutherford CNP  [facility] [place] Gonzalez is a 85 year old who presents to clinic today for the following health issues     HPI       Hyperlipidemia Follow-Up      Are you regularly taking any medication or supplement to lower your cholesterol?   Yes- simvastatin (ZOCOR) 10 MG tablet    Are you having muscle aches or other side effects that you think could be caused by your cholesterol lowering medication?  No    Depression and Anxiety Follow-Up    How are you doing with your depression since your last visit? Improved     How are you doing with your anxiety since your last visit?  Improved     Are you having other symptoms that might be associated with depression or anxiety? No    Have you had a significant life event? Growth in lung.     Do you have any concerns with your use of alcohol or other drugs? No    Social History     Tobacco Use     Smoking status: Never Smoker     Smokeless tobacco: Never Used   Substance Use Topics     Alcohol use: Yes     Alcohol/week: 0.0 standard drinks     Comment: Wine     Drug use: No     PHQ 4/24/2019 10/23/2019 7/7/2020   PHQ-9 Total Score 0 0 15   Q9: Thoughts of better off dead/self-harm past 2 weeks Not at all Not at all Several days     KANDI-7 SCORE 1/4/2018  "7/16/2018 7/7/2020   Total Score 10 0 12     Patient notes that he decreased his buspirone to once daily, due to not feeling he needs it at bedtime.  He notes that he is sleeping well.  He denies side effects from amitriptyline and remeron.  He denies constipation, urination issues.      Suicide Assessment Five-step Evaluation and Treatment (SAFE-T)      How many servings of fruits and vegetables do you eat daily?  1-2    On average, how many sweetened beverages do you drink each day (Examples: soda, juice, sweet tea, etc.  Do NOT count diet or artificially sweetened beverages)?   1    How many days per week do you exercise enough to make your heart beat faster? 7    How many minutes a day do you exercise enough to make your heart beat faster? 30 minutes    How many days per week do you miss taking your medication? 0        Review of Systems   Constitutional, HEENT, cardiovascular, pulmonary, gi and gu systems are negative, except as otherwise noted.      Objective    /82   Pulse 116   Temp 97.9  F (36.6  C) (Oral)   Ht 1.729 m (5' 8.07\")   Wt 67 kg (147 lb 9.6 oz)   SpO2 97%   BMI 22.40 kg/m    Body mass index is 22.4 kg/m .  Physical Exam   GENERAL: healthy, alert and no distress  RESP: lungs clear to auscultation - no rales, rhonchi or wheezes  CV: regular rate and rhythm, normal S1 S2, no S3 or S4, no murmur, click or rub, no peripheral edema and peripheral pulses strong  MS: no gross musculoskeletal defects noted, no edema  PSYCH: mentation appears normal, affect normal/bright                  "

## 2021-01-22 ENCOUNTER — OFFICE VISIT (OUTPATIENT)
Dept: FAMILY MEDICINE | Facility: CLINIC | Age: 86
End: 2021-01-22
Payer: MEDICARE

## 2021-01-22 VITALS
HEART RATE: 116 BPM | OXYGEN SATURATION: 97 % | TEMPERATURE: 97.9 F | BODY MASS INDEX: 22.37 KG/M2 | WEIGHT: 147.6 LBS | HEIGHT: 68 IN | DIASTOLIC BLOOD PRESSURE: 82 MMHG | SYSTOLIC BLOOD PRESSURE: 138 MMHG

## 2021-01-22 DIAGNOSIS — F41.1 GAD (GENERALIZED ANXIETY DISORDER): ICD-10-CM

## 2021-01-22 DIAGNOSIS — N18.31 STAGE 3A CHRONIC KIDNEY DISEASE (H): ICD-10-CM

## 2021-01-22 DIAGNOSIS — F32.1 MODERATE SINGLE CURRENT EPISODE OF MAJOR DEPRESSIVE DISORDER (H): ICD-10-CM

## 2021-01-22 PROBLEM — N18.30 CHRONIC KIDNEY DISEASE, STAGE 3 (H): Status: ACTIVE | Noted: 2021-01-22

## 2021-01-22 PROCEDURE — 99214 OFFICE O/P EST MOD 30 MIN: CPT | Performed by: NURSE PRACTITIONER

## 2021-01-22 RX ORDER — UBIDECARENONE 100 MG
CAPSULE ORAL DAILY
COMMUNITY

## 2021-01-22 RX ORDER — BUSPIRONE HYDROCHLORIDE 30 MG/1
30 TABLET ORAL DAILY
Qty: 90 TABLET | Refills: 1 | Status: SHIPPED | OUTPATIENT
Start: 2021-01-22 | End: 2021-08-20

## 2021-01-22 RX ORDER — MIRTAZAPINE 30 MG/1
30 TABLET, FILM COATED ORAL AT BEDTIME
Qty: 90 TABLET | Refills: 1 | Status: SHIPPED | OUTPATIENT
Start: 2021-01-22 | End: 2021-09-27

## 2021-01-22 ASSESSMENT — PATIENT HEALTH QUESTIONNAIRE - PHQ9: SUM OF ALL RESPONSES TO PHQ QUESTIONS 1-9: 0

## 2021-01-22 ASSESSMENT — PAIN SCALES - GENERAL: PAINLEVEL: NO PAIN (0)

## 2021-01-22 ASSESSMENT — MIFFLIN-ST. JEOR: SCORE: 1330.14

## 2021-02-08 DIAGNOSIS — K21.00 GASTROESOPHAGEAL REFLUX DISEASE WITH ESOPHAGITIS: ICD-10-CM

## 2021-02-08 DIAGNOSIS — K21.9 GASTROESOPHAGEAL REFLUX DISEASE WITHOUT ESOPHAGITIS: ICD-10-CM

## 2021-02-08 DIAGNOSIS — K21.00 GASTROESOPHAGEAL REFLUX DISEASE WITH ESOPHAGITIS WITHOUT HEMORRHAGE: Primary | ICD-10-CM

## 2021-02-08 RX ORDER — OMEPRAZOLE 40 MG/1
CAPSULE, DELAYED RELEASE ORAL
Qty: 90 CAPSULE | Refills: 3 | Status: SHIPPED | OUTPATIENT
Start: 2021-02-08 | End: 2022-02-02

## 2021-05-30 PROBLEM — G89.29 CHRONIC PAIN OF RIGHT KNEE: Status: RESOLVED | Noted: 2020-07-10 | Resolved: 2021-05-30

## 2021-05-30 PROBLEM — M25.561 CHRONIC PAIN OF RIGHT KNEE: Status: RESOLVED | Noted: 2020-07-10 | Resolved: 2021-05-30

## 2021-08-17 DIAGNOSIS — F41.1 GAD (GENERALIZED ANXIETY DISORDER): ICD-10-CM

## 2021-08-20 RX ORDER — BUSPIRONE HYDROCHLORIDE 30 MG/1
TABLET ORAL
Qty: 90 TABLET | Refills: 0 | Status: SHIPPED | OUTPATIENT
Start: 2021-08-20 | End: 2022-02-18

## 2021-09-09 ENCOUNTER — LAB (OUTPATIENT)
Dept: LAB | Facility: CLINIC | Age: 86
End: 2021-09-09
Attending: FAMILY MEDICINE
Payer: MEDICARE

## 2021-09-09 ENCOUNTER — VIRTUAL VISIT (OUTPATIENT)
Dept: FAMILY MEDICINE | Facility: CLINIC | Age: 86
End: 2021-09-09
Payer: MEDICARE

## 2021-09-09 DIAGNOSIS — Z20.822 EXPOSURE TO COVID-19 VIRUS: Primary | ICD-10-CM

## 2021-09-09 DIAGNOSIS — Z20.822 EXPOSURE TO COVID-19 VIRUS: ICD-10-CM

## 2021-09-09 PROCEDURE — U0003 INFECTIOUS AGENT DETECTION BY NUCLEIC ACID (DNA OR RNA); SEVERE ACUTE RESPIRATORY SYNDROME CORONAVIRUS 2 (SARS-COV-2) (CORONAVIRUS DISEASE [COVID-19]), AMPLIFIED PROBE TECHNIQUE, MAKING USE OF HIGH THROUGHPUT TECHNOLOGIES AS DESCRIBED BY CMS-2020-01-R: HCPCS

## 2021-09-09 PROCEDURE — U0005 INFEC AGEN DETEC AMPLI PROBE: HCPCS

## 2021-09-09 PROCEDURE — 99442 PR PHYSICIAN TELEPHONE EVALUATION 11-20 MIN: CPT | Performed by: FAMILY MEDICINE

## 2021-09-09 NOTE — PROGRESS NOTES
Carlos is a 86 year old who is being evaluated via a billable telephone visit.      What phone number would you like to be contacted at? [phone]  How would you like to obtain your AVS? Mail a copy    Assessment & Plan     Exposure to COVID-19 virus    The patient had 20-minute long masked exposure in a vehicle with a symptomatic Covid patient.  He is vaccinated and his last vaccine was 6 months ago.  We will do Covid testing.  He is quarantining    - Asymptomatic COVID-19 Virus (Coronavirus) by PCR Nasopharyngeal; Future      15 minutes spent on the date of the encounter doing chart review, patient visit and documentation            No follow-ups on file.    Marjorie Alves,   [facility]    Subjective   Carlos is a 86 year old who presents for the following health issues     HPI     Concern - requesting Coivd test due to exposure, no symptoms.  He drove a friend to the ER who tested positive for covid.  The friend was having symptoms of covid while in the car.  He was in the car with him for 20 minutes both were masked.  The windows were up.      The patient reports he had his Covid vaccines February 11 and March 11, 2021.  It is documented in his chart that he had a Covid vaccine January 11, 2021.        Review of Systems   Constitutional, HEENT, cardiovascular, pulmonary, gi and gu systems are negative, except as otherwise noted.      Objective           Vitals:  No vitals were obtained today due to virtual visit.    Physical Exam   healthy, alert and no distress  PSYCH: Alert and oriented times 3; coherent speech, normal   rate and volume, able to articulate logical thoughts, able   to abstract reason, no tangential thoughts, no hallucinations   or delusions  His affect is normal  RESP: No cough, no audible wheezing, able to talk in full sentences  Remainder of exam unable to be completed due to telephone visits                Phone call duration: 15 minutes

## 2021-09-10 LAB — SARS-COV-2 RNA RESP QL NAA+PROBE: NEGATIVE

## 2021-09-27 DIAGNOSIS — F41.1 GAD (GENERALIZED ANXIETY DISORDER): ICD-10-CM

## 2021-09-27 RX ORDER — MIRTAZAPINE 30 MG/1
TABLET, FILM COATED ORAL
Qty: 90 TABLET | Refills: 0 | Status: SHIPPED | OUTPATIENT
Start: 2021-09-27 | End: 2021-12-24

## 2021-09-28 DIAGNOSIS — F32.1 MODERATE SINGLE CURRENT EPISODE OF MAJOR DEPRESSIVE DISORDER (H): ICD-10-CM

## 2021-12-21 DIAGNOSIS — F32.1 MODERATE SINGLE CURRENT EPISODE OF MAJOR DEPRESSIVE DISORDER (H): ICD-10-CM

## 2021-12-21 DIAGNOSIS — F41.1 GAD (GENERALIZED ANXIETY DISORDER): ICD-10-CM

## 2021-12-23 NOTE — TELEPHONE ENCOUNTER
"Routing refill request to provider for review/approval because:  PHQ-9 not current  Patient needs to be seen because it has been more than 6 months since last office visit.  Drug interaction warning    PHQ 10/23/2019 7/7/2020 1/22/2021   PHQ-9 Total Score 0 15 0   Q9: Thoughts of better off dead/self-harm past 2 weeks Not at all Several days Not at all       Requested Prescriptions   Pending Prescriptions Disp Refills     mirtazapine (REMERON) 30 MG tablet [Pharmacy Med Name: Mirtazapine Oral Tablet 30 MG] 90 tablet 0     Sig: TAKE 1 TABLET BY MOUTH AT BEDTIME       Atypical Antidepressants Protocol Failed - 12/21/2021  3:55 PM        Failed - Patient has PHQ-9 score less than 5 in past 6 months.     Please review last PHQ-9 score.           Failed - Recent (6 mo) or future (30 days) visit within the authorizing provider's specialty     Patient had office visit in the last 6 months or has a visit in the next 30 days with authorizing provider or within the authorizing provider's specialty.  See \"Patient Info\" tab in in[facility]'s [facility], or \"Choose Columns\" in Meds & Orders section of the refill encounter.            Passed - Medication active on med list        Passed - Patient is age 18 or older           amitriptyline (ELAVIL) 25 MG tablet [Pharmacy Med Name: Amitriptyline HCl Oral Tablet 25 MG] 90 tablet 0     Sig: TAKE 1 TABLET BY MOUTH AT BEDTIME       Tricyclic Agents ( Annual appt and no PHQ9) Passed - 12/21/2021  3:55 PM        Passed - Blood Pressure under 140/90 in past 12 mos     BP Readings from Last 3 Encounters:   01/22/21 138/82   11/17/20 130/74   07/22/20 124/76                 Passed - Recent (12 mo) or future (30 days) visit within authorizing provider's specialty     Patient has had an office visit with the authorizing provider or a provider within the authorizing providers department within the previous 12 mos or has a future within next 30 days. See \"Patient Info\" tab in inbasket, or \"Choose Columns\" in " Meds & Orders section of the refill encounter.              Passed - Medication is active on med list        Passed - Patient is age 18 or older           Michelle Blanchard RN on 12/23/2021 at 3:46 PM

## 2021-12-24 RX ORDER — MIRTAZAPINE 30 MG/1
30 TABLET, FILM COATED ORAL AT BEDTIME
Qty: 90 TABLET | Refills: 0 | Status: SHIPPED | OUTPATIENT
Start: 2021-12-24 | End: 2022-03-30

## 2022-01-31 DIAGNOSIS — K21.9 GASTROESOPHAGEAL REFLUX DISEASE WITHOUT ESOPHAGITIS: ICD-10-CM

## 2022-02-02 RX ORDER — OMEPRAZOLE 40 MG/1
40 CAPSULE, DELAYED RELEASE ORAL DAILY
Qty: 90 CAPSULE | Refills: 0 | Status: SHIPPED | OUTPATIENT
Start: 2022-02-02 | End: 2022-04-28

## 2022-02-17 DIAGNOSIS — F41.1 GAD (GENERALIZED ANXIETY DISORDER): ICD-10-CM

## 2022-02-17 NOTE — LETTER
[facility]  [address]  [place] [address]  [phone]          February 18, 2022    Carlos Faith                                                                                                            [address]   [place] [address]            Dear Carlos,    We are sending you this letter to let you know that the prescription for Buspirone (Buspar)is being filled for 1 time refill only.    You are due for an office visit for your anxiety.    Please call the clinic at the number above to schedule this appointment.  Feel free to contact us with any questions or concerns.  Thank you.    Sincerely,         Laurita Gold, RONY/bk

## 2022-02-18 RX ORDER — BUSPIRONE HYDROCHLORIDE 30 MG/1
TABLET ORAL
Qty: 90 TABLET | Refills: 0 | Status: SHIPPED | OUTPATIENT
Start: 2022-02-18 | End: 2022-03-30

## 2022-02-18 NOTE — TELEPHONE ENCOUNTER
Buspirone (Buspar)is being filled for 1 time refill only due to:  Patient needs to be seen because needs appointment for anxeity .   Please send letter    Suri Farris RN

## 2022-03-30 ENCOUNTER — OFFICE VISIT (OUTPATIENT)
Dept: FAMILY MEDICINE | Facility: CLINIC | Age: 87
End: 2022-03-30
Payer: MEDICARE

## 2022-03-30 VITALS
DIASTOLIC BLOOD PRESSURE: 74 MMHG | SYSTOLIC BLOOD PRESSURE: 136 MMHG | TEMPERATURE: 98.6 F | HEART RATE: 116 BPM | OXYGEN SATURATION: 96 %

## 2022-03-30 DIAGNOSIS — Z85.46 H/O PROSTATE CANCER: ICD-10-CM

## 2022-03-30 DIAGNOSIS — N18.31 STAGE 3A CHRONIC KIDNEY DISEASE (H): ICD-10-CM

## 2022-03-30 DIAGNOSIS — F41.1 GAD (GENERALIZED ANXIETY DISORDER): ICD-10-CM

## 2022-03-30 DIAGNOSIS — F32.1 MODERATE SINGLE CURRENT EPISODE OF MAJOR DEPRESSIVE DISORDER (H): Primary | ICD-10-CM

## 2022-03-30 DIAGNOSIS — E78.2 MIXED HYPERLIPIDEMIA: ICD-10-CM

## 2022-03-30 LAB
ALT SERPL W P-5'-P-CCNC: 20 U/L (ref 0–70)
ANION GAP SERPL CALCULATED.3IONS-SCNC: 6 MMOL/L (ref 3–14)
BUN SERPL-MCNC: 21 MG/DL (ref 7–30)
CALCIUM SERPL-MCNC: 9.3 MG/DL (ref 8.5–10.1)
CHLORIDE BLD-SCNC: 110 MMOL/L (ref 94–109)
CHOLEST SERPL-MCNC: 240 MG/DL
CO2 SERPL-SCNC: 26 MMOL/L (ref 20–32)
CREAT SERPL-MCNC: 1.1 MG/DL (ref 0.66–1.25)
FASTING STATUS PATIENT QL REPORTED: NO
GFR SERPL CREATININE-BSD FRML MDRD: 65 ML/MIN/1.73M2
GLUCOSE BLD-MCNC: 77 MG/DL (ref 70–99)
HDLC SERPL-MCNC: 60 MG/DL
HGB BLD-MCNC: 14.6 G/DL (ref 13.3–17.7)
LDLC SERPL CALC-MCNC: 156 MG/DL
NONHDLC SERPL-MCNC: 180 MG/DL
POTASSIUM BLD-SCNC: 4.3 MMOL/L (ref 3.4–5.3)
PSA SERPL-MCNC: <0.01 UG/L (ref 0–4)
SODIUM SERPL-SCNC: 142 MMOL/L (ref 133–144)
TRIGL SERPL-MCNC: 118 MG/DL

## 2022-03-30 PROCEDURE — 84460 ALANINE AMINO (ALT) (SGPT): CPT | Performed by: NURSE PRACTITIONER

## 2022-03-30 PROCEDURE — 36415 COLL VENOUS BLD VENIPUNCTURE: CPT | Performed by: NURSE PRACTITIONER

## 2022-03-30 PROCEDURE — 99214 OFFICE O/P EST MOD 30 MIN: CPT | Performed by: NURSE PRACTITIONER

## 2022-03-30 PROCEDURE — 80061 LIPID PANEL: CPT | Performed by: NURSE PRACTITIONER

## 2022-03-30 PROCEDURE — 85018 HEMOGLOBIN: CPT | Performed by: NURSE PRACTITIONER

## 2022-03-30 PROCEDURE — 80048 BASIC METABOLIC PNL TOTAL CA: CPT | Performed by: NURSE PRACTITIONER

## 2022-03-30 PROCEDURE — 84153 ASSAY OF PSA TOTAL: CPT | Performed by: NURSE PRACTITIONER

## 2022-03-30 RX ORDER — MIRTAZAPINE 30 MG/1
30 TABLET, FILM COATED ORAL AT BEDTIME
Qty: 90 TABLET | Refills: 1 | Status: SHIPPED | OUTPATIENT
Start: 2022-03-30 | End: 2022-11-03

## 2022-03-30 RX ORDER — BUSPIRONE HYDROCHLORIDE 15 MG/1
15 TABLET ORAL DAILY
Qty: 90 TABLET | Refills: 1 | Status: SHIPPED | OUTPATIENT
Start: 2022-03-30 | End: 2022-11-03

## 2022-03-30 ASSESSMENT — PATIENT HEALTH QUESTIONNAIRE - PHQ9: SUM OF ALL RESPONSES TO PHQ QUESTIONS 1-9: 0

## 2022-03-30 ASSESSMENT — PAIN SCALES - GENERAL: PAINLEVEL: NO PAIN (0)

## 2022-03-30 NOTE — PATIENT INSTRUCTIONS
Follow-up with Dr. Bagley or Lacy Arnold at [facility]  Consider cutting your mirtazapine or amitriptyline in half to reduce your medication burden.

## 2022-03-30 NOTE — PROGRESS NOTES
Assessment & Plan     Moderate single current episode of major depressive disorder (H)  Patient to try decreasing dose of amitriptyline or mirtazapine given symptoms are stable.  I again reviewed the risk of being on these meds with him.  - amitriptyline (ELAVIL) 25 MG tablet; Take 1 tablet (25 mg) by mouth At Bedtime    Stage 3a chronic kidney disease (H)    - Albumin Random Urine Quantitative with Creat Ratio; Future  - BASIC METABOLIC PANEL; Future  - Hemoglobin; Future  - Albumin Random Urine Quantitative with Creat Ratio  - BASIC METABOLIC PANEL  - Hemoglobin    KANDI (generalized anxiety disorder)  As above.   - busPIRone HCl (BUSPAR) 15 MG tablet; Take 1 tablet (15 mg) by mouth daily  - mirtazapine (REMERON) 30 MG tablet; Take 1 tablet (30 mg) by mouth At Bedtime    H/O prostate cancer    - PSA, tumor marker; Future  - PSA, tumor marker    Mixed hyperlipidemia  Patient okay to stop simvastatin given lack of data in patient's over the age of 80.  - Lipid panel reflex to direct LDL Fasting; Future  - ALT; Future  - Lipid panel reflex to direct LDL Fasting  - ALT    Ordering of each unique test  Prescription drug management             Return in about 6 months (around 9/30/2022) for Medication Recheck.    FLORENTINO Rutherford CNP  [facility] [place] is a 87 year old who presents for the following health issues     HPI     Depression Followup    How are you doing with your depression since your last visit? Improved     Are you having other symptoms that might be associated with depression? No    Have you had a significant life event?  Wife son had throat cancer.      Are you feeling anxious or having panic attacks?   No    Do you have any concerns with your use of alcohol or other drugs? No     Patient decreased buspirone to 15 mg and is only taking in the am.  He feels his anxiety and mood are well controlled.  He notes that he sleeps well on current regimen.    Social  History     Tobacco Use     Smoking status: Never Smoker     Smokeless tobacco: Never Used   Substance Use Topics     Alcohol use: Yes     Alcohol/week: 0.0 standard drinks     Comment: Wine     Drug use: No     PHQ 7/7/2020 1/22/2021 3/30/2022   PHQ-9 Total Score 15 0 0   Q9: Thoughts of better off dead/self-harm past 2 weeks Several days Not at all Not at all     KANDI-7 SCORE 1/4/2018 7/16/2018 7/7/2020   Total Score 10 0 12         Suicide Assessment Five-step Evaluation and Treatment (SAFE-T)      How many servings of fruits and vegetables do you eat daily?  1-2    On average, how many sweetened beverages do you drink each day (Examples: soda, juice, sweet tea, etc.  Do NOT count diet or artificially sweetened beverages)?   0    How many days per week do you exercise enough to make your heart beat faster? 5    How many minutes a day do you exercise enough to make your heart beat faster? 60 or more    How many days per week do you miss taking your medication? 0    Patient stopped his simvastatin.  He was concerned about it's effects on his brain.    Patient denies dysuria, hematuria, urinary retention.    Review of Systems   Constitutional, HEENT, cardiovascular, pulmonary, gi and gu systems are negative, except as otherwise noted.      Objective    /74   Pulse 116   Temp 98.6  F (37  C) (Oral)   SpO2 96%   There is no height or weight on file to calculate BMI.  Physical Exam   GENERAL: healthy, alert and no distress  RESP: lungs clear to auscultation - no rales, rhonchi or wheezes  CV: regular rate and rhythm, normal S1 S2, no S3 or S4, no murmur, click or rub, no peripheral edema and peripheral pulses strong  MS: no gross musculoskeletal defects noted, no edema  PSYCH: mentation appears normal, affect normal/bright

## 2022-03-30 NOTE — LETTER
April 1, 2022      Carlos Faith  [address]   [place] [address]        Dear JamesMarcell,    We are writing to inform you of your test results.    Normal liver enzymes.   Normal kidney function and electrolytes.   Normal PSA.   Okay cholesterol.   No anemia.       If you have any questions please feel free to contact [phone] or myself via [employer].          Resulted Orders   BASIC METABOLIC PANEL   Result Value Ref Range    Sodium 142 133 - 144 mmol/L    Potassium 4.3 3.4 - 5.3 mmol/L    Chloride 110 (H) 94 - 109 mmol/L    Carbon Dioxide (CO2) 26 20 - 32 mmol/L    Anion Gap 6 3 - 14 mmol/L    Urea Nitrogen 21 7 - 30 mg/dL    Creatinine 1.10 0.66 - 1.25 mg/dL    Calcium 9.3 8.5 - 10.1 mg/dL    Glucose 77 70 - 99 mg/dL    GFR Estimate 65 >60 mL/min/1.73m2      Comment:      Effective December 21, 2021 eGFRcr in adults is calculated using the 2021 CKD-EPI creatinine equation which includes age and gender (Delfino cee al., NEJM, DOI: 10.1056/WHOHls8309038)   Lipid panel reflex to direct LDL Fasting   Result Value Ref Range    Cholesterol 240 (H) <200 mg/dL    Triglycerides 118 <150 mg/dL    Direct Measure HDL 60 >=40 mg/dL    LDL Cholesterol Calculated 156 (H) <=100 mg/dL    Non HDL Cholesterol 180 (H) <130 mg/dL    Patient Fasting > 8hrs? No     Narrative    Cholesterol  Desirable:  <200 mg/dL    Triglycerides  Normal:  Less than 150 mg/dL  Borderline High:  150-199 mg/dL  High:  200-499 mg/dL  Very High:  Greater than or equal to 500 mg/dL    Direct Measure HDL  Female:  Greater than or equal to 50 mg/dL   Male:  Greater than or equal to 40 mg/dL    LDL Cholesterol  Desirable:  <100mg/dL  Above Desirable:  100-129 mg/dL   Borderline High:  130-159 mg/dL   High:  160-189 mg/dL   Very High:  >= 190 mg/dL    Non HDL Cholesterol  Desirable:  130 mg/dL  Above Desirable:  130-159 mg/dL  Borderline High:  160-189 mg/dL  High:  190-219 mg/dL  Very High:  Greater than or equal to 220 mg/dL    Hemoglobin   Result Value Ref Range    Hemoglobin 14.6 13.3 - 17.7 g/dL   ALT   Result Value Ref Range    ALT 20 0 - 70 U/L   PSA, tumor marker   Result Value Ref Range    PSA Tumor Marker <0.01 0.00 - 4.00 ug/L       If you have any questions or concerns, please call the clinic at the number listed above.       Sincerely,      FLORENTINO Du CNP/ricks

## 2022-04-01 NOTE — RESULT ENCOUNTER NOTE
Dear Carlos,    Your recent test results are attached.      Normal liver enzymes.  Normal kidney function and electrolytes.  Normal PSA.  Okay cholesterol.  No anemia.      If you have any questions please feel free to contact [phone] or myself via [employer].    Sincerely,  Laurita Gold, CNP

## 2022-04-28 ENCOUNTER — TELEPHONE (OUTPATIENT)
Dept: FAMILY MEDICINE | Facility: CLINIC | Age: 87
End: 2022-04-28
Payer: MEDICARE

## 2022-04-28 NOTE — LETTER
[facility]  [address] [address]  [phone]                                                                                                June 13, 2022    Carlos Faith  [address]    [place] [address]        Dear Mr. Faith,    At [facility] we care about your health and well-being. A review of your chart has indicated that you are due for a(n) Annual Medicare Wellness Visit. Please contact us at [phone] to schedule your appointment.    If you have already had one or all of the above screening tests at another facility, please call us to update your chart.     You may contact the clinic at [phone] if you have any questions or concerns about this request.      Sincerely,      Your Care Team

## 2022-04-28 NOTE — LETTER
[facility]  [address] [address]  [phone]                                                                                                April 28, 2022    Carlos Faith  [address]    [place] [address]        Dear Mr. Faith,    At [facility] we care about your health and well-being. A review of your chart has indicated that you are due for a(n) Medicare Annual Wellness Visit. Please contact us at [phone] to schedule your appointment.     If you have already had one or all of the above screening tests at another facility, please call us to update your chart.     You may contact the clinic at [phone] if you have any questions or concerns about this request.      Sincerely,      Your Care Team

## 2022-05-19 NOTE — PROGRESS NOTES
ICD-10-CM    1. KANDI (generalized anxiety disorder)  F41.1 Albumin Random Urine Quantitative with Creat Ratio   2. Gastroesophageal reflux disease without esophagitis  K21.9 Albumin Random Urine Quantitative with Creat Ratio     omeprazole (PRILOSEC OTC) 20 MG EC tablet   3. Moderate single current episode of major depressive disorder (H)  F32.1    4. Insomnia, unspecified type  G47.00    5. Mixed hyperlipidemia  E78.2    6. Rib pain on right side  R07.81 XR Ribs & Chest Rt 3vw     reestablishing care now  Depression/KANDI/insomnia-he reports of being on mirtazapine 15mg , amitriptyline bupar prn  No side effects     gerd-per patient, not sure why he is on high dose PPI daily, he eun history haital hernia, he reports many years ago he had ulcer and since then been on PPI.  Advised weaning down to 20mg for now and monitor symptoms    High cholesterol-reviewed labs, advised follow up with fasting labs for wellness visit    Recent history of rib pain without any trauma, he is active, no chest pain, no sob, he plays [employer] ball.  He states he has had pain off and on for few weeks, xray done today, neg, advised heat/ice, if not better follow up sooner        , Subjective   Carlos is a 87 year old who presents for the following health issues  accompanied by his self.    HPI     Patient has a couple aches and pains he would like to talk about today. Did not elavorate.   with details.     Hyperlipidemia Follow-Up      Are you regularly taking any medication or supplement to lower your cholesterol?   No    Are you having muscle aches or other side effects that you think could be caused by your cholesterol lowering medication?  No    Depression and Anxiety Follow-Up    How are you doing with your depression since your last visit? stable    How are you doing with your anxiety since your last visit?  stable    Are you having other symptoms that might be associated with depression or anxiety? No    Have you had a significant  life event? No     Do you have any concerns with your use of alcohol or other drugs? No    Social History     Tobacco Use     Smoking status: Never Smoker     Smokeless tobacco: Never Used   Vaping Use     Vaping Use: Never used   Substance Use Topics     Alcohol use: Yes     Alcohol/week: 0.0 standard drinks     Comment: Wine     Drug use: No     PHQ 7/7/2020 1/22/2021 3/30/2022   PHQ-9 Total Score 15 0 0   Q9: Thoughts of better off dead/self-harm past 2 weeks Several days Not at all Not at all     KANDI-7 SCORE 1/4/2018 7/16/2018 7/7/2020   Total Score 10 0 12     Last PHQ-9 3/30/2022   1.  Little interest or pleasure in doing things 0   2.  Feeling down, depressed, or hopeless 0   3.  Trouble falling or staying asleep, or sleeping too much 0   4.  Feeling tired or having little energy 0   5.  Poor appetite or overeating 0   6.  Feeling bad about yourself 0   7.  Trouble concentrating 0   8.  Moving slowly or restless 0   Q9: Thoughts of better off dead/self-harm past 2 weeks 0   PHQ-9 Total Score 0   Difficulty at work, home, or with people Not difficult at all     KANDI-7  7/7/2020   1. Feeling nervous, anxious, or on edge 3   2. Not being able to stop or control worrying 3   3. Worrying too much about different things 0   4. Trouble relaxing 3   5. Being so restless that it is hard to sit still 3   6. Becoming easily annoyed or irritable 0   7. Feeling afraid, as if something awful might happen 0   KANDI-7 Total Score 12   If you checked any problems, how difficult have they made it for you to do your work, take care of things at home, or get along with other people? Not difficult at all     relief factor pills and he is taking pills and it works  Back pain off and on pain   2-3 months , no pain  3 times a week medhat Shay is gone     Chronic insomnia-he is taking 15mg daily, not 30 but wants med  The ASCVD Risk score (Carolyn NASH Jr., et al., 2013) failed to calculate for the following reasons:    The 2013  ASCVD risk score is only valid for ages 40 to 79      Suicide Assessment Five-step Evaluation and Treatment (SAFE-T)            Review of Systems   Constitutional, HEENT, cardiovascular, pulmonary, GI, , musculoskeletal, neuro, skin, endocrine and psych systems are negative, except as otherwise noted.      Objective    There were no vitals taken for this visit.  There is no height or weight on file to calculate BMI.  Physical Exam   GENERAL: healthy, alert and no distress  NECK: no adenopathy, no asymmetry, masses, or scars and thyroid normal to palpation  RESP: lungs clear to auscultation - no rales, rhonchi or wheezes  CV: regular rate and rhythm, normal S1 S2, no S3 or S4, no murmur, click or rub, no peripheral edema and peripheral pulses strong  ABDOMEN: soft, nontender, no hepatosplenomegaly, no masses and bowel sounds normal  MS: no gross musculoskeletal defects noted, no edema

## 2022-05-19 NOTE — PATIENT INSTRUCTIONS
I advise reducing omeprazole 20mg daily  Continue all other meds as planned  Close monitoring of rib pain follow up if not better in 6-8 weeks    Follow up ffor wellness visit in 3-6 months discussed  Antoinette Bagley D.O.        Patient Education

## 2022-05-23 ENCOUNTER — ANCILLARY PROCEDURE (OUTPATIENT)
Dept: GENERAL RADIOLOGY | Facility: CLINIC | Age: 87
End: 2022-05-23
Attending: FAMILY MEDICINE
Payer: MEDICARE

## 2022-05-23 ENCOUNTER — OFFICE VISIT (OUTPATIENT)
Dept: FAMILY MEDICINE | Facility: CLINIC | Age: 87
End: 2022-05-23

## 2022-05-23 VITALS
HEART RATE: 90 BPM | TEMPERATURE: 98 F | SYSTOLIC BLOOD PRESSURE: 140 MMHG | WEIGHT: 141 LBS | HEIGHT: 68 IN | BODY MASS INDEX: 21.37 KG/M2 | RESPIRATION RATE: 16 BRPM | DIASTOLIC BLOOD PRESSURE: 76 MMHG | OXYGEN SATURATION: 100 %

## 2022-05-23 DIAGNOSIS — G47.00 INSOMNIA, UNSPECIFIED TYPE: ICD-10-CM

## 2022-05-23 DIAGNOSIS — R07.81 RIB PAIN ON RIGHT SIDE: ICD-10-CM

## 2022-05-23 DIAGNOSIS — K21.9 GASTROESOPHAGEAL REFLUX DISEASE WITHOUT ESOPHAGITIS: ICD-10-CM

## 2022-05-23 DIAGNOSIS — F41.1 GAD (GENERALIZED ANXIETY DISORDER): Primary | ICD-10-CM

## 2022-05-23 DIAGNOSIS — F32.1 MODERATE SINGLE CURRENT EPISODE OF MAJOR DEPRESSIVE DISORDER (H): ICD-10-CM

## 2022-05-23 DIAGNOSIS — E78.2 MIXED HYPERLIPIDEMIA: ICD-10-CM

## 2022-05-23 PROCEDURE — 71101 X-RAY EXAM UNILAT RIBS/CHEST: CPT | Mod: TC | Performed by: RADIOLOGY

## 2022-05-23 PROCEDURE — 99214 OFFICE O/P EST MOD 30 MIN: CPT | Performed by: FAMILY MEDICINE

## 2022-05-23 RX ORDER — OMEPRAZOLE 20 MG/1
20 TABLET, DELAYED RELEASE ORAL DAILY
Qty: 90 TABLET | Refills: 0 | Status: SHIPPED | OUTPATIENT
Start: 2022-05-23 | End: 2022-11-03

## 2022-05-23 ASSESSMENT — PAIN SCALES - GENERAL: PAINLEVEL: MILD PAIN (3)

## 2022-05-24 ENCOUNTER — APPOINTMENT (OUTPATIENT)
Dept: LAB | Facility: CLINIC | Age: 87
End: 2022-05-24
Payer: MEDICARE

## 2022-05-24 NOTE — RESULT ENCOUNTER NOTE
All your results are essentially angelo. Please contact me if you have any questions.  Take care,  Antoinette Bagley D.O.

## 2022-05-27 ENCOUNTER — LAB (OUTPATIENT)
Dept: LAB | Facility: CLINIC | Age: 87
End: 2022-05-27
Payer: MEDICARE

## 2022-05-27 DIAGNOSIS — F41.1 GAD (GENERALIZED ANXIETY DISORDER): ICD-10-CM

## 2022-05-27 DIAGNOSIS — K21.9 GASTROESOPHAGEAL REFLUX DISEASE WITHOUT ESOPHAGITIS: ICD-10-CM

## 2022-05-27 PROCEDURE — 82043 UR ALBUMIN QUANTITATIVE: CPT

## 2022-05-28 LAB
CREAT UR-MCNC: 125 MG/DL
MICROALBUMIN UR-MCNC: 27 MG/L
MICROALBUMIN/CREAT UR: 21.6 MG/G CR (ref 0–17)

## 2022-05-30 NOTE — RESULT ENCOUNTER NOTE
Your urine shows microalbuminuria make sure your bp is stable, Your Please contact me if you have any questions.  Take care,  Antoinette Bagley D.O.

## 2022-09-26 DIAGNOSIS — F32.1 MODERATE SINGLE CURRENT EPISODE OF MAJOR DEPRESSIVE DISORDER (H): ICD-10-CM

## 2022-09-28 ENCOUNTER — TELEPHONE (OUTPATIENT)
Dept: FAMILY MEDICINE | Facility: CLINIC | Age: 87
End: 2022-09-28

## 2022-09-28 NOTE — TELEPHONE ENCOUNTER
Patient needs a visit in few weeks  Please make an appoint in October  Thanks  Antoinette Bagley D.O.

## 2022-09-28 NOTE — TELEPHONE ENCOUNTER
Prior Authorization Retail Medication Request    Medication/Dose: amitriptyline (ELAVIL) 25 MG tablet    ICD code (if different than what is on RX):  NA  Previously Tried and Failed:  NA  Rationale:  NA    Insurance Name:  NA  Insurance ID:  KEY: [id number]      Pharmacy Information (if different than what is on RX)  Name:  [facility]  Phone:  [facility]    A Prior Authorization has been started.  To submit the PA, please follow the instructions below:    [email or web address]  KEY: [id number]

## 2022-09-29 NOTE — TELEPHONE ENCOUNTER
Central Prior Authorization Team   Phone: [phone]    PA Initiation    Medication: amitriptyline (ELAVIL) 25 MG tablet  Insurance Company: [facility] - Phone [phone] Fax [phone]  Pharmacy Filling the Rx: [id number] - [place] - [address]  Filling Pharmacy Phone: [phone]  Filling Pharmacy Fax:    Start Date: 9/29/2022

## 2022-09-29 NOTE — TELEPHONE ENCOUNTER
Prior Authorization Approval    Authorization Effective Date: 1/1/2022  Authorization Expiration Date: 12/31/2022  Medication: amitriptyline (ELAVIL) 25 MG tablet  Approved Dose/Quantity:    Reference #:     Insurance Company: [employer] [employer] - Phone [phone] Fax [phone]  Expected CoPay:       CoPay Card Available:      Foundation Assistance Needed:    Which Pharmacy is filling the prescription (Not needed for infusion/clinic administered): [id number] - [place], [place] - [address]  Pharmacy Notified: Yes  Patient Notified: Yes  **Instructed pharmacy to notify patient when script is ready to /ship.**           reviewing labs, notes, orders, radiographic studies, as well as counseling and coordinating care with the relevant multidisciplinary team, including with the primary and consulting providers.

## 2022-11-03 ENCOUNTER — VIRTUAL VISIT (OUTPATIENT)
Dept: FAMILY MEDICINE | Facility: CLINIC | Age: 87
End: 2022-11-03
Payer: MEDICARE

## 2022-11-03 DIAGNOSIS — F41.1 GAD (GENERALIZED ANXIETY DISORDER): ICD-10-CM

## 2022-11-03 DIAGNOSIS — F32.1 MODERATE SINGLE CURRENT EPISODE OF MAJOR DEPRESSIVE DISORDER (H): ICD-10-CM

## 2022-11-03 DIAGNOSIS — K21.9 GASTROESOPHAGEAL REFLUX DISEASE WITHOUT ESOPHAGITIS: ICD-10-CM

## 2022-11-03 PROCEDURE — 99443 PR PHYSICIAN TELEPHONE EVALUATION 21-30 MIN: CPT | Mod: 95 | Performed by: FAMILY MEDICINE

## 2022-11-03 RX ORDER — BUSPIRONE HYDROCHLORIDE 15 MG/1
15 TABLET ORAL DAILY
Qty: 90 TABLET | Refills: 1 | Status: SHIPPED | OUTPATIENT
Start: 2022-11-03 | End: 2022-11-17

## 2022-11-03 RX ORDER — OMEPRAZOLE 20 MG/1
20 TABLET, DELAYED RELEASE ORAL DAILY
Qty: 90 TABLET | Refills: 1 | Status: SHIPPED | OUTPATIENT
Start: 2022-11-03 | End: 2023-05-01

## 2022-11-03 RX ORDER — MIRTAZAPINE 30 MG/1
30 TABLET, FILM COATED ORAL AT BEDTIME
Qty: 90 TABLET | Refills: 1 | Status: SHIPPED | OUTPATIENT
Start: 2022-11-03 | End: 2022-11-17

## 2022-11-03 ASSESSMENT — PATIENT HEALTH QUESTIONNAIRE - PHQ9: SUM OF ALL RESPONSES TO PHQ QUESTIONS 1-9: 0

## 2022-11-03 NOTE — PROGRESS NOTES
Carlos is a 87 year old who is being evaluated via a billable telephone visit.      What phone number would you like to be contacted at? [phone]  How would you like to obtain your AVS?       ICD-10-CM    1. KANDI (generalized anxiety disorder)  F41.1 busPIRone (BUSPAR) 15 MG tablet     mirtazapine (REMERON) 30 MG tablet      2. Moderate single current episode of major depressive disorder (H)  F32.1 amitriptyline (ELAVIL) 25 MG tablet      3. Gastroesophageal reflux disease without esophagitis  K21.9 omeprazole (PRILOSEC OTC) 20 MG EC tablet        Depression/Kandi-mirtazapine 1/2 tab of 30 mg , 1/2 tab Buspar, amitriptlyne -doing well , risks of serotonin reaction with combination of med reviewed.      gerd-stable, cont PPI      Arthritic pain , he is taking over-the-counter med called Relief factor which he finds very helpful      Subjective   Carlos is a 87 year old{ presenting for the following health issues:  Chronic Disease Management      HPI     Scheduled for Wellness visit in two weeks.   Patient needing med refilled today.     Depression and Anxiety Follow-Up    How are you doing with your depression since your last visit? No change    How are you doing with your anxiety since your last visit?  No change    Are you having other symptoms that might be associated with depression or anxiety? No    Have you had a significant life event? No     Do you have any concerns with your use of alcohol or other drugs? No    Social History     Tobacco Use     Smoking status: Never     Smokeless tobacco: Never   Vaping Use     Vaping Use: Never used   Substance Use Topics     Alcohol use: Yes     Alcohol/week: 0.0 standard drinks     Comment: Wine     Drug use: No     PHQ 1/22/2021 3/30/2022 11/3/2022   PHQ-9 Total Score 0 0 0   Q9: Thoughts of better off dead/self-harm past 2 weeks Not at all Not at all Not at all     KANDI-7 SCORE 1/4/2018 7/16/2018 7/7/2020   Total Score 10 0 12     Last PHQ-9 11/3/2022   1James Lubin  interest or pleasure in doing things 0   2.  Feeling down, depressed, or hopeless 0   3.  Trouble falling or staying asleep, or sleeping too much 0   4.  Feeling tired or having little energy 0   5.  Poor appetite or overeating 0   6.  Feeling bad about yourself 0   7.  Trouble concentrating 0   8.  Moving slowly or restless 0   Q9: Thoughts of better off dead/self-harm past 2 weeks 0   PHQ-9 Total Score 0   Difficulty at work, home, or with people Not difficult at all     KANDI-7  7/7/2020   1. Feeling nervous, anxious, or on edge 3   2. Not being able to stop or control worrying 3   3. Worrying too much about different things 0   4. Trouble relaxing 3   5. Being so restless that it is hard to sit still 3   6. Becoming easily annoyed or irritable 0   7. Feeling afraid, as if something awful might happen 0   KANDI-7 Total Score 12   If you checked any problems, how difficult have they made it for you to do your work, take care of things at home, or get along with other people? Not difficult at all       Suicide Assessment Five-step Evaluation and Treatment (SAFE-T)            Review of Systems   Constitutional, HEENT, cardiovascular, pulmonary, GI, , musculoskeletal, neuro, skin, endocrine and psych systems are negative, except as otherwise noted.      Objective           Vitals:  No vitals were obtained today due to virtual visit.    Physical Exam   healthy, alert and no distress  PSYCH: Alert and oriented times 3; coherent speech, normal   rate and volume, able to articulate logical thoughts, able   to abstract reason, no tangential thoughts, no hallucinations   or delusions  His affect is normal  RESP: No cough, no audible wheezing, able to talk in full sentences  Remainder of exam unable to be completed due to telephone visits              Phone call duration: 21 minutes

## 2022-11-17 ENCOUNTER — OFFICE VISIT (OUTPATIENT)
Dept: FAMILY MEDICINE | Facility: CLINIC | Age: 87
End: 2022-11-17
Payer: MEDICARE

## 2022-11-17 VITALS
TEMPERATURE: 98.1 F | SYSTOLIC BLOOD PRESSURE: 136 MMHG | DIASTOLIC BLOOD PRESSURE: 76 MMHG | HEART RATE: 78 BPM | RESPIRATION RATE: 16 BRPM | BODY MASS INDEX: 21.67 KG/M2 | WEIGHT: 143 LBS | OXYGEN SATURATION: 98 % | HEIGHT: 68 IN

## 2022-11-17 DIAGNOSIS — F41.1 GAD (GENERALIZED ANXIETY DISORDER): ICD-10-CM

## 2022-11-17 DIAGNOSIS — K21.9 GASTROESOPHAGEAL REFLUX DISEASE WITHOUT ESOPHAGITIS: ICD-10-CM

## 2022-11-17 DIAGNOSIS — Z13.29 SCREENING FOR THYROID DISORDER: ICD-10-CM

## 2022-11-17 DIAGNOSIS — Z00.00 ENCOUNTER FOR MEDICARE ANNUAL WELLNESS EXAM: Primary | ICD-10-CM

## 2022-11-17 DIAGNOSIS — N18.2 CKD (CHRONIC KIDNEY DISEASE) STAGE 2, GFR 60-89 ML/MIN: ICD-10-CM

## 2022-11-17 DIAGNOSIS — Z12.5 SCREENING FOR PROSTATE CANCER: ICD-10-CM

## 2022-11-17 PROBLEM — N18.30 CHRONIC KIDNEY DISEASE, STAGE 3 (H): Status: RESOLVED | Noted: 2021-01-22 | Resolved: 2022-11-17

## 2022-11-17 LAB
ALBUMIN SERPL BCG-MCNC: 3.9 G/DL (ref 3.5–5.2)
ALP SERPL-CCNC: 97 U/L (ref 40–129)
ALT SERPL W P-5'-P-CCNC: 14 U/L (ref 10–50)
ANION GAP SERPL CALCULATED.3IONS-SCNC: 12 MMOL/L (ref 7–15)
AST SERPL W P-5'-P-CCNC: 23 U/L (ref 10–50)
BASOPHILS # BLD AUTO: 0 10E3/UL (ref 0–0.2)
BASOPHILS NFR BLD AUTO: 0 %
BILIRUB SERPL-MCNC: 0.5 MG/DL
BUN SERPL-MCNC: 22.5 MG/DL (ref 8–23)
CALCIUM SERPL-MCNC: 9.5 MG/DL (ref 8.8–10.2)
CHLORIDE SERPL-SCNC: 104 MMOL/L (ref 98–107)
CREAT SERPL-MCNC: 1.13 MG/DL (ref 0.67–1.17)
DEPRECATED HCO3 PLAS-SCNC: 24 MMOL/L (ref 22–29)
EOSINOPHIL # BLD AUTO: 0.2 10E3/UL (ref 0–0.7)
EOSINOPHIL NFR BLD AUTO: 4 %
ERYTHROCYTE [DISTWIDTH] IN BLOOD BY AUTOMATED COUNT: 14.2 % (ref 10–15)
GFR SERPL CREATININE-BSD FRML MDRD: 63 ML/MIN/1.73M2
GLUCOSE SERPL-MCNC: 75 MG/DL (ref 70–99)
HCT VFR BLD AUTO: 44 % (ref 40–53)
HGB BLD-MCNC: 14.9 G/DL (ref 13.3–17.7)
LYMPHOCYTES # BLD AUTO: 1.2 10E3/UL (ref 0.8–5.3)
LYMPHOCYTES NFR BLD AUTO: 23 %
MAGNESIUM SERPL-MCNC: 2 MG/DL (ref 1.7–2.3)
MCH RBC QN AUTO: 32.1 PG (ref 26.5–33)
MCHC RBC AUTO-ENTMCNC: 33.9 G/DL (ref 31.5–36.5)
MCV RBC AUTO: 95 FL (ref 78–100)
MONOCYTES # BLD AUTO: 0.6 10E3/UL (ref 0–1.3)
MONOCYTES NFR BLD AUTO: 12 %
NEUTROPHILS # BLD AUTO: 3.2 10E3/UL (ref 1.6–8.3)
NEUTROPHILS NFR BLD AUTO: 61 %
PHOSPHATE SERPL-MCNC: 4 MG/DL (ref 2.5–4.5)
PLATELET # BLD AUTO: 186 10E3/UL (ref 150–450)
POTASSIUM SERPL-SCNC: 4 MMOL/L (ref 3.4–5.3)
PROT SERPL-MCNC: 6.7 G/DL (ref 6.4–8.3)
PSA SERPL-MCNC: <0.01 NG/ML
PTH-INTACT SERPL-MCNC: 23 PG/ML (ref 15–65)
RBC # BLD AUTO: 4.64 10E6/UL (ref 4.4–5.9)
SODIUM SERPL-SCNC: 140 MMOL/L (ref 136–145)
TSH SERPL DL<=0.005 MIU/L-ACNC: 2.47 UIU/ML (ref 0.3–4.2)
WBC # BLD AUTO: 5.2 10E3/UL (ref 4–11)

## 2022-11-17 PROCEDURE — 83970 ASSAY OF PARATHORMONE: CPT | Performed by: FAMILY MEDICINE

## 2022-11-17 PROCEDURE — G0103 PSA SCREENING: HCPCS | Performed by: FAMILY MEDICINE

## 2022-11-17 PROCEDURE — G0439 PPPS, SUBSEQ VISIT: HCPCS | Performed by: FAMILY MEDICINE

## 2022-11-17 PROCEDURE — 36415 COLL VENOUS BLD VENIPUNCTURE: CPT | Performed by: FAMILY MEDICINE

## 2022-11-17 PROCEDURE — 80050 GENERAL HEALTH PANEL: CPT | Performed by: FAMILY MEDICINE

## 2022-11-17 PROCEDURE — 84100 ASSAY OF PHOSPHORUS: CPT | Performed by: FAMILY MEDICINE

## 2022-11-17 PROCEDURE — 83735 ASSAY OF MAGNESIUM: CPT | Performed by: FAMILY MEDICINE

## 2022-11-17 PROCEDURE — 99214 OFFICE O/P EST MOD 30 MIN: CPT | Mod: 25 | Performed by: FAMILY MEDICINE

## 2022-11-17 RX ORDER — BUSPIRONE HYDROCHLORIDE 15 MG/1
15 TABLET ORAL DAILY
Qty: 90 TABLET | Refills: 1 | Status: SHIPPED | OUTPATIENT
Start: 2022-11-17 | End: 2022-11-17

## 2022-11-17 RX ORDER — BUSPIRONE HYDROCHLORIDE 15 MG/1
15 TABLET ORAL DAILY
Qty: 90 TABLET | Refills: 1 | Status: SHIPPED | OUTPATIENT
Start: 2022-11-17 | End: 2024-01-02

## 2022-11-17 RX ORDER — MIRTAZAPINE 30 MG/1
30 TABLET, FILM COATED ORAL AT BEDTIME
Qty: 90 TABLET | Refills: 1 | Status: SHIPPED | OUTPATIENT
Start: 2022-11-17 | End: 2024-01-02

## 2022-11-17 ASSESSMENT — PAIN SCALES - GENERAL: PAINLEVEL: NO PAIN (0)

## 2022-11-17 NOTE — PROGRESS NOTES
"  SUBJECTIVE:   Carlos Faith is a 87 year old male who presents for Preventive Visit.      Patient has been advised of split billing requirements and indicates understanding: Yes  Are you in the first 12 months of your Medicare Part B coverage?  No    Physical Health:    In general, how would you rate your overall physical health? excellent    Outside of work, how many days during the week do you exercise? 2-3 days/week pickle ball    Outside of work, approximately how many minutes a day do you exercise?30-45 minutes    If you drink alcohol do you typically have >3 drinks per day or >7 drinks per week? No    Do you usually eat at least 4 servings of fruit and vegetables a day, include whole grains & fiber and avoid regularly eating high fat or \"junk\" foods? Yes    Do you have any problems taking medications regularly?  No    Do you have any side effects from medications? not applicable    Needs assistance for the following daily activities: no assistance needed    Which of the following safety concerns are present in your home?  none identified     Hearing impairment: No    In the past 6 months, have you been bothered by leaking of urine? no    Mental Health:    In general, how would you rate your overall mental or emotional health? excellent  PHQ-2 Score:      Do you feel safe in your environment? Yes    Have you ever done Advance Care Planning? (For example, a Health Directive, POLST, or a discussion with a medical provider or your loved ones about your wishes): No, advance care planning information given to patient to review.  Patient plans to discuss their wishes with loved ones or provider.      Additional concerns to address?  None  Declines COVID booster.     Fall risk:  Fallen 2 or more times in the past year?: No  Any fall with injury in the past year?: No    Cognitive Screenin) Repeat 3 items (Leader, Season, Table)    2) Clock draw: NORMAL  3) 3 item recall: Recalls 3 objects  Results: 3 " items recalled: COGNITIVE IMPAIRMENT LESS LIKELY    Mini-CogTM Copyright DAVIDSON Mcgarry. Licensed by the author for use in [facility]; reprinted with permission ([email or web address]). All rights reserved.      Do you have sleep apnea, excessive snoring or daytime drowsiness?: no      Relief factors daily tabs soham pain  He is active physical   Mental activity  No memory issues    1/2 mirtazapine and 1/2 buspar instead of full days  He wants a full tab so he does not pay full price      Reviewed and updated as needed this visit by clinical staff                  Reviewed and updated as needed this visit by Provider                 Social History     Tobacco Use     Smoking status: Never     Smokeless tobacco: Never   Substance Use Topics     Alcohol use: Yes     Alcohol/week: 0.0 standard drinks     Comment: Wine                           Current providers sharing in care for this patient include:   Patient Care Team:  Antoinette Bagley DO as PCP - General (Family Medicine)  Antoinette Bagley DO as Assigned PCP    The following health maintenance items are reviewed in Epic and correct as of today:  Health Maintenance   Topic Date Due     DTAP/TDAP/TD IMMUNIZATION (1 - Tdap) 06/10/2008     MEDICARE ANNUAL WELLNESS VISIT  11/17/2021     COVID-19 Vaccine (5 - Booster for Moderna series) 12/30/2021     ANNUAL REVIEW OF HM ORDERS  09/09/2022     BMP  03/30/2023     LIPID  03/30/2023     HEMOGLOBIN  03/30/2023     PHQ-9  05/03/2023     MICROALBUMIN  05/27/2023     FALL RISK ASSESSMENT  11/17/2023     ADVANCE CARE PLANNING  10/23/2024     DEPRESSION ACTION PLAN  Completed     INFLUENZA VACCINE  Completed     Pneumococcal Vaccine: 65+ Years  Completed     URINALYSIS  Completed     ZOSTER IMMUNIZATION  Completed     IPV IMMUNIZATION  Aged Out     MENINGITIS IMMUNIZATION  Aged Out     BP Readings from Last 3 Encounters:   11/17/22 136/76   05/23/22 (!) 140/76   03/30/22 136/74    Wt Readings from Last 3  "Encounters:   11/17/22 64.9 kg (143 lb)   05/23/22 64 kg (141 lb)   01/22/21 67 kg (147 lb 9.6 oz)                  Pneumonia Vaccine:For adults with an immunocompromising condition, cerebrospinal fluid leak, or cochlear implant, administer 1 dose of PCV13 first then give 1 dose of PPSV23 at least 1 year later. Anyone who received any doses of PPSV23 before age 65 should receive 1 final dose of the vaccine at age 65 or older. Administer this last dose at least 5 years after the prior PPSV23 dose.    ROS:  Constitutional, HEENT, cardiovascular, pulmonary, GI, , musculoskeletal, neuro, skin, endocrine and psych systems are negative, except as otherwise noted.    OBJECTIVE:   There were no vitals taken for this visit. Estimated body mass index is 21.39 kg/m  as calculated from the following:    Height as of 5/23/22: 1.729 m (5' 8.07\").    Weight as of 5/23/22: 64 kg (141 lb).  EXAM:   GENERAL: healthy, alert and no distress  EYES: Eyes grossly normal to inspection, PERRL and conjunctivae and sclerae normal  HENT: ear canals and TM's normal, nose and mouth without ulcers or lesions  NECK: no adenopathy, no asymmetry, masses, or scars and thyroid normal to palpation  RESP: lungs clear to auscultation - no rales, rhonchi or wheezes  CV: regular rate and rhythm, normal S1 S2, no S3 or S4, no murmur, click or rub, no peripheral edema and peripheral pulses strong  ABDOMEN: soft, nontender, no hepatosplenomegaly, no masses and bowel sounds normal  MS: no gross musculoskeletal defects noted, no edema  SKIN: no suspicious lesions or rashes  NEURO: Normal strength and tone, mentation intact and speech normal  PSYCH: mentation appears normal, affect normal/bright    Diagnostic Test Results:  Labs reviewed in Epic    ASSESSMENT / PLAN:       ICD-10-CM    1. Encounter for Medicare annual wellness exam  Z00.00       2. KANDI (generalized anxiety disorder)  F41.1 mirtazapine (REMERON) 30 MG tablet     busPIRone (BUSPAR) 15 MG tablet " "    DISCONTINUED: busPIRone (BUSPAR) 15 MG tablet      3. Gastroesophageal reflux disease without esophagitis  K21.9 Comprehensive metabolic panel (BMP + Alb, Alk Phos, ALT, AST, Total. Bili, TP)     CBC with platelets and differential     Comprehensive metabolic panel (BMP + Alb, Alk Phos, ALT, AST, Total. Bili, TP)     CBC with platelets and differential      4. CKD (chronic kidney disease) stage 2, GFR 60-89 ml/min  N18.2 Comprehensive metabolic panel (BMP + Alb, Alk Phos, ALT, AST, Total. Bili, TP)     Albumin Random Urine Quantitative with Creat Ratio     Parathyroid Hormone Intact     Magnesium     Phosphorus     Comprehensive metabolic panel (BMP + Alb, Alk Phos, ALT, AST, Total. Bili, TP)     Albumin Random Urine Quantitative with Creat Ratio     Parathyroid Hormone Intact     Magnesium     Phosphorus      5. Screening for thyroid disorder  Z13.29 TSH with free T4 reflex     TSH with free T4 reflex      6. Screening for prostate cancer  Z12.5 PSA, screen     PSA, screen      adair-stable on current med, he is taking 1/2 tab Remeron, buspar, refilled    History of haital hernia and gerd-refilled meds    ckd -stable,cont monitoring, avoid too much nsaids  Check fasting labs  He is very active and is doing well  Continue activity as usual    Patient has been advised of split billing requirements and indicates understanding: Yes    COUNSELING:  Reviewed preventive health counseling, as reflected in patient instructions       Regular exercise       Healthy diet/nutrition       Vision screening       Hearing screening       Fall risk prevention       Safe sex practices/STD prevention       Colon cancer screening       Prostate cancer screening    Estimated body mass index is 21.39 kg/m  as calculated from the following:    Height as of 5/23/22: 1.729 m (5' 8.07\").    Weight as of 5/23/22: 64 kg (141 lb).        He reports that he has never smoked. He has never used smokeless tobacco.    Appropriate preventive " services were discussed with this patient, including applicable screening as appropriate for cardiovascular disease, diabetes, osteopenia/osteoporosis, and glaucoma.  As appropriate for age/gender, discussed screening for colorectal cancer, prostate cancer, breast cancer, and cervical cancer. Checklist reviewing preventive services available has been given to the patient.    Reviewed patients plan of care and provided an AVS. The Intermediate Care Plan ( asthma action plan, low back pain action plan, and migraine action plan) for Carlos meets the Care Plan requirement. This Care Plan has been established and reviewed with the Patient.    Counseling Resources:  ATP IV Guidelines  Pooled Cohorts Equation Calculator  Breast Cancer Risk Calculator  BRCA-Related Cancer Risk Assessment: FHS-7 Tool  FRAX Risk Assessment  ICSI Preventive Guidelines  Dietary Guidelines for Americans, 2010  USDA's MyPlate  ASA Prophylaxis  Lung CA Screening    DO DEBBI Pop [facility]

## 2022-11-17 NOTE — PATIENT INSTRUCTIONS
Refilled all meds  Get labs today  Follow up in 6 months  Antoinette Bagley D.O.    Patient Education   Personalized Prevention Plan  You are due for the preventive services outlined below.  Your care team is available to assist you in scheduling these services.  If you have already completed any of these items, please share that information with your care team to update in your medical record.  Health Maintenance Due   Topic Date Due    Diptheria Tetanus Pertussis (DTAP/TDAP/TD) Vaccine (1 - Tdap) 06/10/2008    Annual Wellness Visit  11/17/2021    FALL RISK ASSESSMENT  11/17/2021    COVID-19 Vaccine (5 - Booster for Moderna series) 12/30/2021    ANNUAL REVIEW OF HM ORDERS  09/09/2022

## 2022-11-17 NOTE — LETTER
November 21, 2022      Carlos Faith  [address]    [place] [address]        Dear Carlos,     Your recent lab results were within normal limits. A copy of those results are included with this letter.        Sincerely,        Antoinette Bagley, DO    Results for orders placed or performed in visit on 11/17/22   Comprehensive metabolic panel (BMP + Alb, Alk Phos, ALT, AST, Total. Bili, TP)     Status: Normal   Result Value Ref Range    Sodium 140 136 - 145 mmol/L    Potassium 4.0 3.4 - 5.3 mmol/L    Chloride 104 98 - 107 mmol/L    Carbon Dioxide (CO2) 24 22 - 29 mmol/L    Anion Gap 12 7 - 15 mmol/L    Urea Nitrogen 22.5 8.0 - 23.0 mg/dL    Creatinine 1.13 0.67 - 1.17 mg/dL    Calcium 9.5 8.8 - 10.2 mg/dL    Glucose 75 70 - 99 mg/dL    Alkaline Phosphatase 97 40 - 129 U/L    AST 23 10 - 50 U/L    ALT 14 10 - 50 U/L    Protein Total 6.7 6.4 - 8.3 g/dL    Albumin 3.9 3.5 - 5.2 g/dL    Bilirubin Total 0.5 <=1.2 mg/dL    GFR Estimate 63 >60 mL/min/1.73m2   TSH with free T4 reflex     Status: Normal   Result Value Ref Range    TSH 2.47 0.30 - 4.20 uIU/mL   PSA, screen     Status: None   Result Value Ref Range    Prostate Specific Antigen Screen <0.01 ng/mL    Narrative    This result is obtained using the Roche Elecsys total PSA method on the prasanna [id number] immunoassay analyzer. Results obtained with different assay methods or kits cannot be used interchangeably.   Albumin Random Urine Quantitative with Creat Ratio     Status: None   Result Value Ref Range    Albumin Urine mg/L 21.2 mg/L    Albumin Urine mg/g Cr 16.83 0.00 - 17.00 mg/g Cr    Creatinine Urine mg/dL 126.0 mg/dL   Parathyroid Hormone Intact     Status: Normal   Result Value Ref Range    Parathyroid Hormone Intact 23 15 - 65 pg/mL    Narrative    This result was obtained with the Roche Elecsys PTH STAT assay.   This reference range differs from PTH assays used in other [facility] laboratories.   Magnesium     Status: Normal    Result Value Ref Range    Magnesium 2.0 1.7 - 2.3 mg/dL   Phosphorus     Status: Normal   Result Value Ref Range    Phosphorus 4.0 2.5 - 4.5 mg/dL   CBC with platelets and differential     Status: None   Result Value Ref Range    WBC Count 5.2 4.0 - 11.0 10e3/uL    RBC Count 4.64 4.40 - 5.90 10e6/uL    Hemoglobin 14.9 13.3 - 17.7 g/dL    Hematocrit 44.0 40.0 - 53.0 %    MCV 95 78 - 100 fL    MCH 32.1 26.5 - 33.0 pg    MCHC 33.9 31.5 - 36.5 g/dL    RDW 14.2 10.0 - 15.0 %    Platelet Count 186 150 - 450 10e3/uL    % Neutrophils 61 %    % Lymphocytes 23 %    % Monocytes 12 %    % Eosinophils 4 %    % Basophils 0 %    Absolute Neutrophils 3.2 1.6 - 8.3 10e3/uL    Absolute Lymphocytes 1.2 0.8 - 5.3 10e3/uL    Absolute Monocytes 0.6 0.0 - 1.3 10e3/uL    Absolute Eosinophils 0.2 0.0 - 0.7 10e3/uL    Absolute Basophils 0.0 0.0 - 0.2 10e3/uL   CBC with platelets and differential     Status: None    Narrative    The following orders were created for panel order CBC with platelets and differential.  Procedure                               Abnormality         Status                     ---------                               -----------         ------                     CBC with platelets and d...[[id number]]                      Final result                 Please view results for these tests on the individual orders.

## 2022-11-18 ENCOUNTER — APPOINTMENT (OUTPATIENT)
Dept: LAB | Facility: CLINIC | Age: 87
End: 2022-11-18
Payer: MEDICARE

## 2022-11-18 LAB
CREAT UR-MCNC: 126 MG/DL
MICROALBUMIN UR-MCNC: 21.2 MG/L
MICROALBUMIN/CREAT UR: 16.83 MG/G CR (ref 0–17)

## 2022-11-18 PROCEDURE — 82043 UR ALBUMIN QUANTITATIVE: CPT | Performed by: FAMILY MEDICINE

## 2023-04-28 DIAGNOSIS — K21.9 GASTROESOPHAGEAL REFLUX DISEASE WITHOUT ESOPHAGITIS: ICD-10-CM

## 2023-06-24 DIAGNOSIS — F32.1 MODERATE SINGLE CURRENT EPISODE OF MAJOR DEPRESSIVE DISORDER (H): ICD-10-CM

## 2023-08-01 DIAGNOSIS — K21.9 GASTROESOPHAGEAL REFLUX DISEASE WITHOUT ESOPHAGITIS: ICD-10-CM

## 2023-09-05 DIAGNOSIS — F32.1 MODERATE SINGLE CURRENT EPISODE OF MAJOR DEPRESSIVE DISORDER (H): ICD-10-CM

## 2023-09-23 DIAGNOSIS — F32.1 MODERATE SINGLE CURRENT EPISODE OF MAJOR DEPRESSIVE DISORDER (H): ICD-10-CM

## 2023-10-29 ENCOUNTER — NURSE TRIAGE (OUTPATIENT)
Dept: NURSING | Facility: CLINIC | Age: 88
End: 2023-10-29
Payer: MEDICARE

## 2023-10-29 NOTE — TELEPHONE ENCOUNTER
Pt has a head and chest cold for the last 6 days.  Non productive cough, no fever.  Pt would like an OV appt.  No available appts for tomorrow in clinic.  Pt intends to go to Urgent Care tomorrow.    Kandy Salinas RN  FNA Nurse Advisor    Reason for Disposition   Cough    Additional Information   Negative: SEVERE difficulty breathing (e.g., struggling for each breath, speaks in single words)   Negative: Bluish (or gray) lips or face now   Negative: [1] Rapid onset of cough AND [2] has hives   Negative: Coughing started suddenly after medicine, an allergic food or bee sting   Negative: [1] Difficulty breathing AND [2] exposure to flames, smoke, or fumes   Negative: [1] Stridor AND [2] difficulty breathing   Negative: Sounds like a life-threatening emergency to the triager   Negative: Choked on object of food that could be caught in the throat   Negative: Chest pain is main symptom   Negative: [1] Previous asthma attacks AND [2] this feels like asthma attack   Negative: Cough lasts > 3 weeks   Negative: Wet cough (productive; white-yellow, yellow, green, or korin colored sputum)   Negative: [1] Dry cough (non-productive;  no sputum or minimal clear sputum) AND [2] within 14 days of COVID-19 Exposure   Negative: [1] MODERATE difficulty breathing (e.g., speaks in phrases, SOB even at rest, pulse 100-120) AND [2] still present when not coughing   Negative: Chest pain  (Exception: MILD central chest pain, present only when coughing.)   Negative: Patient sounds very sick or weak to the triager   Negative: [1] MILD difficulty breathing (e.g., minimal/no SOB at rest, SOB with walking, pulse <100) AND [2] still present when not coughing   Negative: [1] Coughed up blood AND [2] > 1 tablespoon (15 ml)   (Exception: Blood-tinged sputum.)   Negative: Fever > 103 F (39.4 C)   Negative: [1] Fever > 101 F (38.3 C) AND [2] age > 60 years   Negative: [1] Fever > 100.0 F (37.8 C) AND [2] bedridden (e.g., CVA, chronic illness,  recovering from surgery)   Negative: [1] Fever > 100.0 F (37.8 C) AND [2] diabetes mellitus or weak immune system (e.g., HIV positive, cancer chemo, splenectomy, organ transplant, chronic steroids)   Negative: Wheezing is present   Negative: [1] Ankle swelling AND [2] swelling is increasing   Negative: SEVERE coughing spells (e.g., whooping sound after coughing, vomiting after coughing)   Negative: [1] Continuous (nonstop) coughing interferes with work or school AND [2] no improvement using cough treatment per Care Advice   Negative: Fever present > 3 days (72 hours)   Negative: [1] Fever returns after gone for over 24 hours AND [2] symptoms worse or not improved   Negative: [1] Using nasal washes and pain medicine > 24 hours AND [2] sinus pain (around cheekbone or eye) persists   Negative: Earache is present   Negative: Cough has been present for > 3 weeks   Negative: [1] Nasal discharge AND [2] present > 10 days   Negative: [1] Coughed up blood-tinged sputum AND [2] more than once   Negative: [1] Patient also has allergy symptoms (e.g., itchy eyes, clear nasal discharge, postnasal drip) AND [2] they are acting up   Negative: Taking an ACE Inhibitor medicine (e.g., benazepril / LOTENSIN, captopril / CAPOTEN, enalapril / VASOTEC, lisinopril / ZESTRIL)   Negative: Exposure to TB (Tuberculosis)    Protocols used: Cough - Acute Non-Productive-A-AH

## 2023-10-30 NOTE — TELEPHONE ENCOUNTER
Pt calling clinic RN this AM looking for appointments- no appointments available today     RN offered [facility] appointments. Pt declined and will go to     Kirsten Sandoval RN

## 2023-10-31 DIAGNOSIS — K21.9 GASTROESOPHAGEAL REFLUX DISEASE WITHOUT ESOPHAGITIS: ICD-10-CM

## 2023-12-18 DIAGNOSIS — F32.1 MODERATE SINGLE CURRENT EPISODE OF MAJOR DEPRESSIVE DISORDER (H): ICD-10-CM

## 2023-12-18 NOTE — TELEPHONE ENCOUNTER
Patient has not been seen in over a year, he really should be seen 2 times a year  Please schedule him   Antoinette Bagley D.O.

## 2024-01-02 ENCOUNTER — OFFICE VISIT (OUTPATIENT)
Dept: FAMILY MEDICINE | Facility: CLINIC | Age: 89
End: 2024-01-02
Payer: MEDICARE

## 2024-01-02 VITALS
OXYGEN SATURATION: 100 % | HEIGHT: 68 IN | DIASTOLIC BLOOD PRESSURE: 76 MMHG | RESPIRATION RATE: 16 BRPM | TEMPERATURE: 98 F | SYSTOLIC BLOOD PRESSURE: 137 MMHG | WEIGHT: 140 LBS | BODY MASS INDEX: 21.22 KG/M2 | HEART RATE: 96 BPM

## 2024-01-02 DIAGNOSIS — Z12.5 SCREENING PSA (PROSTATE SPECIFIC ANTIGEN): ICD-10-CM

## 2024-01-02 DIAGNOSIS — E78.2 MIXED HYPERLIPIDEMIA: ICD-10-CM

## 2024-01-02 DIAGNOSIS — Z00.00 ENCOUNTER FOR MEDICARE ANNUAL WELLNESS EXAM: Primary | ICD-10-CM

## 2024-01-02 DIAGNOSIS — F32.1 MODERATE SINGLE CURRENT EPISODE OF MAJOR DEPRESSIVE DISORDER (H): ICD-10-CM

## 2024-01-02 DIAGNOSIS — F41.1 GAD (GENERALIZED ANXIETY DISORDER): ICD-10-CM

## 2024-01-02 DIAGNOSIS — N18.31 STAGE 3A CHRONIC KIDNEY DISEASE (H): ICD-10-CM

## 2024-01-02 DIAGNOSIS — K21.9 GASTROESOPHAGEAL REFLUX DISEASE WITHOUT ESOPHAGITIS: ICD-10-CM

## 2024-01-02 LAB
ALBUMIN SERPL BCG-MCNC: 4.1 G/DL (ref 3.5–5.2)
ALP SERPL-CCNC: 99 U/L (ref 40–150)
ALT SERPL W P-5'-P-CCNC: 13 U/L (ref 0–70)
ANION GAP SERPL CALCULATED.3IONS-SCNC: 10 MMOL/L (ref 7–15)
AST SERPL W P-5'-P-CCNC: 22 U/L (ref 0–45)
BASOPHILS # BLD AUTO: 0 10E3/UL (ref 0–0.2)
BASOPHILS NFR BLD AUTO: 0 %
BILIRUB SERPL-MCNC: 0.4 MG/DL
BUN SERPL-MCNC: 21 MG/DL (ref 8–23)
CALCIUM SERPL-MCNC: 9.7 MG/DL (ref 8.8–10.2)
CHLORIDE SERPL-SCNC: 105 MMOL/L (ref 98–107)
CHOLEST SERPL-MCNC: 250 MG/DL
CREAT SERPL-MCNC: 1.03 MG/DL (ref 0.67–1.17)
DEPRECATED HCO3 PLAS-SCNC: 26 MMOL/L (ref 22–29)
EGFRCR SERPLBLD CKD-EPI 2021: 70 ML/MIN/1.73M2
EOSINOPHIL # BLD AUTO: 0.3 10E3/UL (ref 0–0.7)
EOSINOPHIL NFR BLD AUTO: 6 %
ERYTHROCYTE [DISTWIDTH] IN BLOOD BY AUTOMATED COUNT: 13.5 % (ref 10–15)
FASTING STATUS PATIENT QL REPORTED: YES
GLUCOSE SERPL-MCNC: 75 MG/DL (ref 70–99)
HCT VFR BLD AUTO: 45.9 % (ref 40–53)
HDLC SERPL-MCNC: 63 MG/DL
HGB BLD-MCNC: 15 G/DL (ref 13.3–17.7)
IMM GRANULOCYTES # BLD: 0 10E3/UL
IMM GRANULOCYTES NFR BLD: 1 %
LDLC SERPL CALC-MCNC: 167 MG/DL
LYMPHOCYTES # BLD AUTO: 1.2 10E3/UL (ref 0.8–5.3)
LYMPHOCYTES NFR BLD AUTO: 24 %
MCH RBC QN AUTO: 30.9 PG (ref 26.5–33)
MCHC RBC AUTO-ENTMCNC: 32.7 G/DL (ref 31.5–36.5)
MCV RBC AUTO: 95 FL (ref 78–100)
MONOCYTES # BLD AUTO: 0.4 10E3/UL (ref 0–1.3)
MONOCYTES NFR BLD AUTO: 8 %
NEUTROPHILS # BLD AUTO: 3.2 10E3/UL (ref 1.6–8.3)
NEUTROPHILS NFR BLD AUTO: 61 %
NONHDLC SERPL-MCNC: 187 MG/DL
PLATELET # BLD AUTO: 200 10E3/UL (ref 150–450)
POTASSIUM SERPL-SCNC: 4.2 MMOL/L (ref 3.4–5.3)
PROT SERPL-MCNC: 6.8 G/DL (ref 6.4–8.3)
PSA SERPL DL<=0.01 NG/ML-MCNC: <0.01 NG/ML
RBC # BLD AUTO: 4.85 10E6/UL (ref 4.4–5.9)
SODIUM SERPL-SCNC: 141 MMOL/L (ref 135–145)
TRIGL SERPL-MCNC: 102 MG/DL
WBC # BLD AUTO: 5.2 10E3/UL (ref 4–11)

## 2024-01-02 PROCEDURE — 99214 OFFICE O/P EST MOD 30 MIN: CPT | Mod: 25 | Performed by: FAMILY MEDICINE

## 2024-01-02 PROCEDURE — 80053 COMPREHEN METABOLIC PANEL: CPT | Performed by: FAMILY MEDICINE

## 2024-01-02 PROCEDURE — G0439 PPPS, SUBSEQ VISIT: HCPCS | Performed by: FAMILY MEDICINE

## 2024-01-02 PROCEDURE — 36415 COLL VENOUS BLD VENIPUNCTURE: CPT | Performed by: FAMILY MEDICINE

## 2024-01-02 PROCEDURE — 80061 LIPID PANEL: CPT | Performed by: FAMILY MEDICINE

## 2024-01-02 PROCEDURE — G0103 PSA SCREENING: HCPCS | Performed by: FAMILY MEDICINE

## 2024-01-02 PROCEDURE — 85025 COMPLETE CBC W/AUTO DIFF WBC: CPT | Performed by: FAMILY MEDICINE

## 2024-01-02 RX ORDER — MIRTAZAPINE 30 MG/1
30 TABLET, FILM COATED ORAL AT BEDTIME
Qty: 90 TABLET | Refills: 1 | Status: SHIPPED | OUTPATIENT
Start: 2024-01-02 | End: 2024-08-13

## 2024-01-02 RX ORDER — BUSPIRONE HYDROCHLORIDE 15 MG/1
15 TABLET ORAL DAILY
Qty: 90 TABLET | Refills: 1 | Status: SHIPPED | OUTPATIENT
Start: 2024-01-02 | End: 2024-08-13

## 2024-01-02 RX ORDER — RESPIRATORY SYNCYTIAL VIRUS VACCINE 120MCG/0.5
0.5 KIT INTRAMUSCULAR ONCE
Qty: 1 EACH | Refills: 0 | Status: CANCELLED | OUTPATIENT
Start: 2024-01-02 | End: 2024-01-02

## 2024-01-02 ASSESSMENT — PAIN SCALES - GENERAL: PAINLEVEL: NO PAIN (0)

## 2024-01-02 ASSESSMENT — ACTIVITIES OF DAILY LIVING (ADL): CURRENT_FUNCTION: NO ASSISTANCE NEEDED

## 2024-01-02 NOTE — PROGRESS NOTES
"SUBJECTIVE:   Carlos is a 88 year old, presenting for the following:  Wellness Visit        1/2/2024     7:01 AM   Additional Questions   Roomed by jason       Are you in the first 12 months of your Medicare coverage?  No    Healthy Habits:     In general, how would you rate your overall health?  Good    Frequency of exercise:  4-5 days/week    Duration of exercise:  15-30 minutes    Do you usually eat at least 4 servings of fruit and vegetables a day, include whole grains    & fiber and avoid regularly eating high fat or \"junk\" foods?  Yes    Taking medications regularly:  No    Barriers to taking medications:  None    Medication side effects:  Not applicable    Ability to successfully perform activities of daily living:  No assistance needed    Home Safety:  No safety concerns identified    Hearing Impairment:  No hearing concerns    In the past 6 months, have you been bothered by leaking of urine?  No    In general, how would you rate your overall mental or emotional health?  Good    Additional concerns today:  No      Pickel ball-3 days ago  Grapegraful        Have you ever done Advance Care Planning? (For example, a Health Directive, POLST, or a discussion with a medical provider or your loved ones about your wishes): No, advance care planning information given to patient to review.  Patient plans to discuss their wishes with loved ones or provider.         Fall risk  Fallen 2 or more times in the past year?: No  Any fall with injury in the past year?: No    Cognitive Screening   1) Repeat 3 items (Leader, Season, Table)    2) Clock draw: NORMAL  3) 3 item recall: Recalls 3 objects  Results: 3 items recalled: COGNITIVE IMPAIRMENT LESS LIKELY    Mini-CogTM Copyright DAVIDSON Mcgarry. Licensed by the author for use in [facility]; reprinted with permission ([email or web address]). All rights reserved.      Do you have sleep apnea, excessive snoring or daytime drowsiness? : no    Reviewed and updated as needed this " "visit by clinical staff                  Reviewed and updated as needed this visit by Provider                 Social History     Tobacco Use    Smoking status: Never    Smokeless tobacco: Never   Substance Use Topics    Alcohol use: Yes     Alcohol/week: 0.0 standard drinks of alcohol     Comment: Wine              No data to display              Do you have a current opioid prescription? No  Do you use any other controlled substances or medications that are not prescribed by a provider? None      Current providers sharing in care for this patient include:   Patient Care Team:  Antoinette Bagley DO as PCP - General (Family Medicine)  Antoinette Bagley DO as Assigned PCP    The following health maintenance items are reviewed in Epic and correct as of today:  Health Maintenance   Topic Date Due    RSV VACCINE (Pregnancy & 60+) (1 - 1-dose 60+ series) Never done    DTAP/TDAP/TD IMMUNIZATION (1 - Tdap) 06/10/2008    LIPID  03/30/2023    PHQ-9  05/03/2023    INFLUENZA VACCINE (1) 09/01/2023    COVID-19 Vaccine (5 - 2023-24 season) 09/01/2023    MEDICARE ANNUAL WELLNESS VISIT  11/17/2023    ANNUAL REVIEW OF HM ORDERS  11/17/2023    FALL RISK ASSESSMENT  11/17/2023    ADVANCE CARE PLANNING  11/22/2027    DEPRESSION ACTION PLAN  Completed    Pneumococcal Vaccine: 65+ Years  Completed    ZOSTER IMMUNIZATION  Completed    IPV IMMUNIZATION  Aged Out    HPV IMMUNIZATION  Aged Out    MENINGITIS IMMUNIZATION  Aged Out    RSV MONOCLONAL ANTIBODY  Aged Out     Lab work is in process        Review of Systems  Constitutional, HEENT, cardiovascular, pulmonary, GI, , musculoskeletal, neuro, skin, endocrine and psych systems are negative, except as otherwise noted.    OBJECTIVE:   There were no vitals taken for this visit. Estimated body mass index is 21.7 kg/m  as calculated from the following:    Height as of 11/17/22: 1.729 m (5' 8.07\").    Weight as of 11/17/22: 64.9 kg (143 lb).  Physical Exam  GENERAL: healthy, " alert and no distress  NECK: no adenopathy, no asymmetry, masses, or scars and thyroid normal to palpation  RESP: lungs clear to auscultation - no rales, rhonchi or wheezes  CV: regular rate and rhythm, normal S1 S2, no S3 or S4, no murmur, click or rub, no peripheral edema and peripheral pulses strong  ABDOMEN: soft, nontender, no hepatosplenomegaly, no masses and bowel sounds normal  MS: no gross musculoskeletal defects noted, no edema    Diagnostic Test Results:  Labs reviewed in Epic    ASSESSMENT / PLAN:   1. Encounter for Medicare annual wellness exam  Doing well, works out 4 days a week, medhat pérez is his choice of actiivty    2. Mixed hyperlipidemia  Stable, cont dietary changes  - Lipid panel reflex to direct LDL Non-fasting; Future  - Lipid panel reflex to direct LDL Fasting; Future  - Lipid panel reflex to direct LDL Non-fasting    3. KANDI (generalized anxiety disorder)  Stable on meds  - mirtazapine (REMERON) 30 MG tablet; Take 1 tablet (30 mg) by mouth at bedtime  Dispense: 90 tablet; Refill: 1  - busPIRone (BUSPAR) 15 MG tablet; Take 1 tablet (15 mg) by mouth daily  Dispense: 90 tablet; Refill: 1    4. Stage 3a chronic kidney disease (H)  Stable, recheck labs  - CBC with platelets and differential; Future  - Comprehensive metabolic panel (BMP + Alb, Alk Phos, ALT, AST, Total. Bili, TP); Future  - CBC with platelets and differential  - Comprehensive metabolic panel (BMP + Alb, Alk Phos, ALT, AST, Total. Bili, TP)    5. Moderate single current episode of major depressive disorder (H)  Stable on current meds  - amitriptyline (ELAVIL) 25 MG tablet; Take 1 tablet (25 mg) by mouth at bedtime  Dispense: 90 tablet; Refill: 0    6. Gastroesophageal reflux disease without esophagitis  stable  - omeprazole (PRILOSEC) 20 MG DR capsule; Take 1 capsule (20 mg) by mouth daily  Dispense: 90 capsule; Refill: 3    7. Screening PSA (prostate specific antigen)  Continue checking, doing well   - PSA, screen; Future  - PSA,  screen      Patient has been advised of split billing requirements and indicates understanding: Yes      COUNSELING:  Reviewed preventive health counseling, as reflected in patient instructions       Regular exercise       Healthy diet/nutrition       Vision screening       Dental care        He reports that he has never smoked. He has never used smokeless tobacco.      Appropriate preventive services were discussed with this patient, including applicable screening as appropriate for fall prevention, nutrition, physical activity, Tobacco-use cessation, weight loss and cognition.  Checklist reviewing preventive services available has been given to the patient.    Reviewed patients plan of care and provided an AVS. The Intermediate Care Plan ( asthma action plan, low back pain action plan, and migraine action plan) for Carlos meets the Care Plan requirement. This Care Plan has been established and reviewed with the Patient.        DO DEBBI Pop [facility]    Identified Health Risks:

## 2024-01-02 NOTE — PATIENT INSTRUCTIONS
Checking labs   Rsv , TDAP advised  Follow up as planned  Antoinette Bagley D.O.      Patient Education   Personalized Prevention Plan  You are due for the preventive services outlined below.  Your care team is available to assist you in scheduling these services.  If you have already completed any of these items, please share that information with your care team to update in your medical record.  Health Maintenance Due   Topic Date Due    RSV VACCINE (Pregnancy & 60+) (1 - 1-dose 60+ series) Never done    Diptheria Tetanus Pertussis (DTAP/TDAP/TD) Vaccine (1 - Tdap) 06/10/2008    Cholesterol Lab  03/30/2023    Depression Assessment  05/03/2023    Flu Vaccine (1) 09/01/2023    COVID-19 Vaccine (5 - 2023-24 season) 09/01/2023    Annual Wellness Visit  11/17/2023    ANNUAL REVIEW OF HM ORDERS  11/17/2023

## 2024-01-04 NOTE — RESULT ENCOUNTER NOTE
"Your cholesterol is abnormal, please use the recommendations below and recheck labs in 6-12 months.    Ways to improve your cholesterol...    1- Eats less saturated fats (including avoiding \"trans\" fats).    2 - Eat more unsaturated fats  - found in vege  tables, grains, and tree nuts.   Also by replacing butter with canola oil or olive oil.    3 - Eat more nuts.   1-2 ounces (a small handful) of almonds, walnuts, hazelnuts or pecans once a  day in place of other less healthy snacks.    4 - Eat more high   fiber foods - vegetables and whole grains including oat bran, oats, beans, peas, and flax seed.    5 - Eat more fish - such as salmon, tuna, mackerel, and sardines.  1 or 2 six ounce servings per week is a healthy replacement for other proteins.    6 - E  xercise for at least 120 minutes per week - which is equal to 30 minutes 4 days per week.    Antoinette Bagley D.O.    "

## 2024-03-04 NOTE — TELEPHONE ENCOUNTER
Assessment/Plan:             1. DDD (degenerative disc disease), lumbar  -     naproxen (Naprosyn) 500 mg tablet; Take 1 tablet (500 mg total) by mouth 2 (two) times a day with meals  -     XR spine lumbar minimum 4 views non injury; Future; Expected date: 03/04/2024    2. Vitamin D deficiency  -     ergocalciferol (VITAMIN D2) 50,000 units; Take 1 capsule (50,000 Units total) by mouth every 30 (thirty) days    3. Acute midline low back pain without sciatica  Comments:  Lidoderm patch prescribed and physical therapy recommended.  Symptomatic treatment with Tylenol.  Orders:  -     lidocaine (LIDODERM) 5 %; Apply 1 patch topically over 12 hours daily Remove & Discard patch within 12 hours or as directed by MD    4. Malignant neoplasm of lower-inner quadrant of left breast in female, estrogen receptor positive   Comments:  Going to cancer doctor regularly, encouraged  her to continue           Subjective:      Patient ID: Martita Lawrence is a 71 y.o. female.    Low back pain, no trauma, no radiation, no bladder or bowel disturbances, no fever no chills,.  Left breast cancer, worries about cancer spreading to back,  needs her medication        The following portions of the patient's history were reviewed and updated as appropriate: She  has a past medical history of Allergic, Allergies, Arthritis, Carpal tunnel syndrome on right, Chemotherapy induced neutropenia (05/23/2019), Gastroesophageal reflux disease (08/06/2020), GERD (gastroesophageal reflux disease), Lumbar disc disease, Trigger thumb, Vitamin D deficiency, and Wears glasses.  She   Patient Active Problem List    Diagnosis Date Noted    Other insomnia 05/01/2022    Osteopenia 03/05/2021    Gastroesophageal reflux disease 08/06/2020    Breast reconstruction deformity     Use of tamoxifen (Nolvadex) 06/17/2020    Bilateral carpal tunnel syndrome 01/27/2020    Osteoarthritis of multiple joints 01/27/2020    Drug-induced polyneuropathy (HCC) 01/27/2020     Called and spoke with patient. Patient states he is choking on food, it's been going on for a month. He said he got a referral from [facility] but it was not to the correct place. I do not see the previous referral.    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    Thyroid nodule 12/15/2016    Elevated LFTs 10/26/2016    Nausea 10/26/2016    Atypical chest pain 10/24/2016    Epigastric pain 10/24/2016    Plantar fasciitis of right foot 10/20/2016     She  has a past surgical history that includes Thumb arthroscopy; pr edg us exam surgical alter stom duodenum/jejunum (N/A, 02/02/2017); pr fasciectomy plantar fascia partial spx (Right, 10/20/2016); Oophorectomy (Bilateral); US guided breast biopsy left complete (Left, 01/02/2019); Breast biopsy (Left, 01/02/2019); Colonoscopy; pr mastectomy simple complete (N/A, 02/08/2019); pr bx/exc lymph node open superficial (Left, 02/08/2019); pr tissue expander placement breast reconstruction (Bilateral, 02/08/2019); pr implnt bio implnt for soft tissue reinforcement (Bilateral, 02/08/2019); pr huong-implant capsulectomy breast complete (Bilateral, 09/27/2019); pr insj/rplcmt breast implant sep day mastectomy (Bilateral, 09/27/2019); pr revision of reconstructed breast (Bilateral, 08/06/2020); Liposuction w/ fat injection (Bilateral, 08/06/2020); Hysterectomy; [place] tooth extraction; and Upper gastrointestinal endoscopy.  Her family history includes Brain cancer (age of onset: 72) in her mother; Breast cancer (age of onset: 55) in her sister; Breast cancer (age of onset: 65) in her sister; Breast cancer additional onset (age of onset: 65) in her sister; Prostate cancer (age of onset: 58) in her brother; Prostate cancer (age of onset: 78) in her father.  She  reports that she has never smoked. She has never been  exposed to tobacco smoke. She has never used smokeless tobacco. She reports that she does not currently use alcohol after a past usage of about 1.0 standard drink of alcohol per week. She reports that she does not use drugs.  Current Outpatient Medications   Medication Sig Dispense Refill    CALCIUM PO Take 1,200 mg by mouth daily      Cholecalciferol (VITAMIN D PO) Take 1.25 mg by mouth every 30 (thirty) days      clobetasol (TEMOVATE) 0.05 % ointment Apply topically 2 (two) times a day 45 g 0    Cyanocobalamin (VITAMIN B 12 PO) Take 500 mcg by mouth daily       ergocalciferol (VITAMIN D2) 50,000 units Take 1 capsule (50,000 Units total) by mouth every 30 (thirty) days 3 capsule 3    lidocaine (LIDODERM) 5 % Apply 1 patch topically over 12 hours daily Remove & Discard patch within 12 hours or as directed by MD 15 patch 1    metroNIDAZOLE (METROGEL) 0.75 % gel       Multiple Vitamins-Minerals (MULTIVITAL PO) Take by mouth daily       naproxen (Naprosyn) 500 mg tablet Take 1 tablet (500 mg total) by mouth 2 (two) times a day with meals 20 tablet 0    Omega-3 Fatty Acids (Fish Oil) 300 MG CAPS Take by mouth      sodium chloride (OCEAN) 0.65 % nasal spray 1 spray into each nostril every 2 (two) hours while awake 60 mL 1    tamoxifen (NOLVADEX) 20 mg tablet Take 1 tablet (20 mg total) by mouth daily 90 tablet 3    tretinoin (RETIN-A) 0.025 % cream       fluticasone (FLONASE) 50 mcg/act nasal spray 1 spray into each nostril daily (Patient not taking: Reported on 3/4/2024) 9 mL 1    Na Sulfate-K Sulfate-Mg Sulf 17.5-3.13-1.6 GM/177ML SOLN Take 1 kit by mouth once for 1 dose (Patient not taking: Reported on 8/9/2022) 354 mL 0    triamcinolone (KENALOG) 0.1 % ointment Apply topically 2 (two) times a day for 7 days (Patient taking differently: Apply topically if needed) 80 g 0     No current facility-administered medications for this visit.     Current Outpatient Medications on File Prior to Visit   Medication Sig    CALCIUM  PO Take 1,200 mg by mouth daily    Cholecalciferol (VITAMIN D PO) Take 1.25 mg by mouth every 30 (thirty) days    clobetasol (TEMOVATE) 0.05 % ointment Apply topically 2 (two) times a day    Cyanocobalamin (VITAMIN B 12 PO) Take 500 mcg by mouth daily     metroNIDAZOLE (METROGEL) 0.75 % gel     Multiple Vitamins-Minerals (MULTIVITAL PO) Take by mouth daily     Omega-3 Fatty Acids (Fish Oil) 300 MG CAPS Take by mouth    sodium chloride (OCEAN) 0.65 % nasal spray 1 spray into each nostril every 2 (two) hours while awake    tamoxifen (NOLVADEX) 20 mg tablet Take 1 tablet (20 mg total) by mouth daily    tretinoin (RETIN-A) 0.025 % cream     [DISCONTINUED] ergocalciferol (VITAMIN D2) 50,000 units Take 1 capsule (50,000 Units total) by mouth every 30 (thirty) days    [DISCONTINUED] lidocaine (LIDODERM) 5 % Apply 1 patch topically over 12 hours daily Remove & Discard patch within 12 hours or as directed by MD    fluticasone (FLONASE) 50 mcg/act nasal spray 1 spray into each nostril daily (Patient not taking: Reported on 3/4/2024)    Na Sulfate-K Sulfate-Mg Sulf 17.5-3.13-1.6 GM/177ML SOLN Take 1 kit by mouth once for 1 dose (Patient not taking: Reported on 8/9/2022)    triamcinolone (KENALOG) 0.1 % ointment Apply topically 2 (two) times a day for 7 days (Patient taking differently: Apply topically if needed)     No current facility-administered medications on file prior to visit.     She is allergic to latex and shellfish-derived products - food allergy..    Review of Systems   Constitutional:  Negative for chills and fever.   HENT:  Negative for congestion, ear pain and sore throat.    Eyes:  Negative for pain.   Respiratory:  Negative for cough and shortness of breath.    Cardiovascular:  Negative for chest pain and leg swelling.   Gastrointestinal:  Negative for abdominal pain, nausea and vomiting.   Endocrine: Negative for polyuria.   Genitourinary:  Negative for difficulty urinating, frequency and urgency.  "  Musculoskeletal:  Positive for arthralgias and back pain.   Skin:  Negative for rash.   Neurological:  Negative for weakness and headaches.   Psychiatric/Behavioral:  Negative for sleep disturbance. The patient is not nervous/anxious.          Objective:      /70 (BP Location: Left arm, Patient Position: Sitting, Cuff Size: Standard)   Pulse 96   Temp 97.9 °F (36.6 °C) (Tympanic)   Ht 5' 5\" (1.651 m)   Wt 81.6 kg (180 lb)   SpO2 99%   BMI 29.95 kg/m²     No results found for this or any previous visit (from the past 1344 hour(s)).     Physical Exam  Constitutional:       Appearance: Normal appearance.   HENT:      Head: Normocephalic.      Right Ear: External ear normal.      Left Ear: External ear normal.      Nose: Nose normal. No congestion.      Mouth/Throat:      Mouth: Mucous membranes are moist.      Pharynx: Oropharynx is clear. No oropharyngeal exudate or posterior oropharyngeal erythema.   Eyes:      Extraocular Movements: Extraocular movements intact.      Conjunctiva/sclera: Conjunctivae normal.   Cardiovascular:      Rate and Rhythm: Normal rate and regular rhythm.      Heart sounds: Normal heart sounds. No murmur heard.  Pulmonary:      Effort: Pulmonary effort is normal.      Breath sounds: Normal breath sounds. No wheezing or rales.   Abdominal:      General: Abdomen is flat. There is no distension.      Palpations: Abdomen is soft.      Tenderness: There is no abdominal tenderness.   Musculoskeletal:      Cervical back: Normal range of motion and neck supple.      Right lower leg: No edema.      Left lower leg: No edema.      Comments: Low back exam-mild paraspinous spasm tenderness present, movement painful and restricted due to pain, SLR positive bilaterally,   Lymphadenopathy:      Cervical: No cervical adenopathy.   Skin:     General: Skin is warm.   Neurological:      General: No focal deficit present.      Mental Status: She is alert and oriented to person, place, and time.       "

## 2024-06-10 DIAGNOSIS — F32.1 MODERATE SINGLE CURRENT EPISODE OF MAJOR DEPRESSIVE DISORDER (H): ICD-10-CM

## 2024-08-13 ENCOUNTER — OFFICE VISIT (OUTPATIENT)
Dept: FAMILY MEDICINE | Facility: CLINIC | Age: 89
End: 2024-08-13
Payer: MEDICARE

## 2024-08-13 VITALS
RESPIRATION RATE: 16 BRPM | DIASTOLIC BLOOD PRESSURE: 76 MMHG | TEMPERATURE: 98 F | SYSTOLIC BLOOD PRESSURE: 128 MMHG | OXYGEN SATURATION: 99 % | HEART RATE: 80 BPM | BODY MASS INDEX: 21.07 KG/M2 | WEIGHT: 139 LBS | HEIGHT: 68 IN

## 2024-08-13 DIAGNOSIS — Z82.0 FAMILY HISTORY OF ALZHEIMER'S DISEASE: Primary | ICD-10-CM

## 2024-08-13 DIAGNOSIS — F41.1 GAD (GENERALIZED ANXIETY DISORDER): ICD-10-CM

## 2024-08-13 DIAGNOSIS — L57.0 AK (ACTINIC KERATOSIS): ICD-10-CM

## 2024-08-13 DIAGNOSIS — Z23 NEED FOR TDAP VACCINATION: ICD-10-CM

## 2024-08-13 DIAGNOSIS — Z13.1 SCREENING FOR DIABETES MELLITUS: ICD-10-CM

## 2024-08-13 DIAGNOSIS — R41.3 MEMORY CHANGE: ICD-10-CM

## 2024-08-13 DIAGNOSIS — K21.9 GASTROESOPHAGEAL REFLUX DISEASE WITHOUT ESOPHAGITIS: ICD-10-CM

## 2024-08-13 DIAGNOSIS — E78.2 MIXED HYPERLIPIDEMIA: ICD-10-CM

## 2024-08-13 DIAGNOSIS — F32.1 MODERATE SINGLE CURRENT EPISODE OF MAJOR DEPRESSIVE DISORDER (H): ICD-10-CM

## 2024-08-13 DIAGNOSIS — Z12.5 SPECIAL SCREENING FOR MALIGNANT NEOPLASM OF PROSTATE: ICD-10-CM

## 2024-08-13 PROCEDURE — 99214 OFFICE O/P EST MOD 30 MIN: CPT | Mod: 25 | Performed by: FAMILY MEDICINE

## 2024-08-13 PROCEDURE — 96127 BRIEF EMOTIONAL/BEHAV ASSMT: CPT | Performed by: FAMILY MEDICINE

## 2024-08-13 PROCEDURE — 17000 DESTRUCT PREMALG LESION: CPT | Performed by: FAMILY MEDICINE

## 2024-08-13 RX ORDER — BUSPIRONE HYDROCHLORIDE 15 MG/1
15 TABLET ORAL DAILY
Qty: 90 TABLET | Refills: 1 | Status: SHIPPED | OUTPATIENT
Start: 2024-08-13

## 2024-08-13 RX ORDER — MIRTAZAPINE 30 MG/1
30 TABLET, FILM COATED ORAL AT BEDTIME
Qty: 90 TABLET | Refills: 1 | Status: SHIPPED | OUTPATIENT
Start: 2024-08-13

## 2024-08-13 ASSESSMENT — PATIENT HEALTH QUESTIONNAIRE - PHQ9
10. IF YOU CHECKED OFF ANY PROBLEMS, HOW DIFFICULT HAVE THESE PROBLEMS MADE IT FOR YOU TO DO YOUR WORK, TAKE CARE OF THINGS AT HOME, OR GET ALONG WITH OTHER PEOPLE: NOT DIFFICULT AT ALL
SUM OF ALL RESPONSES TO PHQ QUESTIONS 1-9: 0
SUM OF ALL RESPONSES TO PHQ QUESTIONS 1-9: 0

## 2024-08-13 ASSESSMENT — ANXIETY QUESTIONNAIRES
6. BECOMING EASILY ANNOYED OR IRRITABLE: NOT AT ALL
GAD7 TOTAL SCORE: 0
3. WORRYING TOO MUCH ABOUT DIFFERENT THINGS: NOT AT ALL
5. BEING SO RESTLESS THAT IT IS HARD TO SIT STILL: NOT AT ALL
7. FEELING AFRAID AS IF SOMETHING AWFUL MIGHT HAPPEN: NOT AT ALL
2. NOT BEING ABLE TO STOP OR CONTROL WORRYING: NOT AT ALL
1. FEELING NERVOUS, ANXIOUS, OR ON EDGE: NOT AT ALL
4. TROUBLE RELAXING: NOT AT ALL
IF YOU CHECKED OFF ANY PROBLEMS ON THIS QUESTIONNAIRE, HOW DIFFICULT HAVE THESE PROBLEMS MADE IT FOR YOU TO DO YOUR WORK, TAKE CARE OF THINGS AT HOME, OR GET ALONG WITH OTHER PEOPLE: NOT DIFFICULT AT ALL
7. FEELING AFRAID AS IF SOMETHING AWFUL MIGHT HAPPEN: NOT AT ALL
8. IF YOU CHECKED OFF ANY PROBLEMS, HOW DIFFICULT HAVE THESE MADE IT FOR YOU TO DO YOUR WORK, TAKE CARE OF THINGS AT HOME, OR GET ALONG WITH OTHER PEOPLE?: NOT DIFFICULT AT ALL

## 2024-08-13 ASSESSMENT — PAIN SCALES - GENERAL: PAINLEVEL: NO PAIN (0)

## 2024-08-13 NOTE — PROGRESS NOTES
1. Family history of Alzheimer's disease  Patient reports significant Alzheimer's diagnosis with many family members.  He has not noticed much of a memory change per se but would like to discuss preventative measures for Alzheimer's dementia.  We did discuss the routine recommendation of continuing to activate his cognitive skills.  I did refer to neurology if he has further questions on recommendation.  He has not again noticed a significant decline in his memory but more due to his advanced age.  He has no neurological changes otherwise.  - Adult Neurology  Referral; Future    2. Memory change  As above  - Adult Neurology  Referral; Future    3. Moderate single current episode of major depressive disorder (H)  Well-controlled on current medication.  He denies any side effects.  - amitriptyline (ELAVIL) 25 MG tablet; Take 1 tablet (25 mg) by mouth at bedtime  Dispense: 90 tablet; Refill: 0    4. KANDI (generalized anxiety disorder)  Well-controlled with current meds  - busPIRone (BUSPAR) 15 MG tablet; Take 1 tablet (15 mg) by mouth daily  Dispense: 90 tablet; Refill: 1  - mirtazapine (REMERON) 30 MG tablet; Take 1 tablet (30 mg) by mouth at bedtime  Dispense: 90 tablet; Refill: 1    5. Gastroesophageal reflux disease without esophagitis  Patient was told by GI specialist that he needs to be on PPI chronically.  He has no side effects.  - omeprazole (PRILOSEC) 20 MG DR capsule; Take 1 capsule (20 mg) by mouth daily  Dispense: 90 capsule; Refill: 3  - Comprehensive metabolic panel (BMP + Alb, Alk Phos, ALT, AST, Total. Bili, TP); Future  - CBC with platelets and differential; Future    6. Special screening for malignant neoplasm of prostate    - PSA, screen; Future  - Lipid panel reflex to direct LDL Fasting; Future    7. Mixed hyperlipidemia  Continue to monitor labs dietary lifestyle changes advised  - Comprehensive metabolic panel (BMP + Alb, Alk Phos, ALT, AST, Total. Bili, TP); Future    8.  Need for Tdap vaccination  Declined vaccines    9. Screening for diabetes mellitus    - Comprehensive metabolic panel (BMP + Alb, Alk Phos, ALT, AST, Total. Bili, TP); Future        AK-treated with liq nitrogen , tolerated it well    Follow-up for physical in 4 to 6 months  Subjective   Carlos is a 89 year old, presenting for the following health issues:  Chronic Disease Management        8/13/2024    11:59 AM   Additional Questions   Roomed by Cindy     Patient wants to discuss xtra virgin olive oil. Read that it can help with Plaque buildup.    History of Present Illness       Reason for visit:  Updates to family history, brother with alzheimers diagnosis      Anxiety   How are you doing with your anxiety since your last visit? stable  Are you having other symptoms that might be associated with anxiety? No  Have you had a significant life event? No   Are you feeling depressed? No  Do you have any concerns with your use of alcohol or other drugs? No    Social History     Tobacco Use    Smoking status: Never    Smokeless tobacco: Never   Vaping Use    Vaping status: Never Used   Substance Use Topics    Alcohol use: Yes     Alcohol/week: 0.0 standard drinks of alcohol     Comment: Wine    Drug use: No         7/16/2018     2:54 PM 7/7/2020     9:47 AM 8/13/2024    11:55 AM   KANDI-7 SCORE   Total Score   0 (minimal anxiety)   Total Score 0 12 0         3/30/2022     8:57 AM 11/3/2022     1:04 PM 8/13/2024    11:55 AM   PHQ   PHQ-9 Total Score 0 0 0   Q9: Thoughts of better off dead/self-harm past 2 weeks Not at all Not at all Not at all         8/13/2024    11:55 AM   Last PHQ-9   1.  Little interest or pleasure in doing things 0   2.  Feeling down, depressed, or hopeless 0   3.  Trouble falling or staying asleep, or sleeping too much 0   4.  Feeling tired or having little energy 0   5.  Poor appetite or overeating 0   6.  Feeling bad about yourself 0   7.  Trouble concentrating 0   8.  Moving slowly or restless 0  "  Q9: Thoughts of better off dead/self-harm past 2 weeks 0   PHQ-9 Total Score 0         8/13/2024    11:55 AM   KANDI-7    1. Feeling nervous, anxious, or on edge 0   2. Not being able to stop or control worrying 0   3. Worrying too much about different things 0   4. Trouble relaxing 0   5. Being so restless that it is hard to sit still 0   6. Becoming easily annoyed or irritable 0   7. Feeling afraid, as if something awful might happen 0   KANDI-7 Total Score 0   If you checked any problems, how difficult have they made it for you to do your work, take care of things at home, or get along with other people? Not difficult at all       His brother who is 90   Father -alzheimer, mother with alzheimer, brother alzheimer        Review of Systems  Constitutional, HEENT, cardiovascular, pulmonary, GI, , musculoskeletal, neuro, skin, endocrine and psych systems are negative, except as otherwise noted.      Objective    /76   Pulse 80   Temp 98  F (36.7  C) (Oral)   Resp 16   Ht 1.738 m (5' 8.41\")   Wt 63 kg (139 lb)   SpO2 99%   BMI 20.88 kg/m    Body mass index is 20.88 kg/m .  Physical Exam   GENERAL: alert and no distress  EYES: Eyes grossly normal to inspection, PERRL and conjunctivae and sclerae normal  HENT: ear canals and TM's normal, nose and mouth without ulcers or lesions  NECK: no adenopathy, no asymmetry, masses, or scars  RESP: lungs clear to auscultation - no rales, rhonchi or wheezes  CV: regular rate and rhythm, normal S1 S2, no S3 or S4, no murmur, click or rub, no peripheral edema  ABDOMEN: soft, nontender, no hepatosplenomegaly, no masses and bowel sounds normal  MS: no gross musculoskeletal defects noted, no edema  SKIN: scaly leison on nose, per patient has been there for a while, patient agreed to treated.  Treated with liquid nitrogen on nose x 3 and tolerated it well  NEURO: Normal strength and tone, mentation intact and speech normal  PSYCH: mentation appears normal, affect " normal/bright            Signed Electronically by: Antoinette Bagley DO

## 2024-08-13 NOTE — PATIENT INSTRUCTIONS
Get TDAP  Go to neurology for concerns of memory and family history of dementia  Continue current meds  Follow up in jan /feb-get fasting labs prior to our appoint, physical appoint  Antoinette Bagley D.O.          Patient Education

## 2024-09-18 ENCOUNTER — TELEPHONE (OUTPATIENT)
Dept: FAMILY MEDICINE | Facility: CLINIC | Age: 89
End: 2024-09-18
Payer: MEDICARE

## 2024-09-18 NOTE — TELEPHONE ENCOUNTER
Dr. Jessica Sanchez, dentist with [facility] calling  Patient came in earlier for a dental visit and only brought in a med list but did not know his medical history  They are planning to schedule teeth extractions but need to review medical history and med list first    - please fax problem list and med list to Dr. Jessica Sanchez with [facility]     Fax [phone]    Jania Mckenna RN  [facility]

## 2024-09-18 NOTE — TELEPHONE ENCOUNTER
Faxed Med list and Problem list to [facility] ATTN: Dr Jessica Sanchez at fax [phone].    REMA Arellano  [facility]

## 2024-10-31 NOTE — TELEPHONE ENCOUNTER
Patient Quality Outreach    Patient is due for the following:   Physical  - OVERDUE    NEXT STEPS:   Schedule a yearly physical    Type of outreach:    Phone, left message for patient/parent to call back. and Sent letter.      Questions for provider review:    None     Cindy Salinas CMA          
[facility] ([phone])

## 2024-12-16 DIAGNOSIS — F32.1 MODERATE SINGLE CURRENT EPISODE OF MAJOR DEPRESSIVE DISORDER (H): ICD-10-CM

## 2025-01-06 ENCOUNTER — OFFICE VISIT (OUTPATIENT)
Dept: FAMILY MEDICINE | Facility: CLINIC | Age: OVER 89
End: 2025-01-06
Payer: MEDICARE

## 2025-01-06 VITALS
DIASTOLIC BLOOD PRESSURE: 70 MMHG | BODY MASS INDEX: 21.07 KG/M2 | OXYGEN SATURATION: 96 % | SYSTOLIC BLOOD PRESSURE: 130 MMHG | TEMPERATURE: 98.1 F | HEIGHT: 68 IN | WEIGHT: 139 LBS | RESPIRATION RATE: 16 BRPM | HEART RATE: 70 BPM

## 2025-01-06 DIAGNOSIS — Z29.11 NEED FOR VACCINATION AGAINST RESPIRATORY SYNCYTIAL VIRUS: ICD-10-CM

## 2025-01-06 DIAGNOSIS — F32.1 MODERATE SINGLE CURRENT EPISODE OF MAJOR DEPRESSIVE DISORDER (H): ICD-10-CM

## 2025-01-06 DIAGNOSIS — N18.31 STAGE 3A CHRONIC KIDNEY DISEASE (H): ICD-10-CM

## 2025-01-06 DIAGNOSIS — K21.9 GASTROESOPHAGEAL REFLUX DISEASE WITHOUT ESOPHAGITIS: ICD-10-CM

## 2025-01-06 DIAGNOSIS — Z12.5 SCREENING FOR PROSTATE CANCER: ICD-10-CM

## 2025-01-06 DIAGNOSIS — R21 RASH AND NONSPECIFIC SKIN ERUPTION: ICD-10-CM

## 2025-01-06 DIAGNOSIS — R01.1 UNDIAGNOSED CARDIAC MURMURS: ICD-10-CM

## 2025-01-06 DIAGNOSIS — Z12.5 SPECIAL SCREENING FOR MALIGNANT NEOPLASM OF PROSTATE: ICD-10-CM

## 2025-01-06 DIAGNOSIS — H91.93 HEARING PROBLEM OF BOTH EARS: ICD-10-CM

## 2025-01-06 DIAGNOSIS — Z23 NEED FOR TDAP VACCINATION: ICD-10-CM

## 2025-01-06 DIAGNOSIS — Z00.00 ENCOUNTER FOR MEDICARE ANNUAL WELLNESS EXAM: Primary | ICD-10-CM

## 2025-01-06 DIAGNOSIS — Z13.1 SCREENING FOR DIABETES MELLITUS: ICD-10-CM

## 2025-01-06 DIAGNOSIS — H61.23 BILATERAL IMPACTED CERUMEN: ICD-10-CM

## 2025-01-06 DIAGNOSIS — E78.2 MIXED HYPERLIPIDEMIA: ICD-10-CM

## 2025-01-06 LAB
ALBUMIN SERPL BCG-MCNC: 4.3 G/DL (ref 3.5–5.2)
ALP SERPL-CCNC: 116 U/L (ref 40–150)
ALT SERPL W P-5'-P-CCNC: 13 U/L (ref 0–70)
ANION GAP SERPL CALCULATED.3IONS-SCNC: 12 MMOL/L (ref 7–15)
AST SERPL W P-5'-P-CCNC: 23 U/L (ref 0–45)
BASOPHILS # BLD AUTO: 0 10E3/UL (ref 0–0.2)
BASOPHILS NFR BLD AUTO: 1 %
BILIRUB SERPL-MCNC: 0.6 MG/DL
BUN SERPL-MCNC: 25 MG/DL (ref 8–23)
CALCIUM SERPL-MCNC: 9.8 MG/DL (ref 8.8–10.4)
CHLORIDE SERPL-SCNC: 104 MMOL/L (ref 98–107)
CHOLEST SERPL-MCNC: 251 MG/DL
CREAT SERPL-MCNC: 1.08 MG/DL (ref 0.67–1.17)
EGFRCR SERPLBLD CKD-EPI 2021: 66 ML/MIN/1.73M2
EOSINOPHIL # BLD AUTO: 0.1 10E3/UL (ref 0–0.7)
EOSINOPHIL NFR BLD AUTO: 1 %
ERYTHROCYTE [DISTWIDTH] IN BLOOD BY AUTOMATED COUNT: 13.1 % (ref 10–15)
EST. AVERAGE GLUCOSE BLD GHB EST-MCNC: 100 MG/DL
FASTING STATUS PATIENT QL REPORTED: YES
FASTING STATUS PATIENT QL REPORTED: YES
GLUCOSE SERPL-MCNC: 117 MG/DL (ref 70–99)
HBA1C MFR BLD: 5.1 % (ref 0–5.6)
HCO3 SERPL-SCNC: 25 MMOL/L (ref 22–29)
HCT VFR BLD AUTO: 44.9 % (ref 40–53)
HDLC SERPL-MCNC: 66 MG/DL
HGB BLD-MCNC: 15.5 G/DL (ref 13.3–17.7)
IMM GRANULOCYTES # BLD: 0 10E3/UL
IMM GRANULOCYTES NFR BLD: 1 %
LDLC SERPL CALC-MCNC: 167 MG/DL
LYMPHOCYTES # BLD AUTO: 0.8 10E3/UL (ref 0.8–5.3)
LYMPHOCYTES NFR BLD AUTO: 12 %
MCH RBC QN AUTO: 31.9 PG (ref 26.5–33)
MCHC RBC AUTO-ENTMCNC: 34.5 G/DL (ref 31.5–36.5)
MCV RBC AUTO: 92 FL (ref 78–100)
MONOCYTES # BLD AUTO: 0.6 10E3/UL (ref 0–1.3)
MONOCYTES NFR BLD AUTO: 8 %
NEUTROPHILS # BLD AUTO: 5.1 10E3/UL (ref 1.6–8.3)
NEUTROPHILS NFR BLD AUTO: 78 %
NONHDLC SERPL-MCNC: 185 MG/DL
PLATELET # BLD AUTO: 217 10E3/UL (ref 150–450)
POTASSIUM SERPL-SCNC: 4.4 MMOL/L (ref 3.4–5.3)
PROT SERPL-MCNC: 7.3 G/DL (ref 6.4–8.3)
PSA SERPL DL<=0.01 NG/ML-MCNC: <0.01 NG/ML
RBC # BLD AUTO: 4.86 10E6/UL (ref 4.4–5.9)
SODIUM SERPL-SCNC: 141 MMOL/L (ref 135–145)
TRIGL SERPL-MCNC: 91 MG/DL
WBC # BLD AUTO: 6.6 10E3/UL (ref 4–11)

## 2025-01-06 RX ORDER — FLUCONAZOLE 150 MG/1
150 TABLET ORAL
Qty: 3 TABLET | Refills: 0 | Status: SHIPPED | OUTPATIENT
Start: 2025-01-06 | End: 2025-01-13

## 2025-01-06 SDOH — HEALTH STABILITY: PHYSICAL HEALTH: ON AVERAGE, HOW MANY MINUTES DO YOU ENGAGE IN EXERCISE AT THIS LEVEL?: 60 MIN

## 2025-01-06 SDOH — HEALTH STABILITY: PHYSICAL HEALTH: ON AVERAGE, HOW MANY DAYS PER WEEK DO YOU ENGAGE IN MODERATE TO STRENUOUS EXERCISE (LIKE A BRISK WALK)?: 7 DAYS

## 2025-01-06 ASSESSMENT — SOCIAL DETERMINANTS OF HEALTH (SDOH): HOW OFTEN DO YOU GET TOGETHER WITH FRIENDS OR RELATIVES?: THREE TIMES A WEEK

## 2025-01-06 NOTE — PATIENT INSTRUCTIONS
Dimas debrox     Refilled meds  Initial labs are normal   Antoinette Bagley D.O.    Patient Education   Preventive Care Advice   This is general advice given by our system to help you stay healthy. However, your care team may have specific advice just for you. Please talk to your care team about your preventive care needs.  Nutrition  Eat 5 or more servings of fruits and vegetables each day.  Try wheat bread, brown rice and whole grain pasta (instead of white bread, rice, and pasta).  Get enough calcium and vitamin D. Check the label on foods and aim for 100% of the RDA (recommended daily allowance).  Lifestyle  Exercise at least 150 minutes each week  (30 minutes a day, 5 days a week).  Do muscle strengthening activities 2 days a week. These help control your weight and prevent disease.  No smoking.  Wear sunscreen to prevent skin cancer.  Have a dental exam and cleaning every 6 months.  Yearly exams  See your health care team every year to talk about:  Any changes in your health.  Any medicines your care team has prescribed.  Preventive care, family planning, and ways to prevent chronic diseases.  Shots (vaccines)   HPV shots (up to age 26), if you've never had them before.  Hepatitis B shots (up to age 59), if you've never had them before.  COVID-19 shot: Get this shot when it's due.  Flu shot: Get a flu shot every year.  Tetanus shot: Get a tetanus shot every 10 years.  Pneumococcal, hepatitis A, and RSV shots: Ask your care team if you need these based on your risk.  Shingles shot (for age 50 and up)  General health tests  Diabetes screening:  Starting at age 35, Get screened for diabetes at least every 3 years.  If you are younger than age 35, ask your care team if you should be screened for diabetes.  Cholesterol test: At age 39, start having a cholesterol test every 5 years, or more often if advised.  Bone density scan (DEXA): At age 50, ask your care team if you should have this scan for osteoporosis (brittle  bones).  Hepatitis C: Get tested at least once in your life.  STIs (sexually transmitted infections)  Before age 24: Ask your care team if you should be screened for STIs.  After age 24: Get screened for STIs if you're at risk. You are at risk for STIs (including HIV) if:  You are sexually active with more than one person.  You don't use condoms every time.  You or a partner was diagnosed with a sexually transmitted infection.  If you are at risk for HIV, ask about PrEP medicine to prevent HIV.  Get tested for HIV at least once in your life, whether you are at risk for HIV or not.  Cancer screening tests  Cervical cancer screening: If you have a cervix, begin getting regular cervical cancer screening tests starting at age 21.  Breast cancer scan (mammogram): If you've ever had breasts, begin having regular mammograms starting at age 40. This is a scan to check for breast cancer.  Colon cancer screening: It is important to start screening for colon cancer at age 45.  Have a colonoscopy test every 10 years (or more often if you're at risk) Or, ask your provider about stool tests like a FIT test every year or Cologuard test every 3 years.  To learn more about your testing options, visit:   .  For help making a decision, visit:   https://bit.ly/wn73249.  Prostate cancer screening test: If you have a prostate, ask your care team if a prostate cancer screening test (PSA) at age 55 is right for you.  Lung cancer screening: If you are a current or former smoker ages 50 to 80, ask your care team if ongoing lung cancer screenings are right for you.  For informational purposes only. Not to replace the advice of your health care provider. Copyright   2023 [employer]. All rights reserved. Clinically reviewed by the [facility] Transitions Program. [employer] [id number] - REV 01/24.

## 2025-01-06 NOTE — PROGRESS NOTES
Preventive Care Visit  [facility]  Antoinette Bagley DO, Family Medicine  Jan 6, 2025      Assessment & Plan     Encounter for Medicare annual wellness exam  Doing well , still active with     Gastroesophageal reflux disease without esophagitis  Stable ,off PPI  - Comprehensive metabolic panel (BMP + Alb, Alk Phos, ALT, AST, Total. Bili, TP); Future  - CBC with platelets and differential; Future  - Comprehensive metabolic panel (BMP + Alb, Alk Phos, ALT, AST, Total. Bili, TP)  - CBC with platelets and differential    Rash and nonspecific skin eruption  Resolved previously with fungal med, he wants med for now for future, risks abd benefits reviewed  - fluconazole (DIFLUCAN) 150 MG tablet; Take 1 tablet (150 mg) by mouth every 3 days for 3 doses.    Stage 3a chronic kidney disease (H)  Stable, recheck labs, advised avoiding nephrotoxic meds    Moderate single current episode of major depressive disorder (H)  Stable on med  - amitriptyline (ELAVIL) 25 MG tablet; Take 1 tablet (25 mg) by mouth at bedtime.  - Comprehensive metabolic panel (BMP + Alb, Alk Phos, ALT, AST, Total. Bili, TP); Future  - CBC with platelets and differential; Future    Mixed hyperlipidemia  Check labs    - Lipid panel reflex to direct LDL Fasting; Future  - CBC with platelets and differential; Future  - Comprehensive metabolic panel (BMP + Alb, Alk Phos, ALT, AST, Total. Bili, TP)    Screening for prostate cancer    - PSA, screen; Future    Need for vaccination against respiratory syncytial virus  Declined all vaccines    Need for Tdap vaccination      Screening for diabetes mellitus    - Hemoglobin A1c; Future  - Comprehensive metabolic panel (BMP + Alb, Alk Phos, ALT, AST, Total. Bili, TP)  - Hemoglobin A1c    Undiagnosed cardiac murmurs  Advised echo  - Echocardiogram Complete; Future    Bilateral impacted cerumen  removed large cerumen from right  , left persisting, advised debrox and follow up   - REMOVE  IMPACTED CERUMEN  - carbamide peroxide (DEBROX) 6.5 % otic solution; Place 5 drops Into the left ear 2 times daily.    Special screening for malignant neoplasm of prostate    - PSA, screen  - Lipid panel reflex to direct LDL Fasting    Hearing problem of both ears  Improving with cermen removal     Patient has been advised of split billing requirements and indicates understanding: Yes        Counseling  Appropriate preventive services were addressed with this patient via screening, questionnaire, or discussion as appropriate for fall prevention, nutrition, physical activity, Tobacco-use cessation, social engagement, weight loss and cognition.  Checklist reviewing preventive services available has been given to the patient.  Reviewed patient's diet, addressing concerns and/or questions.         Anthony Gonzalez is a 89 year old, presenting for the following:  Wellness Visit        1/6/2025     1:41 PM   Additional Questions   Roomed by jason MANCILLA  Stopped several medication. Replaced the Glucosamine Chondroitin with another joint supplement.   Stopped  Buspar and Remeron.           Health Care Directive  Patient does not have a Health Care Directive: Discussed advance care planning with patient; however, patient declined at this time.      1/6/2025   General Health   How would you rate your overall physical health? Good   Feel stress (tense, anxious, or unable to sleep) Not at all         1/6/2025   Nutrition   Diet: Regular (no restrictions)         1/6/2025   Exercise   Days per week of moderate/strenous exercise 3 days   Average minutes spent exercising at this level 60 min         1/6/2025   Social Factors   Frequency of gathering with friends or relatives Three times a week   Worry food won't last until get money to buy more No   Food not last or not have enough money for food? No   Do you have housing? (Housing is defined as stable permanent housing and does not include staying ouside in a car, in  a tent, in an abandoned building, in an overnight shelter, or couch-surfing.) Yes   Are you worried about losing your housing? No   Lack of transportation? No   Unable to get utilities (heat,electricity)? No         1/6/2025   Fall Risk   Fallen 2 or more times in the past year? No    Trouble with walking or balance? No        Proxy-reported          1/6/2025   Activities of Daily Living- Home Safety   Needs help with the following daily activites None of the above   Safety concerns in the home None of the above         1/6/2025   Dental   Dentist two times every year? Yes         1/6/2025   Hearing Screening   Hearing concerns? None of the above         1/6/2025   Driving Risk Screening   Patient/family members have concerns about driving No         1/6/2025   General Alertness/Fatigue Screening   Have you been more tired than usual lately? No         1/6/2025   Urinary Incontinence Screening   Bothered by leaking urine in past 6 months No         1/6/2025   TB Screening   Were you born outside of the [place]? No       Today's PHQ-9 Score:       1/6/2025     1:40 PM   PHQ-9 SCORE   PHQ-9 Total Score MyChart 0   PHQ-9 Total Score 0        Proxy-reported         1/6/2025   Substance Use   Alcohol more than 3/day or more than 7/wk No   Do you have a current opioid prescription? No   How severe/bad is pain from 1 to 10? 0/10 (No Pain)   Do you use any other substances recreationally? No     Social History     Tobacco Use    Smoking status: Never    Smokeless tobacco: Never   Vaping Use    Vaping status: Never Used   Substance Use Topics    Alcohol use: Yes     Alcohol/week: 0.0 standard drinks of alcohol     Comment: Wine    Drug use: No             Reviewed and updated as needed this visit by Provider                    Past Medical History:   Diagnosis Date    Anxiety     Duodenal ulcer     GERD without esophagitis     Hyperlipidemia     Prostate cancer (H) 1994     Past Surgical History:   Procedure Laterality Date      "penile implant  1994    CATARACT IOL, RT/LT      CYSTOSCOPY, DILATE URETHRA, COMBINED N/A 6/1/2017    Procedure: COMBINED CYSTOSCOPY, DILATE URETHRA;  Cysto, urethral ballon dilation and Holmium laser Lithotripsy;  Surgeon: Juanito Vaughan MD;  Location: [facility] OR    HERNIA REPAIR, INGUINAL RT/LT      PROSTATE SURGERY  1994    Prostatectomy     OB History   No obstetric history on file.     Current providers sharing in care for this patient include:  Patient Care Team:  Antoinette Bagley DO as PCP - General (Family Medicine)  Antoinette Bagley DO as Assigned PCP    The following health maintenance items are reviewed in Epic and correct as of today:  Health Maintenance   Topic Date Due    DTAP/TDAP/TD IMMUNIZATION (1 - Tdap) 06/10/2008    RSV VACCINE (1 - 1-dose 75+ series) Never done    MICROALBUMIN  11/18/2023    COVID-19 Vaccine (5 - 2024-25 season) 09/01/2024    BMP  01/02/2025    LIPID  01/02/2025    PHQ-9  07/06/2025    MEDICARE ANNUAL WELLNESS VISIT  01/06/2026    ANNUAL REVIEW OF HM ORDERS  01/06/2026    FALL RISK ASSESSMENT  01/06/2026    HEMOGLOBIN  01/06/2026    ADVANCE CARE PLANNING  01/03/2029    DEPRESSION ACTION PLAN  Completed    INFLUENZA VACCINE  Completed    Pneumococcal Vaccine: 50+ Years  Completed    URINALYSIS  Completed    ZOSTER IMMUNIZATION  Completed    HPV IMMUNIZATION  Aged Out    MENINGITIS IMMUNIZATION  Aged Out    RSV MONOCLONAL ANTIBODY  Aged Out         Review of Systems  Constitutional, HEENT, cardiovascular, pulmonary, GI, , musculoskeletal, neuro, skin, endocrine and psych systems are negative, except as otherwise noted.     Objective    Exam  /70   Pulse 70   Temp 98.1  F (36.7  C) (Oral)   Resp 16   Ht 1.731 m (5' 8.14\")   Wt 63 kg (139 lb)   SpO2 96%   BMI 21.05 kg/m     Estimated body mass index is 21.05 kg/m  as calculated from the following:    Height as of this encounter: 1.731 m (5' 8.14\").    Weight as of this encounter: 63 kg (139 " lb).    Physical Exam  GENERAL: alert and no distress  EYES: Eyes grossly normal to inspection, PERRL and conjunctivae and sclerae normal  HENT: ear canals .cerumen impacted, could not be easily removed with curette, additional effort was required to attempt curette removal and allow for irrigation   and TM's normal, nose and mouth without ulcers or lesions  NECK: no adenopathy, no asymmetry, masses, or scars  RESP: lungs clear to auscultation - no rales, rhonchi or wheezes  CV: regular rate and rhythm, normal S1 S2, no S3 or S4, no murmur, click or rub, no peripheral edema  ABDOMEN: soft, nontender, no hepatosplenomegaly, no masses and bowel sounds normal  MS: no gross musculoskeletal defects noted, no edema  SKIN: no suspicious lesions or rashes  NEURO: Normal strength and tone, mentation intact and speech normal  PSYCH: mentation appears normal, affect normal/bright         1/6/2025   Mini Cog   Clock Draw Score 2 Normal   3 Item Recall 3 objects recalled   Mini Cog Total Score 5              Signed Electronically by: Antoinette Bagley DO

## 2025-01-06 NOTE — NURSING NOTE
Patient identified using two patient identifiers.  Ear exam showing wax occlusion completed by provider.  Solution: warm water and H202/H20 was placed in the bilateral ear(s) via irrigation tool: elephant ear.    Cindy Salinas MA

## 2025-01-09 NOTE — RESULT ENCOUNTER NOTE
"Your cholesterol is abnormal, please use the recommendations below and recheck labs in 6-12 months.  Otherwise stable labs   Ways to improve your cholesterol...    1- Eats less saturated fats (including avoiding \"trans\" fats).    2 - Eat more unsaturated fats  - found in vege  tables, grains, and tree nuts.   Also by replacing butter with canola oil or olive oil.    3 - Eat more nuts.   1-2 ounces (a small handful) of almonds, walnuts, hazelnuts or pecans once a  day in place of other less healthy snacks.    4 - Eat more high   fiber foods - vegetables and whole grains including oat bran, oats, beans, peas, and flax seed.    5 - Eat more fish - such as salmon, tuna, mackerel, and sardines.  1 or 2 six ounce servings per week is a healthy replacement for other proteins.    6 - E  xercise for at least 120 minutes per week - which is equal to 30 minutes 4 days per week.    Antoinette Bgaley D.O.    "

## 2025-01-20 ENCOUNTER — OFFICE VISIT (OUTPATIENT)
Dept: FAMILY MEDICINE | Facility: CLINIC | Age: OVER 89
End: 2025-01-20
Payer: MEDICARE

## 2025-01-20 VITALS
SYSTOLIC BLOOD PRESSURE: 126 MMHG | WEIGHT: 140 LBS | DIASTOLIC BLOOD PRESSURE: 82 MMHG | HEIGHT: 68 IN | RESPIRATION RATE: 16 BRPM | HEART RATE: 94 BPM | TEMPERATURE: 97.9 F | BODY MASS INDEX: 21.22 KG/M2 | OXYGEN SATURATION: 98 %

## 2025-01-20 DIAGNOSIS — Z23 NEED FOR TDAP VACCINATION: ICD-10-CM

## 2025-01-20 DIAGNOSIS — H61.23 BILATERAL IMPACTED CERUMEN: Primary | ICD-10-CM

## 2025-01-20 DIAGNOSIS — N18.31 STAGE 3A CHRONIC KIDNEY DISEASE (H): ICD-10-CM

## 2025-01-20 DIAGNOSIS — Z29.11 NEED FOR VACCINATION AGAINST RESPIRATORY SYNCYTIAL VIRUS: ICD-10-CM

## 2025-01-20 DIAGNOSIS — K21.9 GASTROESOPHAGEAL REFLUX DISEASE WITHOUT ESOPHAGITIS: ICD-10-CM

## 2025-01-20 DIAGNOSIS — R01.1 HEART MURMUR: ICD-10-CM

## 2025-01-20 PROCEDURE — G2211 COMPLEX E/M VISIT ADD ON: HCPCS | Performed by: FAMILY MEDICINE

## 2025-01-20 PROCEDURE — 99214 OFFICE O/P EST MOD 30 MIN: CPT | Performed by: FAMILY MEDICINE

## 2025-01-20 ASSESSMENT — PAIN SCALES - GENERAL: PAINLEVEL_OUTOF10: NO PAIN (0)

## 2025-01-20 NOTE — PROGRESS NOTES
"  Assessment & Plan     1. Bilateral impacted cerumen (Primary)  Used debrox and flushed at home, cleared now, hearing is good enough     2. Stage 3a chronic kidney disease (H)  Stable, cont monitoring     3. Gastroesophageal reflux disease without esophagitis  Stable on one ppi daily   - omeprazole (PRILOSEC) 20 MG DR capsule; Take 1 capsule (20 mg) by mouth daily.  Dispense: 90 capsule; Refill: 3    4. Need for Tdap vaccination  declined    5. Need for vaccination against respiratory syncytial virus  declined    Murmur-new to this provider, advised echo to evaluate    Depression/KANDI-stable cont meds      See Patient Instructions    The longitudinal plan of care for the diagnosis(es)/condition(s) as documented were addressed during this visit. Due to the added complexity in care, I will continue to support Carlos in the subsequent management and with ongoing continuity of care.    Subjective   Carlos is a 89 year old, presenting for the following health issues:  RECHECK (Recheck left ear)        1/20/2025    12:47 PM   Additional Questions   Roomed by Ciara FERNANDEZ MA     History of Present Illness       Reason for visit:  Ear wax flush   He is taking medications regularly.     Gerd taking PPi    Murmur, not done echo yet  Feels ok, no dizziness, no sob, no palpitation, no chest pain    Headaches -stable, cont med          Review of Systems  Constitutional, HEENT, cardiovascular, pulmonary, GI, , musculoskeletal, neuro, skin, endocrine and psych systems are negative, except as otherwise noted.      Objective    /82   Pulse 94   Temp 97.9  F (36.6  C) (Oral)   Resp 16   Ht 1.727 m (5' 8\")   Wt 63.5 kg (140 lb)   SpO2 98%   BMI 21.29 kg/m    Body mass index is 21.29 kg/m .  Physical Exam   GENERAL: alert and no distress  EYES: Eyes grossly normal to inspection, PERRL and conjunctivae and sclerae normal  HENT: ear canals and TM's normal, nose and mouth without ulcers or lesions  NECK: no adenopathy, no " asymmetry, masses, or scars  RESP: lungs clear to auscultation - no rales, rhonchi or wheezes  CV: regular rate and rhythm, loud normal systolic murmur, click or rub, no peripheral edema  ABDOMEN: soft, nontender, no hepatosplenomegaly, no masses and bowel sounds normal  MS: no gross musculoskeletal defects noted, no edema            Signed Electronically by: Antoinette Bagley DO

## 2025-01-20 NOTE — PATIENT INSTRUCTIONS
Ask your insurance regarding Echocardiogram for murmur.  Continue all your meds  Take care  Antoinette Bagley D.O.'

## 2025-04-29 ENCOUNTER — APPOINTMENT (OUTPATIENT)
Dept: RADIOLOGY | Facility: HOSPITAL | Age: OVER 89
DRG: 481 | End: 2025-04-29
Attending: EMERGENCY MEDICINE
Payer: MEDICARE

## 2025-04-29 ENCOUNTER — APPOINTMENT (OUTPATIENT)
Dept: RADIOLOGY | Facility: HOSPITAL | Age: OVER 89
DRG: 481 | End: 2025-04-29
Payer: MEDICARE

## 2025-04-29 ENCOUNTER — ANESTHESIA EVENT (OUTPATIENT)
Dept: SURGERY | Facility: HOSPITAL | Age: OVER 89
End: 2025-04-29
Payer: MEDICARE

## 2025-04-29 ENCOUNTER — HOSPITAL ENCOUNTER (INPATIENT)
Facility: HOSPITAL | Age: OVER 89
DRG: 481 | End: 2025-04-29
Attending: EMERGENCY MEDICINE | Admitting: STUDENT IN AN ORGANIZED HEALTH CARE EDUCATION/TRAINING PROGRAM
Payer: MEDICARE

## 2025-04-29 ENCOUNTER — ANESTHESIA (OUTPATIENT)
Dept: SURGERY | Facility: HOSPITAL | Age: OVER 89
End: 2025-04-29
Payer: MEDICARE

## 2025-04-29 DIAGNOSIS — S72.001A HIP FRACTURE, RIGHT, CLOSED, INITIAL ENCOUNTER (H): ICD-10-CM

## 2025-04-29 LAB
ABO + RH BLD: NORMAL
ANION GAP SERPL CALCULATED.3IONS-SCNC: 6 MMOL/L (ref 7–15)
APTT PPP: 24 SECONDS (ref 22–38)
BASOPHILS # BLD AUTO: 0.1 10E3/UL (ref 0–0.2)
BASOPHILS NFR BLD AUTO: 1 %
BLD GP AB SCN SERPL QL: NEGATIVE
BUN SERPL-MCNC: 23.4 MG/DL (ref 8–23)
CALCIUM SERPL-MCNC: 8.9 MG/DL (ref 8.8–10.4)
CHLORIDE SERPL-SCNC: 109 MMOL/L (ref 98–107)
CREAT SERPL-MCNC: 1.18 MG/DL (ref 0.67–1.17)
EGFRCR SERPLBLD CKD-EPI 2021: 59 ML/MIN/1.73M2
EOSINOPHIL # BLD AUTO: 0 10E3/UL (ref 0–0.7)
EOSINOPHIL NFR BLD AUTO: 0 %
ERYTHROCYTE [DISTWIDTH] IN BLOOD BY AUTOMATED COUNT: 14 % (ref 10–15)
GLUCOSE BLDC GLUCOMTR-MCNC: 117 MG/DL (ref 70–99)
GLUCOSE SERPL-MCNC: 138 MG/DL (ref 70–99)
HCO3 SERPL-SCNC: 26 MMOL/L (ref 22–29)
HCT VFR BLD AUTO: 39.2 % (ref 40–53)
HGB BLD-MCNC: 13.2 G/DL (ref 13.3–17.7)
IMM GRANULOCYTES # BLD: 0.1 10E3/UL
IMM GRANULOCYTES NFR BLD: 1 %
INR PPP: 1.03 (ref 0.85–1.15)
LYMPHOCYTES # BLD AUTO: 0.6 10E3/UL (ref 0.8–5.3)
LYMPHOCYTES NFR BLD AUTO: 6 %
MCH RBC QN AUTO: 31.1 PG (ref 26.5–33)
MCHC RBC AUTO-ENTMCNC: 33.7 G/DL (ref 31.5–36.5)
MCV RBC AUTO: 93 FL (ref 78–100)
MONOCYTES # BLD AUTO: 0.7 10E3/UL (ref 0–1.3)
MONOCYTES NFR BLD AUTO: 6 %
NEUTROPHILS # BLD AUTO: 9 10E3/UL (ref 1.6–8.3)
NEUTROPHILS NFR BLD AUTO: 86 %
NRBC # BLD AUTO: 0 10E3/UL
NRBC BLD AUTO-RTO: 0 /100
PLATELET # BLD AUTO: 201 10E3/UL (ref 150–450)
POTASSIUM SERPL-SCNC: 4.1 MMOL/L (ref 3.4–5.3)
PROTHROMBIN TIME: 13.8 SECONDS (ref 11.8–14.8)
RBC # BLD AUTO: 4.24 10E6/UL (ref 4.4–5.9)
SODIUM SERPL-SCNC: 141 MMOL/L (ref 135–145)
SPECIMEN EXP DATE BLD: NORMAL
WBC # BLD AUTO: 10.4 10E3/UL (ref 4–11)

## 2025-04-29 PROCEDURE — 93005 ELECTROCARDIOGRAM TRACING: CPT | Performed by: EMERGENCY MEDICINE

## 2025-04-29 PROCEDURE — 250N000011 HC RX IP 250 OP 636: Mod: JZ | Performed by: ANESTHESIOLOGY

## 2025-04-29 PROCEDURE — 250N000009 HC RX 250: Performed by: NURSE ANESTHETIST, CERTIFIED REGISTERED

## 2025-04-29 PROCEDURE — 250N000011 HC RX IP 250 OP 636: Mod: JZ | Performed by: EMERGENCY MEDICINE

## 2025-04-29 PROCEDURE — P9045 ALBUMIN (HUMAN), 5%, 250 ML: HCPCS | Performed by: NURSE ANESTHETIST, CERTIFIED REGISTERED

## 2025-04-29 PROCEDURE — 250N000011 HC RX IP 250 OP 636: Performed by: STUDENT IN AN ORGANIZED HEALTH CARE EDUCATION/TRAINING PROGRAM

## 2025-04-29 PROCEDURE — C1713 ANCHOR/SCREW BN/BN,TIS/BN: HCPCS | Performed by: STUDENT IN AN ORGANIZED HEALTH CARE EDUCATION/TRAINING PROGRAM

## 2025-04-29 PROCEDURE — C1769 GUIDE WIRE: HCPCS | Performed by: STUDENT IN AN ORGANIZED HEALTH CARE EDUCATION/TRAINING PROGRAM

## 2025-04-29 PROCEDURE — 360N000084 HC SURGERY LEVEL 4 W/ FLUORO, PER MIN: Performed by: STUDENT IN AN ORGANIZED HEALTH CARE EDUCATION/TRAINING PROGRAM

## 2025-04-29 PROCEDURE — 85610 PROTHROMBIN TIME: CPT | Performed by: EMERGENCY MEDICINE

## 2025-04-29 PROCEDURE — 85730 THROMBOPLASTIN TIME PARTIAL: CPT | Performed by: EMERGENCY MEDICINE

## 2025-04-29 PROCEDURE — 258N000003 HC RX IP 258 OP 636: Performed by: NURSE ANESTHETIST, CERTIFIED REGISTERED

## 2025-04-29 PROCEDURE — 96374 THER/PROPH/DIAG INJ IV PUSH: CPT

## 2025-04-29 PROCEDURE — 73502 X-RAY EXAM HIP UNI 2-3 VIEWS: CPT

## 2025-04-29 PROCEDURE — 99207 PR APP CREDIT; MD BILLING SHARED VISIT: CPT | Mod: FS

## 2025-04-29 PROCEDURE — 85004 AUTOMATED DIFF WBC COUNT: CPT | Performed by: EMERGENCY MEDICINE

## 2025-04-29 PROCEDURE — 120N000001 HC R&B MED SURG/OB

## 2025-04-29 PROCEDURE — 272N000001 HC OR GENERAL SUPPLY STERILE: Performed by: STUDENT IN AN ORGANIZED HEALTH CARE EDUCATION/TRAINING PROGRAM

## 2025-04-29 PROCEDURE — 250N000013 HC RX MED GY IP 250 OP 250 PS 637

## 2025-04-29 PROCEDURE — 710N000009 HC RECOVERY PHASE 1, LEVEL 1, PER MIN: Performed by: STUDENT IN AN ORGANIZED HEALTH CARE EDUCATION/TRAINING PROGRAM

## 2025-04-29 PROCEDURE — 258N000003 HC RX IP 258 OP 636: Performed by: STUDENT IN AN ORGANIZED HEALTH CARE EDUCATION/TRAINING PROGRAM

## 2025-04-29 PROCEDURE — 0QS606Z REPOSITION RIGHT UPPER FEMUR WITH INTRAMEDULLARY INTERNAL FIXATION DEVICE, OPEN APPROACH: ICD-10-PCS | Performed by: STUDENT IN AN ORGANIZED HEALTH CARE EDUCATION/TRAINING PROGRAM

## 2025-04-29 PROCEDURE — 250N000025 HC SEVOFLURANE, PER MIN: Performed by: STUDENT IN AN ORGANIZED HEALTH CARE EDUCATION/TRAINING PROGRAM

## 2025-04-29 PROCEDURE — 250N000009 HC RX 250: Performed by: STUDENT IN AN ORGANIZED HEALTH CARE EDUCATION/TRAINING PROGRAM

## 2025-04-29 PROCEDURE — 80048 BASIC METABOLIC PNL TOTAL CA: CPT | Performed by: EMERGENCY MEDICINE

## 2025-04-29 PROCEDURE — 258N000003 HC RX IP 258 OP 636: Performed by: ANESTHESIOLOGY

## 2025-04-29 PROCEDURE — 250N000011 HC RX IP 250 OP 636

## 2025-04-29 PROCEDURE — 86901 BLOOD TYPING SEROLOGIC RH(D): CPT | Performed by: EMERGENCY MEDICINE

## 2025-04-29 PROCEDURE — 36415 COLL VENOUS BLD VENIPUNCTURE: CPT | Performed by: EMERGENCY MEDICINE

## 2025-04-29 PROCEDURE — 999N000141 HC STATISTIC PRE-PROCEDURE NURSING ASSESSMENT: Performed by: STUDENT IN AN ORGANIZED HEALTH CARE EDUCATION/TRAINING PROGRAM

## 2025-04-29 PROCEDURE — 96376 TX/PRO/DX INJ SAME DRUG ADON: CPT

## 2025-04-29 PROCEDURE — 99285 EMERGENCY DEPT VISIT HI MDM: CPT | Mod: 25

## 2025-04-29 PROCEDURE — 370N000017 HC ANESTHESIA TECHNICAL FEE, PER MIN: Performed by: STUDENT IN AN ORGANIZED HEALTH CARE EDUCATION/TRAINING PROGRAM

## 2025-04-29 PROCEDURE — 250N000011 HC RX IP 250 OP 636: Performed by: NURSE ANESTHETIST, CERTIFIED REGISTERED

## 2025-04-29 PROCEDURE — 999N000180 XR SURGERY CARM FLUORO LESS THAN 5 MIN

## 2025-04-29 PROCEDURE — 73552 X-RAY EXAM OF FEMUR 2/>: CPT | Mod: RT

## 2025-04-29 DEVICE — TFNA FENESTRATED SCREW 110MM - STERILE
Type: IMPLANTABLE DEVICE | Site: HIP | Status: FUNCTIONAL
Brand: TFN-ADVANCE

## 2025-04-29 DEVICE — 11MM/130 DEG TI CANN TFNA 235MM/RIGHT - STERILE
Type: IMPLANTABLE DEVICE | Site: HIP | Status: FUNCTIONAL
Brand: TFN-ADVANCE

## 2025-04-29 DEVICE — LOCKING SCREW FOR IM NAIL Ø 5.0MM / L 40MM / XL25/ STER: Type: IMPLANTABLE DEVICE | Site: HIP | Status: FUNCTIONAL

## 2025-04-29 RX ORDER — AMOXICILLIN 250 MG
1 CAPSULE ORAL 2 TIMES DAILY PRN
Status: DISCONTINUED | OUTPATIENT
Start: 2025-04-29 | End: 2025-05-03 | Stop reason: HOSPADM

## 2025-04-29 RX ORDER — ONDANSETRON 4 MG/1
4 TABLET, ORALLY DISINTEGRATING ORAL EVERY 6 HOURS PRN
Status: DISCONTINUED | OUTPATIENT
Start: 2025-04-29 | End: 2025-05-03 | Stop reason: HOSPADM

## 2025-04-29 RX ORDER — SODIUM PHOSPHATE,MONO-DIBASIC 19G-7G/118
1 ENEMA (ML) RECTAL EVERY MORNING
COMMUNITY

## 2025-04-29 RX ORDER — PANTOPRAZOLE SODIUM 40 MG/1
40 TABLET, DELAYED RELEASE ORAL
Status: DISCONTINUED | OUTPATIENT
Start: 2025-04-30 | End: 2025-05-03 | Stop reason: HOSPADM

## 2025-04-29 RX ORDER — LIDOCAINE 40 MG/G
CREAM TOPICAL
Status: DISCONTINUED | OUTPATIENT
Start: 2025-04-29 | End: 2025-05-03 | Stop reason: HOSPADM

## 2025-04-29 RX ORDER — FENTANYL CITRATE 50 UG/ML
50 INJECTION, SOLUTION INTRAMUSCULAR; INTRAVENOUS ONCE
Status: COMPLETED | OUTPATIENT
Start: 2025-04-29 | End: 2025-04-29

## 2025-04-29 RX ORDER — SODIUM CHLORIDE, SODIUM LACTATE, POTASSIUM CHLORIDE, CALCIUM CHLORIDE 600; 310; 30; 20 MG/100ML; MG/100ML; MG/100ML; MG/100ML
INJECTION, SOLUTION INTRAVENOUS CONTINUOUS
Status: DISCONTINUED | OUTPATIENT
Start: 2025-04-29 | End: 2025-04-29

## 2025-04-29 RX ORDER — FENTANYL CITRATE 50 UG/ML
25 INJECTION, SOLUTION INTRAMUSCULAR; INTRAVENOUS EVERY 5 MIN PRN
Status: DISCONTINUED | OUTPATIENT
Start: 2025-04-29 | End: 2025-04-29 | Stop reason: HOSPADM

## 2025-04-29 RX ORDER — HYDRALAZINE HYDROCHLORIDE 20 MG/ML
10 INJECTION INTRAMUSCULAR; INTRAVENOUS EVERY 4 HOURS PRN
Status: DISCONTINUED | OUTPATIENT
Start: 2025-04-29 | End: 2025-04-30

## 2025-04-29 RX ORDER — BUPIVACAINE HYDROCHLORIDE 5 MG/ML
INJECTION, SOLUTION EPIDURAL; INTRACAUDAL; PERINEURAL
Status: COMPLETED | OUTPATIENT
Start: 2025-04-29 | End: 2025-04-29

## 2025-04-29 RX ORDER — SODIUM CHLORIDE, SODIUM LACTATE, POTASSIUM CHLORIDE, CALCIUM CHLORIDE 600; 310; 30; 20 MG/100ML; MG/100ML; MG/100ML; MG/100ML
INJECTION, SOLUTION INTRAVENOUS CONTINUOUS
Status: DISCONTINUED | OUTPATIENT
Start: 2025-04-29 | End: 2025-04-29 | Stop reason: HOSPADM

## 2025-04-29 RX ORDER — LIDOCAINE HYDROCHLORIDE 10 MG/ML
INJECTION, SOLUTION INFILTRATION; PERINEURAL PRN
Status: DISCONTINUED | OUTPATIENT
Start: 2025-04-29 | End: 2025-04-29

## 2025-04-29 RX ORDER — BISACODYL 10 MG
10 SUPPOSITORY, RECTAL RECTAL DAILY PRN
Status: DISCONTINUED | OUTPATIENT
Start: 2025-05-02 | End: 2025-05-03 | Stop reason: HOSPADM

## 2025-04-29 RX ORDER — OXYCODONE HYDROCHLORIDE 5 MG/1
5 TABLET ORAL EVERY 4 HOURS PRN
Status: DISCONTINUED | OUTPATIENT
Start: 2025-04-29 | End: 2025-05-03 | Stop reason: HOSPADM

## 2025-04-29 RX ORDER — PROCHLORPERAZINE MALEATE 5 MG/1
5 TABLET ORAL EVERY 6 HOURS PRN
Status: DISCONTINUED | OUTPATIENT
Start: 2025-04-29 | End: 2025-05-03 | Stop reason: HOSPADM

## 2025-04-29 RX ORDER — AMOXICILLIN 250 MG
1 CAPSULE ORAL 2 TIMES DAILY
Status: DISCONTINUED | OUTPATIENT
Start: 2025-04-30 | End: 2025-05-03 | Stop reason: HOSPADM

## 2025-04-29 RX ORDER — DEXAMETHASONE SODIUM PHOSPHATE 10 MG/ML
INJECTION, SOLUTION INTRAMUSCULAR; INTRAVENOUS PRN
Status: DISCONTINUED | OUTPATIENT
Start: 2025-04-29 | End: 2025-04-29

## 2025-04-29 RX ORDER — OXYCODONE HYDROCHLORIDE 5 MG/1
5 TABLET ORAL EVERY 4 HOURS PRN
Status: DISCONTINUED | OUTPATIENT
Start: 2025-04-29 | End: 2025-04-30

## 2025-04-29 RX ORDER — HYDROMORPHONE HCL IN WATER/PF 6 MG/30 ML
0.2 PATIENT CONTROLLED ANALGESIA SYRINGE INTRAVENOUS
Status: DISCONTINUED | OUTPATIENT
Start: 2025-04-29 | End: 2025-04-30

## 2025-04-29 RX ORDER — ONDANSETRON 2 MG/ML
4 INJECTION INTRAMUSCULAR; INTRAVENOUS EVERY 6 HOURS PRN
Status: DISCONTINUED | OUTPATIENT
Start: 2025-04-29 | End: 2025-04-30

## 2025-04-29 RX ORDER — TURMERIC 400 MG
1 CAPSULE ORAL EVERY MORNING
COMMUNITY

## 2025-04-29 RX ORDER — FENTANYL CITRATE 50 UG/ML
25 INJECTION, SOLUTION INTRAMUSCULAR; INTRAVENOUS ONCE
Status: COMPLETED | OUTPATIENT
Start: 2025-04-29 | End: 2025-04-29

## 2025-04-29 RX ORDER — ONDANSETRON 2 MG/ML
4 INJECTION INTRAMUSCULAR; INTRAVENOUS EVERY 6 HOURS PRN
Status: DISCONTINUED | OUTPATIENT
Start: 2025-04-29 | End: 2025-05-03 | Stop reason: HOSPADM

## 2025-04-29 RX ORDER — DEXAMETHASONE SODIUM PHOSPHATE 4 MG/ML
4 INJECTION, SOLUTION INTRA-ARTICULAR; INTRALESIONAL; INTRAMUSCULAR; INTRAVENOUS; SOFT TISSUE
Status: DISCONTINUED | OUTPATIENT
Start: 2025-04-29 | End: 2025-04-29 | Stop reason: HOSPADM

## 2025-04-29 RX ORDER — HYDROMORPHONE HCL IN WATER/PF 6 MG/30 ML
0.1 PATIENT CONTROLLED ANALGESIA SYRINGE INTRAVENOUS EVERY 4 HOURS PRN
Status: DISCONTINUED | OUTPATIENT
Start: 2025-04-29 | End: 2025-05-03 | Stop reason: HOSPADM

## 2025-04-29 RX ORDER — OMEPRAZOLE 20 MG/1
20 CAPSULE, DELAYED RELEASE ORAL EVERY MORNING
COMMUNITY

## 2025-04-29 RX ORDER — AMOXICILLIN 250 MG
2 CAPSULE ORAL 2 TIMES DAILY PRN
Status: DISCONTINUED | OUTPATIENT
Start: 2025-04-29 | End: 2025-05-03 | Stop reason: HOSPADM

## 2025-04-29 RX ORDER — ACETAMINOPHEN 325 MG/1
975 TABLET ORAL EVERY 8 HOURS
Status: DISCONTINUED | OUTPATIENT
Start: 2025-04-30 | End: 2025-05-03 | Stop reason: HOSPADM

## 2025-04-29 RX ORDER — ONDANSETRON 4 MG/1
4 TABLET, ORALLY DISINTEGRATING ORAL EVERY 30 MIN PRN
Status: DISCONTINUED | OUTPATIENT
Start: 2025-04-29 | End: 2025-04-29 | Stop reason: HOSPADM

## 2025-04-29 RX ORDER — CEFAZOLIN SODIUM/WATER 2 G/20 ML
2 SYRINGE (ML) INTRAVENOUS
Status: COMPLETED | OUTPATIENT
Start: 2025-04-29 | End: 2025-04-29

## 2025-04-29 RX ORDER — HYDROMORPHONE HCL IN WATER/PF 6 MG/30 ML
0.2 PATIENT CONTROLLED ANALGESIA SYRINGE INTRAVENOUS EVERY 4 HOURS PRN
Status: DISCONTINUED | OUTPATIENT
Start: 2025-04-29 | End: 2025-05-03 | Stop reason: HOSPADM

## 2025-04-29 RX ORDER — POLYETHYLENE GLYCOL 3350 17 G/17G
17 POWDER, FOR SOLUTION ORAL DAILY
Status: DISCONTINUED | OUTPATIENT
Start: 2025-04-30 | End: 2025-05-03 | Stop reason: HOSPADM

## 2025-04-29 RX ORDER — MAGNESIUM HYDROXIDE 1200 MG/15ML
LIQUID ORAL PRN
Status: DISCONTINUED | OUTPATIENT
Start: 2025-04-29 | End: 2025-04-29 | Stop reason: HOSPADM

## 2025-04-29 RX ORDER — KETAMINE HYDROCHLORIDE 10 MG/ML
INJECTION INTRAMUSCULAR; INTRAVENOUS PRN
Status: DISCONTINUED | OUTPATIENT
Start: 2025-04-29 | End: 2025-04-29

## 2025-04-29 RX ORDER — POLYETHYLENE GLYCOL 3350 17 G/17G
17 POWDER, FOR SOLUTION ORAL 2 TIMES DAILY PRN
Status: DISCONTINUED | OUTPATIENT
Start: 2025-04-29 | End: 2025-05-03 | Stop reason: HOSPADM

## 2025-04-29 RX ORDER — CALCIUM CARBONATE 500 MG/1
1000 TABLET, CHEWABLE ORAL 4 TIMES DAILY PRN
Status: DISCONTINUED | OUTPATIENT
Start: 2025-04-29 | End: 2025-05-03 | Stop reason: HOSPADM

## 2025-04-29 RX ORDER — ONDANSETRON 4 MG/1
4 TABLET, ORALLY DISINTEGRATING ORAL EVERY 6 HOURS PRN
Status: DISCONTINUED | OUTPATIENT
Start: 2025-04-29 | End: 2025-04-30

## 2025-04-29 RX ORDER — FENTANYL CITRATE 50 UG/ML
INJECTION, SOLUTION INTRAMUSCULAR; INTRAVENOUS PRN
Status: DISCONTINUED | OUTPATIENT
Start: 2025-04-29 | End: 2025-04-29

## 2025-04-29 RX ORDER — ACETAMINOPHEN 650 MG/1
650 SUPPOSITORY RECTAL EVERY 4 HOURS PRN
Status: DISCONTINUED | OUTPATIENT
Start: 2025-04-29 | End: 2025-05-03 | Stop reason: HOSPADM

## 2025-04-29 RX ORDER — NALOXONE HYDROCHLORIDE 1 MG/ML
0.1 INJECTION INTRAMUSCULAR; INTRAVENOUS; SUBCUTANEOUS
Status: DISCONTINUED | OUTPATIENT
Start: 2025-04-29 | End: 2025-04-29 | Stop reason: HOSPADM

## 2025-04-29 RX ORDER — LIDOCAINE 40 MG/G
CREAM TOPICAL
Status: DISCONTINUED | OUTPATIENT
Start: 2025-04-29 | End: 2025-04-30

## 2025-04-29 RX ORDER — BUPIVACAINE HYDROCHLORIDE AND EPINEPHRINE 2.5; 5 MG/ML; UG/ML
INJECTION, SOLUTION EPIDURAL; INFILTRATION; INTRACAUDAL; PERINEURAL PRN
Status: DISCONTINUED | OUTPATIENT
Start: 2025-04-29 | End: 2025-04-29 | Stop reason: HOSPADM

## 2025-04-29 RX ORDER — ONDANSETRON 2 MG/ML
INJECTION INTRAMUSCULAR; INTRAVENOUS PRN
Status: DISCONTINUED | OUTPATIENT
Start: 2025-04-29 | End: 2025-04-29

## 2025-04-29 RX ORDER — ACETAMINOPHEN 325 MG/1
650 TABLET ORAL EVERY 4 HOURS PRN
Status: DISCONTINUED | OUTPATIENT
Start: 2025-04-29 | End: 2025-05-03 | Stop reason: HOSPADM

## 2025-04-29 RX ORDER — OMEPRAZOLE 20 MG/1
20 CAPSULE, DELAYED RELEASE ORAL EVERY MORNING
Status: DISCONTINUED | OUTPATIENT
Start: 2025-04-30 | End: 2025-04-29

## 2025-04-29 RX ORDER — PROPOFOL 10 MG/ML
INJECTION, EMULSION INTRAVENOUS PRN
Status: DISCONTINUED | OUTPATIENT
Start: 2025-04-29 | End: 2025-04-29

## 2025-04-29 RX ORDER — EPHEDRINE SULFATE 50 MG/ML
INJECTION, SOLUTION INTRAMUSCULAR; INTRAVENOUS; SUBCUTANEOUS PRN
Status: DISCONTINUED | OUTPATIENT
Start: 2025-04-29 | End: 2025-04-29

## 2025-04-29 RX ORDER — HYDROMORPHONE HCL IN WATER/PF 6 MG/30 ML
0.4 PATIENT CONTROLLED ANALGESIA SYRINGE INTRAVENOUS
Status: DISCONTINUED | OUTPATIENT
Start: 2025-04-29 | End: 2025-04-30

## 2025-04-29 RX ORDER — CEFAZOLIN SODIUM 1 G/3ML
1 INJECTION, POWDER, FOR SOLUTION INTRAMUSCULAR; INTRAVENOUS EVERY 8 HOURS
Status: COMPLETED | OUTPATIENT
Start: 2025-04-30 | End: 2025-04-30

## 2025-04-29 RX ORDER — ONDANSETRON 2 MG/ML
4 INJECTION INTRAMUSCULAR; INTRAVENOUS EVERY 30 MIN PRN
Status: DISCONTINUED | OUTPATIENT
Start: 2025-04-29 | End: 2025-04-29 | Stop reason: HOSPADM

## 2025-04-29 RX ORDER — CHLORAL HYDRATE 500 MG
1 CAPSULE ORAL EVERY MORNING
COMMUNITY

## 2025-04-29 RX ORDER — LIDOCAINE 40 MG/G
CREAM TOPICAL
Status: DISCONTINUED | OUTPATIENT
Start: 2025-04-29 | End: 2025-04-29 | Stop reason: HOSPADM

## 2025-04-29 RX ORDER — VANCOMYCIN HYDROCHLORIDE 1 G/20ML
INJECTION, POWDER, LYOPHILIZED, FOR SOLUTION INTRAVENOUS PRN
Status: DISCONTINUED | OUTPATIENT
Start: 2025-04-29 | End: 2025-04-29 | Stop reason: HOSPADM

## 2025-04-29 RX ORDER — SODIUM CHLORIDE, SODIUM LACTATE, POTASSIUM CHLORIDE, CALCIUM CHLORIDE 600; 310; 30; 20 MG/100ML; MG/100ML; MG/100ML; MG/100ML
INJECTION, SOLUTION INTRAVENOUS CONTINUOUS
Status: DISCONTINUED | OUTPATIENT
Start: 2025-04-29 | End: 2025-04-30

## 2025-04-29 RX ORDER — ASPIRIN 81 MG/1
81 TABLET ORAL 2 TIMES DAILY
Status: DISCONTINUED | OUTPATIENT
Start: 2025-04-30 | End: 2025-05-03 | Stop reason: HOSPADM

## 2025-04-29 RX ORDER — HYDRALAZINE HYDROCHLORIDE 10 MG/1
10 TABLET, FILM COATED ORAL EVERY 4 HOURS PRN
Status: DISCONTINUED | OUTPATIENT
Start: 2025-04-29 | End: 2025-04-30

## 2025-04-29 RX ORDER — CEFAZOLIN SODIUM/WATER 2 G/20 ML
2 SYRINGE (ML) INTRAVENOUS SEE ADMIN INSTRUCTIONS
Status: DISCONTINUED | OUTPATIENT
Start: 2025-04-29 | End: 2025-04-29 | Stop reason: HOSPADM

## 2025-04-29 RX ORDER — FENTANYL CITRATE 50 UG/ML
50 INJECTION, SOLUTION INTRAMUSCULAR; INTRAVENOUS EVERY 5 MIN PRN
Status: DISCONTINUED | OUTPATIENT
Start: 2025-04-29 | End: 2025-04-29 | Stop reason: HOSPADM

## 2025-04-29 RX ORDER — TRANEXAMIC ACID 650 MG/1
1950 TABLET ORAL ONCE
Status: COMPLETED | OUTPATIENT
Start: 2025-04-29 | End: 2025-04-29

## 2025-04-29 RX ORDER — FENTANYL CITRATE 50 UG/ML
25 INJECTION, SOLUTION INTRAMUSCULAR; INTRAVENOUS
Status: DISCONTINUED | OUTPATIENT
Start: 2025-04-29 | End: 2025-04-29

## 2025-04-29 RX ADMIN — FENTANYL CITRATE 25 MCG: 50 INJECTION, SOLUTION INTRAMUSCULAR; INTRAVENOUS at 16:47

## 2025-04-29 RX ADMIN — FENTANYL CITRATE 25 MCG: 50 INJECTION, SOLUTION INTRAMUSCULAR; INTRAVENOUS at 20:44

## 2025-04-29 RX ADMIN — FENTANYL CITRATE 50 MCG: 50 INJECTION, SOLUTION INTRAMUSCULAR; INTRAVENOUS at 23:20

## 2025-04-29 RX ADMIN — FENTANYL CITRATE 25 MCG: 50 INJECTION, SOLUTION INTRAMUSCULAR; INTRAVENOUS at 15:14

## 2025-04-29 RX ADMIN — PHENYLEPHRINE HYDROCHLORIDE 100 MCG: 10 INJECTION INTRAVENOUS at 21:10

## 2025-04-29 RX ADMIN — PROPOFOL 100 MG: 10 INJECTION, EMULSION INTRAVENOUS at 20:44

## 2025-04-29 RX ADMIN — SODIUM CHLORIDE, SODIUM LACTATE, POTASSIUM CHLORIDE, AND CALCIUM CHLORIDE: .6; .31; .03; .02 INJECTION, SOLUTION INTRAVENOUS at 19:08

## 2025-04-29 RX ADMIN — Medication 10 MG: at 21:22

## 2025-04-29 RX ADMIN — Medication 10 MG: at 21:18

## 2025-04-29 RX ADMIN — LIDOCAINE HYDROCHLORIDE 5 ML: 10 INJECTION, SOLUTION INFILTRATION; PERINEURAL at 20:44

## 2025-04-29 RX ADMIN — DEXAMETHASONE SODIUM PHOSPHATE 10 MG: 10 INJECTION, SOLUTION INTRAMUSCULAR; INTRAVENOUS at 20:44

## 2025-04-29 RX ADMIN — SODIUM CHLORIDE, SODIUM LACTATE, POTASSIUM CHLORIDE, AND CALCIUM CHLORIDE 75 ML/HR: .6; .31; .03; .02 INJECTION, SOLUTION INTRAVENOUS at 23:28

## 2025-04-29 RX ADMIN — Medication 5 MG: at 21:14

## 2025-04-29 RX ADMIN — FENTANYL CITRATE 50 MCG: 50 INJECTION, SOLUTION INTRAMUSCULAR; INTRAVENOUS at 22:57

## 2025-04-29 RX ADMIN — FENTANYL CITRATE 50 MCG: 50 INJECTION, SOLUTION INTRAMUSCULAR; INTRAVENOUS at 13:48

## 2025-04-29 RX ADMIN — PHENYLEPHRINE HYDROCHLORIDE 100 MCG: 10 INJECTION INTRAVENOUS at 21:18

## 2025-04-29 RX ADMIN — ALBUMIN HUMAN: 0.05 INJECTION, SOLUTION INTRAVENOUS at 21:24

## 2025-04-29 RX ADMIN — PHENYLEPHRINE HYDROCHLORIDE 100 MCG: 10 INJECTION INTRAVENOUS at 21:22

## 2025-04-29 RX ADMIN — PHENYLEPHRINE HYDROCHLORIDE 100 MCG: 10 INJECTION INTRAVENOUS at 21:08

## 2025-04-29 RX ADMIN — Medication 2 G: at 20:37

## 2025-04-29 RX ADMIN — TRANEXAMIC ACID 1950 MG: 650 TABLET ORAL at 19:07

## 2025-04-29 RX ADMIN — PHENYLEPHRINE HYDROCHLORIDE 100 MCG: 10 INJECTION INTRAVENOUS at 22:19

## 2025-04-29 RX ADMIN — PHENYLEPHRINE HYDROCHLORIDE 0.2 MCG/KG/MIN: 10 INJECTION INTRAVENOUS at 21:01

## 2025-04-29 RX ADMIN — ONDANSETRON 4 MG: 2 INJECTION INTRAMUSCULAR; INTRAVENOUS at 22:26

## 2025-04-29 RX ADMIN — FENTANYL CITRATE 50 MCG: 50 INJECTION, SOLUTION INTRAMUSCULAR; INTRAVENOUS at 21:42

## 2025-04-29 RX ADMIN — BUPIVACAINE HYDROCHLORIDE 30 ML: 5 INJECTION, SOLUTION EPIDURAL; INTRACAUDAL; PERINEURAL at 20:46

## 2025-04-29 ASSESSMENT — ACTIVITIES OF DAILY LIVING (ADL)
ADLS_ACUITY_SCORE: 45
ADLS_ACUITY_SCORE: 23
ADLS_ACUITY_SCORE: 45
ADLS_ACUITY_SCORE: 23
ADLS_ACUITY_SCORE: 41

## 2025-04-29 ASSESSMENT — COPD QUESTIONNAIRES: COPD: 0

## 2025-04-29 ASSESSMENT — ENCOUNTER SYMPTOMS: SEIZURES: 0

## 2025-04-29 NOTE — H&P
[facility]    History and Physical - Hospitalist Service       Date of Admission:  4/29/2025    Assessment & Plan      Carlos Faith is a 89 year old male admitted on 4/29/2025. PMHx of CKD3a, HLD, HTN, GERD, mood disorder. No history of heart or lung disease. He presents to ED via EMS after a mechanical fall playing pickle ball earlier today landing on his right hip.  XR right hip indicates right hip fracture.  Ortho following - plan to take patient to surgery later this evening.  NPO for procedure.  PT/OT consults in the morning.       Right hip fracture  Mechanical fall  Patient playing pickle ball earlier today, turned to his right to run and tripped over his feet onto his right hip.  Patient reports instant pain and inability to ambulate.  Arrived to ED via EMS.  Denies head trauma or loss of consciousness.  -- XR pelvis and R hip 4/29: Comminuted intertrochanteric and subtrochanteric right hip fracture. There is 1 cm displacement and impaction. There is mild apex superior lateral angulation. Diffuse bone demineralization.  -- XR R femur 4/29: Acute, mildly displaced and foreshortened intertrochanteric / subtrochanteric fracture of the right proximal femur. No additional fracture. Demineralization.  -- Given fentanyl IV in ED with benefit   -- Ortho recommendations: surgery tonight, continue NPO, pain control, NWB RLE  -- Pain control: PO tylenol PRN, IV dilaudid PRN, PO oxycodone PRN, cold packs  -- Nausea medications and bowel regimen  -- Fall precautions  -- PT / OT consults after surgery  -- CM to assist in disposition   -- NPO for surgery     Preoperative evaluation; patient moderate risk to undergo urgent surgical intervention, and is without significant cardiac risk factors. No history of heart disease. EKG reviewed, no acute ischemic changes. No family history of malignant hyperthermia. No previous issues with sedation. Medically optimized to undergo urgent surgical  intervention at this time.      CKD3a  Baseline Cr ~ 1.0-1.1, GRF > 60  -- Admission Cr 1.18, GFR 59  -- Monitor     Hypertension   Well-controlled with diet / regular exercise per chart review and discussion with patient   -- Elevated on presentation, likely due to pain  -- Pain control as above  -- Continue to monitor     Hyperlipidemia -- Not currently taking medication for this, recently initiated diet changes and increased exercise since January 2025.   Mood disorder -- Continue PTA amitriptyline  Duodenal Ulcer / GERD -- Continue PTA omeprazole             Diet: NPO for Procedure/Surgery per Anesthesia Guidelines Except for: Meds, Ice Chips; Clear liquids before procedure/surgery: NO clear liquids before procedure/surgery    DVT Prophylaxis: Pneumatic Compression Devices  Peterson Catheter: Not present  Lines: None     Cardiac Monitoring: None  Code Status: Full Code    Clinically Significant Risk Factors Present on Admission          # Hyperchloremia: Highest Cl = 109 mmol/L in last 2 days, will monitor as appropriate                                   Disposition Plan     Medically Ready for Discharge: Anticipated in 2-4 Days       The patient's care was discussed with the Attending Physician, Dr. Cummins who independently met with and assessed the patient and is agreement with the assessment and plan.  Additionally, care discussed with patient.    Justina Salinas PA-C  Hospitalist Service  [facility]  Securely message with [employer] (more info)  Text page via [employer] Paging/Directory     ______________________________________________________________________    Chief Complaint   Fall and Hip Pain    History is obtained from the patient    History of Present Illness   Carlos Faith is a 89 year old male who presents to ED via EMS after a fall while playing pickle ball earlier today.  Patient reports falling, by tripping over his feet after he turned to the right, and falling on his right  hip.  Pain started instantly, and continued to worsen with any movement.  Once in the ED, patient given some pain control, for which has been very beneficial.  Patient denies head trauma or loss of consciousness.  No lower extremity numbness or tingling.  Reports no lower extremity weakness prior to the fall.  Patient does not use a cane or a walker, and is rather independent of all ADLs at home.  He additionally takes care of his wife, who has multiple medical comorbidities.  Denies fever/chills, headache or recent illness.  No chest pain, palpitations or shortness of breath.  Denies bladder or bowel habit changes.  No abdominal pain.  Was nauseous earlier today, but this has subsided.  Patient has not eaten since breakfast. No blood thinners.     No smoking history.  Patient denies any history of heart disease, heart failure, or heart attack.  Patient's father  of a heart attack at 83 years old.  No lung disease including asthma or COPD.  Denies history of stroke.      Past Medical History    Past Medical History:   Diagnosis Date    Anxiety     Duodenal ulcer     GERD without esophagitis     Hyperlipidemia     Prostate cancer (H)      Past Surgical History   Past Surgical History:   Procedure Laterality Date     penile implant      CATARACT IOL, RT/LT      CYSTOSCOPY, DILATE URETHRA, COMBINED N/A 2017    Procedure: COMBINED CYSTOSCOPY, DILATE URETHRA;  Cysto, urethral ballon dilation and Holmium laser Lithotripsy;  Surgeon: Juanito Vaughan MD;  Location: [facility] OR    HERNIA REPAIR, INGUINAL RT/LT      PROSTATE SURGERY      Prostatectomy     Prior to Admission Medications   Prior to Admission Medications   Prescriptions Last Dose Informant Patient Reported? Taking?   Turmeric 400 MG CAPS 2025 Morning  Yes Yes   Sig: Take 1 capsule by mouth every morning.   amitriptyline (ELAVIL) 25 MG tablet 2025 Bedtime  No Yes   Sig: Take 1 tablet (25 mg) by mouth at bedtime.   fish oil-omega-3  fatty acids 1000 MG capsule 4/29/2025 Morning  Yes Yes   Sig: Take 1 g by mouth every morning.   glucosamine-chondroitin 500-400 MG CAPS per capsule 4/29/2025 Morning  Yes Yes   Sig: Take 1 capsule by mouth every morning.   multivitamin w/minerals (THERA-VIT-M) tablet 4/29/2025 Morning  Yes Yes   Sig: Take 1 tablet by mouth every morning.   omeprazole (PRILOSEC) 20 MG DR capsule 4/29/2025 Morning  Yes Yes   Sig: Take 20 mg by mouth every morning.      Facility-Administered Medications: None        Review of Systems    The 10 point Review of Systems is negative other than noted in the HPI or here.     Social History   I have reviewed this patient's social history and updated it with pertinent information if needed.  Social History     Tobacco Use    Smoking status: Never    Smokeless tobacco: Never   Vaping Use    Vaping status: Never Used   Substance Use Topics    Alcohol use: Yes     Alcohol/week: 0.0 standard drinks of alcohol     Comment: Wine    Drug use: No       Family History   I have reviewed this patient's family history and updated it with pertinent information if needed.  Family History   Problem Relation Age of Onset    Coronary Artery Disease Father         83 MI    Prostate Cancer Paternal Grandfather        Allergies   Allergies   Allergen Reactions    Sulfa Antibiotics Rash        Physical Exam   Vital Signs: Temp: 98.1  F (36.7  C) Temp src: Oral BP: (!) 142/83 Pulse: 98   Resp: 16 SpO2: 97 %      Weight: 142 lbs 0 oz    Physical Exam  GENERAL: Well-developed, well-nourished elderly male in no apparent distress.   EYES: Lids and lashes normal. Pupils equal. No conjunctivitis, injection or icterus.   HENT: Normocephalic, atraumatic. Mucous membranes moist.  RESPIRATORY: CTAB. Equal breath sounds without accessory muscle use. No rhonchi, wheezes, or rales.   CARDIOVASCULAR: RRR, 2/6 JESSICA. No gallops or rubs. S1/S2 present.   ABDOMEN: Bowel sounds present, soft and non-tender to palpation.     EXTREMITIES: No lower extremity edema. RLE externally rotated at the hip.  Right hip quite TTP.  RLE ROM not tested given fracture.  LLE ROM limited due to fracture, and bedrest status.  Upper extremities move spontaneously.  NEURO: Alert and oriented x 3. Sensation intact bilaterally to light touch.  No focal neurologic deficit.  SKIN: No rashes or lesions to exposed skin.   PSYCH: Appropriate mood and affect. Pleasant and cooperative.       Medical Decision Making       65 MINUTES SPENT BY ME on the date of service doing chart review, history, exam, documentation & further activities per the note.      Data   ------------------------- PAST 24 HR DATA REVIEWED -----------------------------------------------    I have personally reviewed the following data over the past 24 hrs:    10.4  \   13.2 (L)   / 201     141 109 (H) 23.4 (H) /  138 (H)   4.1 26 1.18 (H) \     INR:  1.03 PTT:  24   D-dimer:  N/A Fibrinogen:  N/A       Imaging results reviewed over the past 24 hrs:   Recent Results (from the past 24 hours)   XR Pelvis and Hip Right 2 Views    Narrative    EXAM: XR PELVIS AND HIP RIGHT 2 VIEWS  LOCATION: [facility]  DATE: 4/29/2025    INDICATION: Hip pain, fall.  COMPARISON: None.      Impression    IMPRESSION: Comminuted intertrochanteric and subtrochanteric right hip fracture. There is about 1 cm of displacement and impaction. There is mild apex superolateral angulation. Scoliosis and degenerative changes in the spine. Diffuse bone   demineralization.   XR Femur Right 2 Views    Narrative    EXAM: XR FEMUR RIGHT 2 VIEWS  LOCATION: [facility]  DATE: 4/29/2025    INDICATION: R hip fx  COMPARISON: 04/29/2025      Impression    IMPRESSION: Acute, mildly displaced and foreshortened intertrochanteric/subtrochanteric fracture of the right proximal femur. No additional fracture. Demineralization. Arterial calcifications.

## 2025-04-29 NOTE — ED PROVIDER NOTES
EMERGENCY DEPARTMENT ENCOUNTER     NAME: Carlos Faith   AGE: 89 year old male   YOB: 1935   MRN: [id number]   EVALUATION DATE & TIME: 4/29/2025 12:58 PM   PCP: Antoinette Bagley     Chief Complaint   Patient presents with    Fall    Hip Pain   :    FINAL IMPRESSION       No diagnosis found.   Hip injury    ED COURSE & MEDICAL DECISION MAKING      Pertinent Labs & Imaging studies reviewed. (See chart for details)   89 year old male  presents to the Emergency Department for evaluation of mechanical fall while playing pickle ball now complaining of right hip pain. Initial Vitals Reviewed. Initial exam notable for nontoxic male who looks much younger than stated age and is normally ambulatory, who has an externally rotated but not shortened and neurovascularly intact right lower extremity and tenderness that is greatest over the anterior right hip.  I strongly suspect fracture versus dislocation.  I did order preoperative lab studies and he received fentanyl from EMS and then ultimately 2 other doses here in the ED while awaiting x-ray imaging.  Unfortunately at time of signout there is been a radiology delay due to volume of patients, but I anticipate that he is going to end up needing admission and him strongly suspicious of a fracture.  Patient signed out to oncoming provider awaiting x-ray results.        1:01 PM I met with the patient, obtained history, performed an initial exam, and discussed options and plan for diagnostics and treatment here in the ED.    At the conclusion of the encounter I discussed the results of all of the tests and the disposition. The questions were answered. The patient or family acknowledged understanding and was agreeable with the care plan.     0 minutes critical care time, see procedure note below for details if relevant    Medical Decision Making  I obtained history from EMS  Care impacted by Chronic Kidney Disease  Admission considered. Patient was signed  out to the oncoming physician, disposition pending.    MIPS (CTPE, Dental pain, Peterson, Sinusitis, Asthma/COPD, Head Trauma): Not Applicable    SEPSIS: None              MEDICATIONS GIVEN IN THE EMERGENCY:   Medications - No data to display   NEW PRESCRIPTIONS STARTED AT TODAY'S ER VISIT   New Prescriptions    No medications on file     ================================================================   HISTORY OF PRESENT ILLNESS       Patient information was obtained from: Patient and EMS    Use of Intrepreter: N/A   Carlos Faith is a 89 year old male with history of hyperlipidemia, CKD stage 3a, who presents fall, right hip pain.     Patient was playing pickleball when he fell, landing on his right hip. He endorses pain in his right anterior hip but denies hitting his head, loss of consciousness, or any other concerns at this time. Patient does not take any blood thinning medications.      ================================================================  PAST HISTORY     PAST MEDICAL HISTORY:   Past Medical History:   Diagnosis Date    Anxiety     Duodenal ulcer     GERD without esophagitis     Hyperlipidemia     Prostate cancer (H) 1994      PAST SURGICAL HISTORY:   Past Surgical History:   Procedure Laterality Date     penile implant  1994    CATARACT IOL, RT/LT      CYSTOSCOPY, DILATE URETHRA, COMBINED N/A 6/1/2017    Procedure: COMBINED CYSTOSCOPY, DILATE URETHRA;  Cysto, urethral ballon dilation and Holmium laser Lithotripsy;  Surgeon: Juanito Vaughan MD;  Location: [facility] OR    HERNIA REPAIR, INGUINAL RT/LT      PROSTATE SURGERY  1994    Prostatectomy      CURRENT MEDICATIONS:   amitriptyline (ELAVIL) 25 MG tablet  carbamide peroxide (DEBROX) 6.5 % otic solution  co-enzyme Q-10 100 MG CAPS capsule  multivitamin w/minerals (THERA-VIT-M) tablet  NEW MED  omeprazole (PRILOSEC) 20 MG DR capsule  SLO-NIACIN PO  TURMERIC PO  VITAMIN D, ERGOCALCIFEROL, PO  Zinc Sulfate (ZINC 15 PO)      ALLERGIES:   Allergies    Allergen Reactions    Sulfa Antibiotics Rash      FAMILY HISTORY:   Family History   Problem Relation Age of Onset    Coronary Artery Disease Father         83 MI    Prostate Cancer Paternal Grandfather       SOCIAL HISTORY:   Social History     Socioeconomic History    Marital status:    Occupational History     Employer: RETIRED   Tobacco Use    Smoking status: Never    Smokeless tobacco: Never   Vaping Use    Vaping status: Never Used   Substance and Sexual Activity    Alcohol use: Yes     Alcohol/week: 0.0 standard drinks of alcohol     Comment: Wine    Drug use: No    Sexual activity: Not Currently     Partners: Female   Other Topics Concern    Parent/sibling w/ CABG, MI or angioplasty before 65F 55M? No     Social Drivers of Health     Financial Resource Strain: Low Risk  (1/6/2025)    Financial Resource Strain     Within the past 12 months, have you or your family members you live with been unable to get utilities (heat, electricity) when it was really needed?: No   Food Insecurity: Low Risk  (1/6/2025)    Food Insecurity     Within the past 12 months, did you worry that your food would run out before you got money to buy more?: No     Within the past 12 months, did the food you bought just not last and you didn t have money to get more?: No   Transportation Needs: Low Risk  (1/6/2025)    Transportation Needs     Within the past 12 months, has lack of transportation kept you from medical appointments, getting your medicines, non-medical meetings or appointments, work, or from getting things that you need?: No   Physical Activity: Sufficiently Active (1/6/2025)    Exercise Vital Sign     Days of Exercise per Week: 7 days     Minutes of Exercise per Session: 60 min   Stress: No Stress Concern Present (1/6/2025)    [employer] - Occupational Stress Questionnaire     Feeling of Stress : Not at all   Social Connections: Unknown (1/6/2025)    Social Connection and Isolation  Panel [NHANES]     Frequency of Social Gatherings with Friends and Family: Three times a week   Interpersonal Safety: Low Risk  (1/6/2025)    Interpersonal Safety     Do you feel physically and emotionally safe where you currently live?: Yes     Within the past 12 months, have you been hit, slapped, kicked or otherwise physically hurt by someone?: No     Within the past 12 months, have you been humiliated or emotionally abused in other ways by your partner or ex-partner?: No   Housing Stability: Low Risk  (1/6/2025)    Housing Stability     Do you have housing? : Yes     Are you worried about losing your housing?: No        VITALS  No data found.     ================================================================  PHYSICAL EXAM     VITAL SIGNS: There were no vitals taken for this visit.   Constitutional:  Awake, no acute distress   HENT:  Atraumatic, oropharynx without exudate or erythema, membranes moist  Lymph:  No adenopathy  Eyes: EOM intact, PERRL, no injection  Neck: Supple  Respiratory:  Clear to auscultation bilaterally, no wheezes or crackles   Cardiovascular:  Regular rate and rhythm, single S1 and S2   GI:  Soft, nontender, nondistended, no rebound or guarding   Musculoskeletal:  No lower extremity edema, right lower extremity externally rotated, tenderness over anterior right hip    Skin:  Warm, dry  Neurologic:  Alert and oriented x3, no focal deficits noted       ================================================================  LAB       All pertinent labs reviewed and interpreted.   Labs Ordered and Resulted from Time of ED Arrival to Time of ED Departure   BASIC METABOLIC PANEL - Abnormal       Result Value    Sodium 141      Potassium 4.1      Chloride 109 (*)     Carbon Dioxide (CO2) 26      Anion Gap 6 (*)     Urea Nitrogen 23.4 (*)     Creatinine 1.18 (*)     GFR Estimate 59 (*)     Calcium 8.9      Glucose 138 (*)    CBC WITH PLATELETS AND DIFFERENTIAL - Abnormal    WBC Count 10.4      RBC  Count 4.24 (*)     Hemoglobin 13.2 (*)     Hematocrit 39.2 (*)     MCV 93      MCH 31.1      MCHC 33.7      RDW 14.0      Platelet Count 201      % Neutrophils 86      % Lymphocytes 6      % Monocytes 6      % Eosinophils 0      % Basophils 1      % Immature Granulocytes 1      NRBCs per 100 WBC 0      Absolute Neutrophils 9.0 (*)     Absolute Lymphocytes 0.6 (*)     Absolute Monocytes 0.7      Absolute Eosinophils 0.0      Absolute Basophils 0.1      Absolute Immature Granulocytes 0.1      Absolute NRBCs 0.0     PARTIAL THROMBOPLASTIN TIME - Normal    aPTT 24     INR - Normal    INR 1.03      PT 13.8     TYPE AND SCREEN, ADULT    ABO/RH(D) O POS      Antibody Screen Negative      SPECIMEN EXPIRATION DATE [id number]     ABO/RH TYPE AND SCREEN        ===============================================================  RADIOLOGY       Reviewed all pertinent imaging. Please see official radiology report.   XR Pelvis and Hip Right 2 Views    (Results Pending)         ================================================================  EKG         I have independently reviewed and interpreted the EKG(s) documented above.     ================================================================  PROCEDURES         I, Berenice Rubalcava, am serving as a scribe to document services personally performed by Dr. Johansen based on my observation and the provider's statements to me. I, Penelope Johansen MD attest that Berenice Rubalcava is acting in a scribe capacity, has observed my performance of the services and has documented them in accordance with my direction.     Penelope Johansen M.D.   Emergency Medicine   [facility] EMERGENCY DEPARTMENT  [address] [place] [id number]  [phone]  Dept: [phone]      Penelope Johansen MD  04/29/25 1503

## 2025-04-29 NOTE — H&P (VIEW-ONLY)
ORTHOPEDIC CONSULTATION    Consultation  Carlos Faith,  1935, MRN [id number]    [place]  Hip fracture, right, closed, initial encounter (H) [S72.001A]    PCP: Antoinette Bagley, [phone]   Code status:  No Order       Extended Emergency Contact Information  Primary Emergency Contact: Elma Faith  Address: [address] [place] [address] [facility]  Mobile Phone: [phone]  Relation: Spouse  Secondary Emergency Contact: Van Murphy  Address: [address] [place] [address] [facility]  Mobile Phone: [phone]  Relation: Son         IMPRESSION:  89-year-old male with acute right closed displaced intertrochanteric fracture      PLAN:  This patient was discussed with Dr. Oswald, on-call surgeon for [facility] and they are in agreement with the following plan.    - Radiographs demonstrate right intertrochanteric femur fracture. Discussed treatment options including surgical intervention with intramedullary nail. Discussed risks of procedure including but not limited to, injury to nerves, arteries or veins, malunion, nonunion, periprosthetic fracture, DVT or even death with the patient and they are in agreement with proceeding.  - Will take to the OR tonight  - Continue NPO.   - Medical optimization per hospitalist  - Pain control as needed  - NWB RLE    Thank you for including [facility] in the care of Carlos Faith. It has been a pleasure participating in Carlos' care.      CHIEF COMPLAINT: <principal problem not specified>    HISTORY OF PRESENT ILLNESS:  The patient is seen in orthopedic consultation at the request of Dr. Longoria.  The patient is a 89 year old male with moderate pain of the right  hip. The patient reports that he was playing pickleball today when he ran for the ball and fell onto the right hip. Presented to ED. Last meal was at 7 this morning. NO prior hip pain. Hx of gastric  ulcer managed on omeprazole otherwise healthy. Unable to ambulate secondary to pain. No blood thinners.    PAST MEDICAL HISTORY:  Active Ambulatory Problems     Diagnosis Date Noted    H/O prostate cancer 01/15/2016    History of penile implant 01/15/2016    Mixed hyperlipidemia 01/15/2016    Gastroesophageal reflux disease without esophagitis 05/10/2016    Moderate single current episode of major depressive disorder (H) 01/04/2018    KANDI (generalized anxiety disorder) 01/24/2018    Stage 3a chronic kidney disease (H) 01/06/2025     Resolved Ambulatory Problems     Diagnosis Date Noted    Adjustment disorder with anxious mood 01/15/2016    Chronic pain of right knee 07/10/2020    Chronic kidney disease, stage 3 (H) 01/22/2021     Past Medical History:   Diagnosis Date    Anxiety     Duodenal ulcer     GERD without esophagitis     Hyperlipidemia     Prostate cancer (H) 1994            ALLERGIES:   Review of patient's allergies indicates   Allergies   Allergen Reactions    Sulfa Antibiotics Rash         MEDICATIONS UPON ADMISSION:  Medications were reviewed.  They include:   (Not in a hospital admission)        SOCIAL HISTORY:   he  reports that he has never smoked. He has never used smokeless tobacco. He reports current alcohol use. He reports that he does not use drugs.    FAMILY HISTORY:  family history includes Coronary Artery Disease in his father; Prostate Cancer in his paternal grandfather.      REVIEW OF SYSTEMS:   Reviewed with patient. See HPI, otherwise negative      PHYSICAL EXAMINATION:  Vitals: Temp:  [98.1  F (36.7  C)] 98.1  F (36.7  C)  Pulse:  [42-98] 98  Resp:  [16] 16  BP: ()/(52-85) 142/83  SpO2:  [93 %-100 %] 97 %  General: On examination, the patient is resting comfortably, NAD and awake, lying supine in bed and oriented to person, place and time   SKIN: No significant ecchymosis at hip, skin intact  Pulses:  Palpable bilateral dorsalis pedis pulses.  Sensation: intact and equal  bilaterally to the distal lower extremities, feet and toes.  Tenderness: Right hip tenderness noted. No tenderness noted in calfs bilaterally  ROM: Unable to lift left leg off bed due to pain  Motor: Equal ankle plantar and dorsiflexion, moves all toes bilaterally   Extremity: Right lower extremity externally rotated & shortened, thigh & calf compartments soft      RADIOGRAPHIC EVALUATION:  Personally reviewed    EXAM: XR FEMUR RIGHT 2 VIEWS  LOCATION: [facility]  DATE: 4/29/2025     INDICATION: R hip fx  COMPARISON: 04/29/2025                                                                      IMPRESSION: Acute, mildly displaced and foreshortened intertrochanteric/subtrochanteric fracture of the right proximal femur. No additional fracture. Demineralization. Arterial calcifications.    PERTINENT LABS:  Lab Results: personally reviewed.   Lab Results   Component Value Date    WBC 10.4 04/29/2025    WBC 5.0 11/17/2020    HGB 13.2 04/29/2025    HGB 15.9 11/17/2020    HCT 39.2 04/29/2025    HCT 46.9 11/17/2020    MCV 93 04/29/2025    MCV 92 11/17/2020     04/29/2025     11/17/2020         LATHA HARRIS PA-C  Date: 4/29/2025  Time: 6:06 PM    CC1:   Danyelle Cummins MD    CC2:   Antoinette Bagley

## 2025-04-29 NOTE — ED NOTES
"Pt back from imaging, states his pain is 7/10 but \"it's fine as long as I don't move\". VSS. Results pending. Family at bedside.  "

## 2025-04-29 NOTE — ANESTHESIA PREPROCEDURE EVALUATION
Anesthesia Pre-Procedure Evaluation    Patient: Carlos Faith   MRN: [id number] : 1935        Procedure : Procedure(s):  INTERNAL FIXATION, FRACTURE, TROCHANTERIC, HIP, USING PINS OR RODS          Past Medical History:   Diagnosis Date    Anxiety     Duodenal ulcer     GERD without esophagitis     Hyperlipidemia     Prostate cancer (H)       Past Surgical History:   Procedure Laterality Date     penile implant      CATARACT IOL, RT/LT      CYSTOSCOPY, DILATE URETHRA, COMBINED N/A 2017    Procedure: COMBINED CYSTOSCOPY, DILATE URETHRA;  Cysto, urethral ballon dilation and Holmium laser Lithotripsy;  Surgeon: Juanito Vaughan MD;  Location: [facility] OR    HERNIA REPAIR, INGUINAL RT/LT      PROSTATE SURGERY      Prostatectomy      Allergies   Allergen Reactions    Sulfa Antibiotics Rash      Social History     Tobacco Use    Smoking status: Never    Smokeless tobacco: Never   Substance Use Topics    Alcohol use: Yes     Alcohol/week: 0.0 standard drinks of alcohol     Comment: Wine      Wt Readings from Last 1 Encounters:   25 64.4 kg (142 lb)        Anesthesia Evaluation            ROS/MED HX  ENT/Pulmonary:    (-) COPD and sleep apnea   Neurologic:    (-) no seizures and no CVA   Cardiovascular: Comment: SR with PACs and PVCs   (-) hypertension and CHF   METS/Exercise Tolerance: >4 METS    Hematologic:       Musculoskeletal:       GI/Hepatic:     (+) GERD,                   Renal/Genitourinary:     (+) renal disease,             Endo:    (-) Type II DM   Psychiatric/Substance Use:    (-) psychiatric history   Infectious Disease:       Malignancy:   (+) Malignancy, History of Prostate.    Other:            Physical Exam    Airway  airway exam normal      Mallampati: II   TM distance: > 3 FB   Neck ROM: full   Mouth opening: > 3 cm    Respiratory Devices and Support         Dental       (+) Completely normal teeth      Cardiovascular   cardiovascular exam normal          Pulmonary    "pulmonary exam normal                OUTSIDE LABS:  CBC:   Lab Results   Component Value Date    WBC 10.4 04/29/2025    WBC 6.6 01/06/2025    HGB 13.2 (L) 04/29/2025    HGB 15.5 01/06/2025    HCT 39.2 (L) 04/29/2025    HCT 44.9 01/06/2025     04/29/2025     01/06/2025     BMP:   Lab Results   Component Value Date     04/29/2025     01/06/2025    POTASSIUM 4.1 04/29/2025    POTASSIUM 4.4 01/06/2025    CHLORIDE 109 (H) 04/29/2025    CHLORIDE 104 01/06/2025    CO2 26 04/29/2025    CO2 25 01/06/2025    BUN 23.4 (H) 04/29/2025    BUN 25.0 (H) 01/06/2025    CR 1.18 (H) 04/29/2025    CR 1.08 01/06/2025     (H) 04/29/2025     (H) 01/06/2025     COAGS:   Lab Results   Component Value Date    PTT 24 04/29/2025    INR 1.03 04/29/2025     POC: No results found for: \"BGM\", \"HCG\", \"HCGS\"  HEPATIC:   Lab Results   Component Value Date    ALBUMIN 4.3 01/06/2025    PROTTOTAL 7.3 01/06/2025    ALT 13 01/06/2025    AST 23 01/06/2025    ALKPHOS 116 01/06/2025    BILITOTAL 0.6 01/06/2025     OTHER:   Lab Results   Component Value Date    A1C 5.1 01/06/2025    GUSTAVO 8.9 04/29/2025    PHOS 4.0 11/17/2022    MAG 2.0 11/17/2022    TSH 2.47 11/17/2022       Anesthesia Plan    ASA Status:  3    NPO Status:  NPO Appropriate    Anesthesia Type: General.     - Airway: LMA   Induction: Intravenous.   Maintenance: Balanced.        Consents    Anesthesia Plan(s) and associated risks, benefits, and realistic alternatives discussed. Questions answered and patient/representative(s) expressed understanding.     - Discussed: Risks, Benefits and Alternatives for BOTH SEDATION and the PROCEDURE were discussed     - Discussed with:  Patient       Use of blood products discussed: Yes.     - Discussed with: Patient.     - Consented: consented to blood products     Postoperative Care    Pain management: IV analgesics, Multi-modal analgesia, Oral pain medications, Peripheral nerve block (Single Shot).   PONV prophylaxis: " Ondansetron (or other 5HT-3)     Comments:    Other Comments: Right fascia iliaca block after induction           Gui Jauregui MD    Clinically Significant Risk Factors Present on Admission          # Hyperchloremia: Highest Cl = 109 mmol/L in last 2 days, will monitor as appropriate

## 2025-04-29 NOTE — MEDICATION SCRIBE - ADMISSION MEDICATION HISTORY
Medication Scribe Admission Medication History    Admission medication history is complete. The information provided in this note is only as accurate as the sources available at the time of the update.    Information Source(s): Patient and CareEverywhere/SureScripts via in-person    Pertinent Information: Patient manages his own medications at home. Pt reports taking AM meds today 4/29/25.  Amitriptyline  -pt reports taking 25 mg HS to help sleep. Last filled 12/16/24 for 90 ds 90 tabs.    Changes made to PTA medication list:  Added: .  Glucosamine/Chondroitin  Fish oil (omega-3)  Deleted:   Debrox ear drops  Co-Q10  Slo-Niacin  D3  Zinc  Changed: None    Allergies reviewed with patient and updates made in EHR: yes    Medication History Completed By: Behzad Bautista 4/29/2025 5:11 PM    PTA Med List   Medication Sig Last Dose/Taking    amitriptyline (ELAVIL) 25 MG tablet Take 1 tablet (25 mg) by mouth at bedtime. 4/28/2025 Bedtime    fish oil-omega-3 fatty acids 1000 MG capsule Take 1 g by mouth every morning. 4/29/2025 Morning    glucosamine-chondroitin 500-400 MG CAPS per capsule Take 1 capsule by mouth every morning. 4/29/2025 Morning    multivitamin w/minerals (THERA-VIT-M) tablet Take 1 tablet by mouth every morning. 4/29/2025 Morning    omeprazole (PRILOSEC) 20 MG DR capsule Take 20 mg by mouth every morning. 4/29/2025 Morning    Turmeric 400 MG CAPS Take 1 capsule by mouth every morning. 4/29/2025 Morning

## 2025-04-29 NOTE — ED NOTES
Bed: JNEDH-L  Expected date:   Expected time:   Means of arrival: Ambulance  Comments:  [facility]: 89m, fall, hip pain, no LOC, no thinners

## 2025-04-29 NOTE — ED NOTES
Progress Note    The patient was signed out to me by Dr. Johansen.    Brief HPI: The patient was playing pickleball today when he fell and landed on his right hip, with pain to his right anterior hip since. He had no loss of consciousness and endured no head injury.    ED Course as of 04/29/25 1707 Tue Apr 29, 2025   1457 89 yo M with fall, R hip pain, concern for fracture. Imaging pending.   1629 I spoke with Dr. Oswald with [facility].  He recommends femur imaging, n.p.o., and he will look at the images to see if any additional imaging is needed.  [facility] n.p.o., but no promises at this time.  Otherwise OR tomorrow. Pt updated.   5:08 PM Pt admitted to the hospitalist.    Final diagnoses:   Hip fracture, right, closed, initial encounter (H)         Chilango Longoria MD  Emergency Medicine  [facility]       Chilango Longoria MD  04/29/25 1708

## 2025-04-29 NOTE — CONSULTS
ORTHOPEDIC CONSULTATION    Consultation  Carlos Faith,  1935, MRN [id number]    [place]  Hip fracture, right, closed, initial encounter (H) [S72.001A]    PCP: Antoinette Bagley, [phone]   Code status:  No Order       Extended Emergency Contact Information  Primary Emergency Contact: Elma Faith  Address: [address]            [place] [address] [facility]  Mobile Phone: [phone]  Relation: Spouse  Secondary Emergency Contact: Van Murphy  Address: [address] [place] [address] [facility]  Mobile Phone: [phone]  Relation: Son         IMPRESSION:  89-year-old male with acute right closed displaced intertrochanteric fracture      PLAN:  This patient was discussed with Dr. Oswald, on-call surgeon for [facility] and they are in agreement with the following plan.    - Radiographs demonstrate right intertrochanteric femur fracture. Discussed treatment options including surgical intervention with intramedullary nail. Discussed risks of procedure including but not limited to, injury to nerves, arteries or veins, malunion, nonunion, periprosthetic fracture, DVT or even death with the patient and they are in agreement with proceeding.  - Will take to the OR tonight  - Continue NPO.   - Medical optimization per hospitalist  - Pain control as needed  - NWB RLE    Thank you for including [facility] in the care of Carlos Faith. It has been a pleasure participating in Carlos' care.      CHIEF COMPLAINT: <principal problem not specified>    HISTORY OF PRESENT ILLNESS:  The patient is seen in orthopedic consultation at the request of Dr. Longoria.  The patient is a 89 year old male with moderate pain of the right  hip. The patient reports that he was playing pickleball today when he ran for the ball and fell onto the right hip. Presented to ED. Last meal was at 7 this morning. NO prior hip pain. Hx of gastric  ulcer managed on omeprazole otherwise healthy. Unable to ambulate secondary to pain. No blood thinners.    PAST MEDICAL HISTORY:  Active Ambulatory Problems     Diagnosis Date Noted    H/O prostate cancer 01/15/2016    History of penile implant 01/15/2016    Mixed hyperlipidemia 01/15/2016    Gastroesophageal reflux disease without esophagitis 05/10/2016    Moderate single current episode of major depressive disorder (H) 01/04/2018    KANDI (generalized anxiety disorder) 01/24/2018    Stage 3a chronic kidney disease (H) 01/06/2025     Resolved Ambulatory Problems     Diagnosis Date Noted    Adjustment disorder with anxious mood 01/15/2016    Chronic pain of right knee 07/10/2020    Chronic kidney disease, stage 3 (H) 01/22/2021     Past Medical History:   Diagnosis Date    Anxiety     Duodenal ulcer     GERD without esophagitis     Hyperlipidemia     Prostate cancer (H) 1994            ALLERGIES:   Review of patient's allergies indicates   Allergies   Allergen Reactions    Sulfa Antibiotics Rash         MEDICATIONS UPON ADMISSION:  Medications were reviewed.  They include:   (Not in a hospital admission)        SOCIAL HISTORY:   he  reports that he has never smoked. He has never used smokeless tobacco. He reports current alcohol use. He reports that he does not use drugs.    FAMILY HISTORY:  family history includes Coronary Artery Disease in his father; Prostate Cancer in his paternal grandfather.      REVIEW OF SYSTEMS:   Reviewed with patient. See HPI, otherwise negative      PHYSICAL EXAMINATION:  Vitals: Temp:  [98.1  F (36.7  C)] 98.1  F (36.7  C)  Pulse:  [42-98] 98  Resp:  [16] 16  BP: ()/(52-85) 142/83  SpO2:  [93 %-100 %] 97 %  General: On examination, the patient is resting comfortably, NAD and awake, lying supine in bed and oriented to person, place and time   SKIN: No significant ecchymosis at hip, skin intact  Pulses:  Palpable bilateral dorsalis pedis pulses.  Sensation: intact and equal  bilaterally to the distal lower extremities, feet and toes.  Tenderness: Right hip tenderness noted. No tenderness noted in calfs bilaterally  ROM: Unable to lift left leg off bed due to pain  Motor: Equal ankle plantar and dorsiflexion, moves all toes bilaterally   Extremity: Right lower extremity externally rotated & shortened, thigh & calf compartments soft      RADIOGRAPHIC EVALUATION:  Personally reviewed    EXAM: XR FEMUR RIGHT 2 VIEWS  LOCATION: [facility]  DATE: 4/29/2025     INDICATION: R hip fx  COMPARISON: 04/29/2025                                                                      IMPRESSION: Acute, mildly displaced and foreshortened intertrochanteric/subtrochanteric fracture of the right proximal femur. No additional fracture. Demineralization. Arterial calcifications.    PERTINENT LABS:  Lab Results: personally reviewed.   Lab Results   Component Value Date    WBC 10.4 04/29/2025    WBC 5.0 11/17/2020    HGB 13.2 04/29/2025    HGB 15.9 11/17/2020    HCT 39.2 04/29/2025    HCT 46.9 11/17/2020    MCV 93 04/29/2025    MCV 92 11/17/2020     04/29/2025     11/17/2020         LATHA HARRIS PA-C  Date: 4/29/2025  Time: 6:06 PM    CC1:   Danyelle Cummins MD    CC2:   Antoinette Bagley

## 2025-04-29 NOTE — ED TRIAGE NOTES
Pt arrives via EMS aftr a fall while playing pickelball. Landed on R hip. 10/10 pain. Had 50mcg fentanyl in EMS. Denies other pertinent medical hx

## 2025-04-30 ENCOUNTER — APPOINTMENT (OUTPATIENT)
Dept: OCCUPATIONAL THERAPY | Facility: HOSPITAL | Age: OVER 89
DRG: 481 | End: 2025-04-30
Payer: MEDICARE

## 2025-04-30 ENCOUNTER — APPOINTMENT (OUTPATIENT)
Dept: PHYSICAL THERAPY | Facility: HOSPITAL | Age: OVER 89
DRG: 481 | End: 2025-04-30
Payer: MEDICARE

## 2025-04-30 LAB
ANION GAP SERPL CALCULATED.3IONS-SCNC: 10 MMOL/L (ref 7–15)
BUN SERPL-MCNC: 23.2 MG/DL (ref 8–23)
CALCIUM SERPL-MCNC: 9.1 MG/DL (ref 8.8–10.4)
CHLORIDE SERPL-SCNC: 103 MMOL/L (ref 98–107)
CREAT SERPL-MCNC: 0.96 MG/DL (ref 0.67–1.17)
EGFRCR SERPLBLD CKD-EPI 2021: 75 ML/MIN/1.73M2
ERYTHROCYTE [DISTWIDTH] IN BLOOD BY AUTOMATED COUNT: 13.9 % (ref 10–15)
GLUCOSE BLDC GLUCOMTR-MCNC: 115 MG/DL (ref 70–99)
GLUCOSE SERPL-MCNC: 163 MG/DL (ref 70–99)
HCO3 SERPL-SCNC: 25 MMOL/L (ref 22–29)
HCT VFR BLD AUTO: 34.3 % (ref 40–53)
HGB BLD-MCNC: 11.4 G/DL (ref 13.3–17.7)
MCH RBC QN AUTO: 30.9 PG (ref 26.5–33)
MCHC RBC AUTO-ENTMCNC: 33.2 G/DL (ref 31.5–36.5)
MCV RBC AUTO: 93 FL (ref 78–100)
PLATELET # BLD AUTO: 174 10E3/UL (ref 150–450)
POTASSIUM SERPL-SCNC: 4.5 MMOL/L (ref 3.4–5.3)
RBC # BLD AUTO: 3.69 10E6/UL (ref 4.4–5.9)
SODIUM SERPL-SCNC: 138 MMOL/L (ref 135–145)
WBC # BLD AUTO: 7.7 10E3/UL (ref 4–11)

## 2025-04-30 PROCEDURE — 99233 SBSQ HOSP IP/OBS HIGH 50: CPT | Performed by: INTERNAL MEDICINE

## 2025-04-30 PROCEDURE — 250N000011 HC RX IP 250 OP 636: Performed by: STUDENT IN AN ORGANIZED HEALTH CARE EDUCATION/TRAINING PROGRAM

## 2025-04-30 PROCEDURE — 85018 HEMOGLOBIN: CPT

## 2025-04-30 PROCEDURE — 97110 THERAPEUTIC EXERCISES: CPT | Mod: GP

## 2025-04-30 PROCEDURE — 97530 THERAPEUTIC ACTIVITIES: CPT | Mod: GO

## 2025-04-30 PROCEDURE — 97161 PT EVAL LOW COMPLEX 20 MIN: CPT | Mod: GP

## 2025-04-30 PROCEDURE — 97530 THERAPEUTIC ACTIVITIES: CPT | Mod: GP

## 2025-04-30 PROCEDURE — 36415 COLL VENOUS BLD VENIPUNCTURE: CPT

## 2025-04-30 PROCEDURE — 97535 SELF CARE MNGMENT TRAINING: CPT | Mod: GO

## 2025-04-30 PROCEDURE — 250N000013 HC RX MED GY IP 250 OP 250 PS 637: Performed by: STUDENT IN AN ORGANIZED HEALTH CARE EDUCATION/TRAINING PROGRAM

## 2025-04-30 PROCEDURE — 250N000013 HC RX MED GY IP 250 OP 250 PS 637

## 2025-04-30 PROCEDURE — 120N000001 HC R&B MED SURG/OB

## 2025-04-30 PROCEDURE — 80048 BASIC METABOLIC PNL TOTAL CA: CPT

## 2025-04-30 PROCEDURE — 97166 OT EVAL MOD COMPLEX 45 MIN: CPT | Mod: GO

## 2025-04-30 PROCEDURE — 250N000013 HC RX MED GY IP 250 OP 250 PS 637: Performed by: HOSPITALIST

## 2025-04-30 RX ORDER — NALOXONE HYDROCHLORIDE 0.4 MG/ML
0.4 INJECTION, SOLUTION INTRAMUSCULAR; INTRAVENOUS; SUBCUTANEOUS
Status: DISCONTINUED | OUTPATIENT
Start: 2025-04-30 | End: 2025-05-03 | Stop reason: HOSPADM

## 2025-04-30 RX ORDER — NALOXONE HYDROCHLORIDE 0.4 MG/ML
0.2 INJECTION, SOLUTION INTRAMUSCULAR; INTRAVENOUS; SUBCUTANEOUS
Status: DISCONTINUED | OUTPATIENT
Start: 2025-04-30 | End: 2025-05-03 | Stop reason: HOSPADM

## 2025-04-30 RX ORDER — HYDRALAZINE HYDROCHLORIDE 20 MG/ML
10 INJECTION INTRAMUSCULAR; INTRAVENOUS EVERY 4 HOURS PRN
Status: DISCONTINUED | OUTPATIENT
Start: 2025-04-30 | End: 2025-05-03 | Stop reason: HOSPADM

## 2025-04-30 RX ADMIN — ACETAMINOPHEN 975 MG: 325 TABLET ORAL at 00:39

## 2025-04-30 RX ADMIN — PANTOPRAZOLE SODIUM 40 MG: 40 TABLET, DELAYED RELEASE ORAL at 06:57

## 2025-04-30 RX ADMIN — ASPIRIN 81 MG: 81 TABLET, COATED ORAL at 08:19

## 2025-04-30 RX ADMIN — ASPIRIN 81 MG: 81 TABLET, COATED ORAL at 21:13

## 2025-04-30 RX ADMIN — SENNOSIDES AND DOCUSATE SODIUM 1 TABLET: 50; 8.6 TABLET ORAL at 00:39

## 2025-04-30 RX ADMIN — AMITRIPTYLINE HYDROCHLORIDE 25 MG: 25 TABLET, FILM COATED ORAL at 21:14

## 2025-04-30 RX ADMIN — OXYCODONE HYDROCHLORIDE 2.5 MG: 5 TABLET ORAL at 04:04

## 2025-04-30 RX ADMIN — CEFAZOLIN 1 G: 1 INJECTION, POWDER, FOR SOLUTION INTRAMUSCULAR; INTRAVENOUS at 11:34

## 2025-04-30 RX ADMIN — SENNOSIDES AND DOCUSATE SODIUM 1 TABLET: 50; 8.6 TABLET ORAL at 21:13

## 2025-04-30 RX ADMIN — ACETAMINOPHEN 975 MG: 325 TABLET ORAL at 08:19

## 2025-04-30 RX ADMIN — ACETAMINOPHEN 975 MG: 325 TABLET ORAL at 15:48

## 2025-04-30 RX ADMIN — CEFAZOLIN 1 G: 1 INJECTION, POWDER, FOR SOLUTION INTRAMUSCULAR; INTRAVENOUS at 04:04

## 2025-04-30 RX ADMIN — ASPIRIN 81 MG: 81 TABLET, COATED ORAL at 00:40

## 2025-04-30 ASSESSMENT — ACTIVITIES OF DAILY LIVING (ADL)
ADLS_ACUITY_SCORE: 24
ADLS_ACUITY_SCORE: 24
ADLS_ACUITY_SCORE: 27
ADLS_ACUITY_SCORE: 31
ADLS_ACUITY_SCORE: 24
ADLS_ACUITY_SCORE: 27
ADLS_ACUITY_SCORE: 24
ADLS_ACUITY_SCORE: 31
ADLS_ACUITY_SCORE: 27
DEPENDENT_IADLS:: INDEPENDENT
ADLS_ACUITY_SCORE: 23
ADLS_ACUITY_SCORE: 24
ADLS_ACUITY_SCORE: 24
ADLS_ACUITY_SCORE: 31
ADLS_ACUITY_SCORE: 24
ADLS_ACUITY_SCORE: 24
ADLS_ACUITY_SCORE: 31
ADLS_ACUITY_SCORE: 27
ADLS_ACUITY_SCORE: 31
ADLS_ACUITY_SCORE: 27

## 2025-04-30 NOTE — ANESTHESIA POSTPROCEDURE EVALUATION
Patient: Carlos Faith    Procedure: Procedure(s):  INTERNAL FIXATION, FRACTURE, TROCHANTERIC, HIP, USING PINS OR RODS       Anesthesia Type:  General    Note:  Disposition: Inpatient   Postop Pain Control: Uneventful            Sign Out: Well controlled pain   PONV: No   Neuro/Psych: Uneventful            Sign Out: Acceptable/Baseline neuro status   Airway/Respiratory: Uneventful            Sign Out: Acceptable/Baseline resp. status   CV/Hemodynamics: Uneventful            Sign Out: Acceptable CV status; No obvious hypovolemia; No obvious fluid overload   Other NRE: NONE   DID A NON-ROUTINE EVENT OCCUR? No           Last vitals:  Vitals Value Taken Time   /94 04/29/25 2330   Temp 34.6  C (94.28  F) 04/29/25 2334   Pulse 94 04/29/25 2335   Resp 39 04/29/25 2332   SpO2 91 % 04/29/25 2335   Vitals shown include unfiled device data.    Electronically Signed By: Gui Jauregui MD  April 29, 2025  11:46 PM

## 2025-04-30 NOTE — ANESTHESIA PROCEDURE NOTES
Airway       Patient location during procedure: OR  Staff -        CRNA: Siva Vidal APRN CRNA       Performed By: CRNA  Consent for Airway        Urgency: elective  Indications and Patient Condition       Indications for airway management: marian-procedural       Induction type:intravenous       Mask difficulty assessment: 0 - not attempted    Final Airway Details       Final airway type: supraglottic airway    Supraglottic Airway Details        Type: LMA       Brand: I-Gel       LMA size: 5    Post intubation assessment        Placement verified by: capnometry and equal breath sounds        Number of attempts at approach: 1       Number of other approaches attempted: 0       Secured with: tape       Ease of procedure: easy       Dentition: Intact

## 2025-04-30 NOTE — PROGRESS NOTES
04/30/25 0900   Appointment Info   Signing Clinician's Name / Credentials (PT) Laurita Johnson, PT, DPT   Living Environment   People in Home spouse;child(mere), adult   Current Living Arrangements apartment   Home Accessibility no concerns  (Elevator access)   Transportation Anticipated family or friend will provide   Living Environment Comments Adult son lives in apartment to assist with cares for spouse with CREST syndrome.   Self-Care   Usual Activity Tolerance excellent   Current Activity Tolerance moderate   Regular Exercise Yes   Activity/Exercise Type other (see comments)  (Pickleball)   Exercise Amount/Frequency daily   Equipment Currently Used at Home none   Fall history within last six months yes   Number of times patient has fallen within last six months 1   Activity/Exercise/Self-Care Comment Patient is active and independent at baseline.   General Information   Onset of Illness/Injury or Date of Surgery 04/29/25   Referring Physician Justina Salinas PA-C   Patient/Family Therapy Goals Statement (PT) Return home   Pertinent History of Current Problem (include personal factors and/or comorbidities that impact the POC) s/p Right proximal femur closed reduction internal fixation with intermediate length cephalomedullary nail   Existing Precautions/Restrictions fall   Weight-Bearing Status - LLE full weight-bearing   Weight-Bearing Status - RLE weight-bearing as tolerated   Range of Motion (ROM)   Range of Motion ROM deficits secondary to surgical procedure   Strength (Manual Muscle Testing)   Strength (Manual Muscle Testing) Deficits observed during functional mobility   Bed Mobility   Bed Mobility supine-sit   Supine-Sit Foard (Bed Mobility) set up;supervision   Bed Mobility Limitations decreased ability to use legs for bridging/pushing   Impairments Contributing to Impaired Bed Mobility pain;decreased ROM;decreased strength   Assistive Device (Bed Mobility) bed rails   Transfers   Transfers  sit-stand transfer   Maintains Weight-bearing Status (Transfers) able to maintain   Impairments Contributing to Impaired Transfers impaired balance;pain;decreased ROM;decreased strength   Sit-Stand Transfer   Sit-Stand [place]ountain (Transfers) minimum assist (75% patient effort);1 person assist;verbal cues   Assistive Device (Sit-Stand Transfers) walker, front-wheeled   Gait/Stairs (Locomotion)   Fountain Level (Gait) minimum assist (75% patient effort);1 person assist;verbal cues   Assistive Device (Gait) walker, front-wheeled   Distance in Feet (Gait) 1'   Pattern (Gait) step-to   Clinical Impression   Criteria for Skilled Therapeutic Intervention Yes, treatment indicated   PT Diagnosis (PT) Impaired functional mobility   Influenced by the following impairments Decreased ROM/strength, impaired balance, pain   Functional limitations due to impairments Bed mobility, transfers, gait   Clinical Presentation (PT Evaluation Complexity) stable   Clinical Presentation Rationale Patient presents as medically diagnosed.   Clinical Decision Making (Complexity) low complexity   Planned Therapy Interventions (PT) balance training;bed mobility training;gait training;home exercise program;patient/family education;ROM (range of motion);strengthening;transfer training;home program guidelines   Risk & Benefits of therapy have been explained evaluation/treatment results reviewed;care plan/treatment goals reviewed;patient   PT Total Evaluation Time   PT Eval, Low Complexity Minutes (79379) 15   Physical Therapy Goals   PT Frequency Daily   PT Predicted Duration/Target Date for Goal Attainment 05/07/25   PT Goals Bed Mobility;Transfers;Gait   PT: Bed Mobility Independent;Supine to/from sit   PT: Transfers Modified independent;Sit to/from stand;Assistive device  (FWW)   PT: Gait Modified independent;Assistive device;150 feet  (FWW)   Interventions   Interventions Quick Adds Therapeutic Activity;Therapeutic Procedure   Therapeutic  Procedure/Exercise   Ther. Procedure: strength, endurance, ROM, flexibillity Minutes (39101) 8   Symptoms Noted During/After Treatment none   Treatment Detail/Skilled Intervention Patient completed seated BLE exercises x 10 reps each with demonstration and verbal cues for exercise technique.   Therapeutic Activity   Therapeutic Activities: dynamic activities to improve functional performance Minutes (05811) 12   Symptoms Noted During/After Treatment Fatigue;Increased pain   Treatment Detail/Skilled Intervention Facilitated multiple sit<>stand reps from EOB with Min A to FWW. Max A required to don brief in standing due to impaired balance. Tactile and verbal cues provided for glute and quad engagement to facilitate hip and knee extension in standing. Patient completed pivot transfer to recliner with Min A and FWW.   PT Discharge Planning   PT Plan Transfers, gait with FWW, post-op hip exercises   PT Discharge Recommendation (DC Rec) Transitional Care Facility   PT Rationale for DC Rec Patient's mobility is currently limited by pain and weakness, requiring assist of 1 for transfers and unable to ambulate safely. Pending progress, patient's goal is to discharge home.   PT Brief overview of current status Min A for transfers with FWW.   PT Total Distance Amb During Session (feet) 2   PT Equipment Needed at Discharge walker, rolling  (Patient will need a FWW issued at discharge.)   Physical Therapy Time and Intention   Timed Code Treatment Minutes 20   Total Session Time (sum of timed and untimed services) 35

## 2025-04-30 NOTE — PLAN OF CARE
Problem: Adult Inpatient Plan of Care  Goal: Plan of Care Review  Description: The Plan of Care Review/Shift note should be completed every shift.  The Outcome Evaluation is a brief statement about your assessment that the patient is improving, declining, or no change.  This information will be displayed automatically on your shiftnote.  Outcome: Progressing     Problem: Hip Fracture Surgical Repair  Goal: Optimal Functional Ability  Outcome: Progressing  Intervention: Promote Optimal Functional Status  Recent Flowsheet Documentation  Taken 4/30/2025 1300 by Madeleine Bustamante, RN  Activity Management:   ambulated to bathroom   up in chair  Taken 4/30/2025 0907 by Madeleine Bustamante, RN  Activity Management: up in chair  Taken 4/30/2025 0800 by Madeleine Bustamante, RN  Activity Management:   activity adjusted per tolerance   activity encouraged     Problem: Hip Fracture Surgical Repair  Goal: Absence of Bleeding  Outcome: Progressing     Problem: Hip Fracture Surgical Repair  Goal: Effective Urinary Elimination  Outcome: Progressing   Goal Outcome Evaluation:       R hip surgical dressing intact with no drainage. PNV intact. Up to chair Ax1. Sched meds & ice applied for hip pain. Eating well. Passing gas, no BM yet.

## 2025-04-30 NOTE — ANESTHESIA CARE TRANSFER NOTE
Patient: Carlos Faith    Procedure: Procedure(s):  INTERNAL FIXATION, FRACTURE, TROCHANTERIC, HIP, USING PINS OR RODS       Diagnosis: Hip fracture, right, closed, initial encounter (H) [S72.001A]  Diagnosis Additional Information: No value filed.    Anesthesia Type:   General     Note:    Oropharynx: oropharynx clear of all foreign objects  Level of Consciousness: awake  Oxygen Supplementation: face mask  Level of Supplemental Oxygen (L/min / FiO2): 6  Independent Airway: airway patency satisfactory and stable  Dentition: dentition unchanged  Vital Signs Stable: post-procedure vital signs reviewed and stable  Report to RN Given: handoff report given  Patient transferred to: PACU    Handoff Report: Identifed the Patient, Identified the Reponsible Provider, Reviewed the pertinent medical history, Discussed the surgical course, Reviewed Intra-OP anesthesia mangement and issues during anesthesia, Set expectations for post-procedure period and Allowed opportunity for questions and acknowledgement of understanding    Vitals:  Vitals Value Taken Time   /96 04/29/25 2253   Temp 36.3  C (97.34  F) 04/29/25 2253   Pulse 90 04/29/25 2253   Resp 13 04/29/25 2253   SpO2 96 % 04/29/25 2253   Vitals shown include unfiled device data.    Electronically Signed By: FLORENTINO Dias Jr CRNA  April 29, 2025  10:55 PM

## 2025-04-30 NOTE — PROGRESS NOTES
[facility]    Medicine Progress Note - Hospitalist Service    Date of Admission:  4/29/2025    Assessment & Plan   89 year old male with history of CKD3, HLD, HTN, GERD, and mood disorder admitted on 4/29/2025 with right hip fracture.          Right hip fracture  Mechanical fall while playing pickle ball   S/P surgical repair 4/29/25  -- post op cares per ortho  -- plan discharge to TCU        CKD3a:  -- GFR baseline     Hypertension   Well-controlled with diet / regular exercise    -- prn hydralazine  -- Pain control    -- Continue to monitor        Hyperlipidemia -- Not currently taking medication       Mood disorder -- Continue PTA amitriptyline      GERD: continue home PPI                Diet: Advance Diet as Tolerated: Regular Diet Adult  Diet    DVT Prophylaxis: ASA 81mg BID  Peterson Catheter: Not present  Lines: None     Cardiac Monitoring: None  Code Status: Full Code      Clinically Significant Risk Factors Present on Admission          # Hyperchloremia: Highest Cl = 109 mmol/L in last 2 days, will monitor as appropriate                            # Financial/Environmental Concerns: none         Disposition Plan   Medically Ready for Discharge: Anticipated Tomorrow      Buzz Arroyo,   Hospitalist Service  [facility]  Securely message with [employer] Image Enh[employer] (more info)  Text page via [employer] Paging/Directory   ______________________________________________________________________    Interval History   NAD. Denies any current complaints    Physical Exam   Vital Signs: Temp: 98.2  F (36.8  C) Temp src: Oral BP: (!) 145/73 Pulse: 85   Resp: 18 SpO2: 94 % O2 Device: None (Room air) Oxygen Delivery: 6 LPM  Weight: 142 lbs 0 oz  General: NAD  RESPIRATORY: Breathing nonlabored  CARDIOVASCULAR: No le edema bilat.   NEUROLOGIC: Motor intact, speech clear         >50 MINUTES SPENT BY ME on the date of service doing chart review, history, exam, documentation & further  activities per the note.  Data

## 2025-04-30 NOTE — PLAN OF CARE
"  Problem: Adult Inpatient Plan of Care  Goal: Plan of Care Review  Description: The Plan of Care Review/Shift note should be completed every shift.  The Outcome Evaluation is a brief statement about your assessment that the patient is improving, declining, or no change.  This information will be displayed automatically on your shiftnote.  Outcome: Progressing  Goal: Patient-Specific Goal (Individualized)  Description: You can add care plan individualizations to a care plan. Examples of Individualization might be:  \"Parent requests to be called daily at 9am for status\", \"I have a hard time hearing out of my right ear\", or \"Do not touch me to wake me up as it startlesme\".  Outcome: Progressing  Goal: Absence of Hospital-Acquired Illness or Injury  Outcome: Progressing  Intervention: Identify and Manage Fall Risk  Recent Flowsheet Documentation  Taken 4/30/2025 0404 by Veronica Parker RN  Safety Promotion/Fall Prevention:   lighting adjusted   increase visualization of patient   supervised activity   toileting scheduled  Taken 4/30/2025 0006 by Veronica Parker RN  Safety Promotion/Fall Prevention:   lighting adjusted   increase visualization of patient   supervised activity   toileting scheduled  Intervention: Prevent Skin Injury  Recent Flowsheet Documentation  Taken 4/30/2025 0404 by Veronica Parker RN  Body Position:   turned   left  Skin Protection: adhesive use limited  Taken 4/30/2025 0006 by Veronica Parker RN  Body Position: supine  Skin Protection: adhesive use limited  Intervention: Prevent Infection  Recent Flowsheet Documentation  Taken 4/30/2025 0404 by Veronica Parker, RN  Infection Prevention:   cohorting utilized   rest/sleep promoted   single patient room provided  Taken 4/30/2025 0006 by Veronica Parker RN  Infection Prevention:   cohorting utilized   rest/sleep promoted   single patient room provided  Goal: Optimal Comfort and Wellbeing  Outcome: Progressing  Intervention: Monitor " Pain and Promote Comfort  Recent Flowsheet Documentation  Taken 4/30/2025 0404 by Veronica Parker, RN  Pain Management Interventions:   medication (see MAR)   cold applied   emotional support   repositioned  Taken 4/30/2025 0039 by Veronica Parker, RN  Pain Management Interventions:   medication (see MAR)   emotional support  Taken 4/30/2025 0006 by Veronica Parker, RN  Pain Management Interventions: cold applied  Goal: Readiness for Transition of Care  Outcome: Progressing   Goal Outcome Evaluation:             Pt a/o with VSS on room air. Pt pain controlled by scheduled tylenol and prn oxycodone. Pt has not been out of bed since surgery.

## 2025-04-30 NOTE — CONSULTS
Care Management Initial Consult    General Information  Assessment completed with: Patient,    Type of CM/SW Visit: Initial Assessment    Primary Care Provider verified and updated as needed: Yes   Readmission within the last 30 days: no previous admission in last 30 days      Reason for Consult: discharge planning  Advance Care Planning: Advance Care Planning Reviewed: no concerns identified          Communication Assessment  Patient's communication style: spoken language (English or Bilingual)    Hearing Difficulty or Deaf: yes   Wear Glasses or Blind: no    Cognitive  Cognitive/Neuro/Behavioral: WDL                      Living Environment:   People in home: spouse, child(mere), adult  Van Wills  Current living Arrangements: apartment      Able to return to prior arrangements: yes       Family/Social Support:  Care provided by: self  Provides care for: spouse  Marital Status:   Support system: Wife, Children  [place]       Description of Support System: Supportive, Involved         Current Resources:   Patient receiving home care services: No        Community Resources: None  Equipment currently used at home: none  Supplies currently used at home: None    Employment/Financial:  Employment Status: retired        Financial Concerns: none           Does the patient's insurance plan have a 3 day qualifying hospital stay waiver?  Yes     Which insurance plan 3 day waiver is available? [employer]    Will the waiver be used for post-acute placement? Undetermined at this time    Lifestyle & Psychosocial Needs:  Social Drivers of Health     Food Insecurity: Low Risk  (4/30/2025)    Food Insecurity     Within the past 12 months, did you worry that your food would run out before you got money to buy more?: No     Within the past 12 months, did the food you bought just not last and you didn t have money to get more?: No   Depression: Not at risk (1/6/2025)    PHQ-2     PHQ-2 Score: 0   Housing Stability: Low Risk   (4/30/2025)    Housing Stability     Do you have housing? : Yes     Are you worried about losing your housing?: No   Tobacco Use: Low Risk  (4/29/2025)    Patient History     Smoking Tobacco Use: Never     Smokeless Tobacco Use: Never     Passive Exposure: Not on file   Financial Resource Strain: Low Risk  (4/30/2025)    Financial Resource Strain     Within the past 12 months, have you or your family members you live with been unable to get utilities (heat, electricity) when it was really needed?: No   Alcohol Use: Not on file   Transportation Needs: Low Risk  (4/30/2025)    Transportation Needs     Within the past 12 months, has lack of transportation kept you from medical appointments, getting your medicines, non-medical meetings or appointments, work, or from getting things that you need?: No   Physical Activity: Sufficiently Active (1/6/2025)    Exercise Vital Sign     Days of Exercise per Week: 7 days     Minutes of Exercise per Session: 60 min   Interpersonal Safety: Low Risk  (4/29/2025)    Interpersonal Safety     Do you feel physically and emotionally safe where you currently live?: Yes     Within the past 12 months, have you been hit, slapped, kicked or otherwise physically hurt by someone?: No     Within the past 12 months, have you been humiliated or emotionally abused in other ways by your partner or ex-partner?: No   Stress: No Stress Concern Present (1/6/2025)    [place] [employer] - Occupational Stress Questionnaire     Feeling of Stress : Not at all   Social Connections: Unknown (1/6/2025)    Social Connection and Isolation Panel [NHANES]     Frequency of Communication with Friends and Family: Not on file     Frequency of Social Gatherings with Friends and Family: Three times a week     Attends [employer] Services: Not on file     Active Member of Clubs or Organizations: Not on file     Attends Club or Organization Meetings: Not on file     Marital Status: Not on file   Health  Literacy: Not on file       Functional Status:  Prior to admission patient needed assistance:   Dependent ADLs:: Independent  Dependent IADLs:: Independent       Mental Health Status:          Chemical Dependency Status:                Values/Beliefs:  Spiritual, Cultural Beliefs, Yarsanism Practices, Values that affect care:                 Discussed  Partnership in Safe Discharge Planning  document with patient/family: No    Additional Information:  Met with patient to review role of care management, progression of care and possible need for services at discharge, including OP services, home care, or skilled nursing care. Patient alert, oriented and engaged in the conversation. Writer then verified patient demographics and updated any changes needed in the patient chart. Pt lives in an apartment with his wife and adult son. He is independent with ADLs and IADls. Very active in the community. He had a fall playing pickle ball. Goal is to discharge home and family can transport at discharge.       Next Steps: follow for therapy recommendations    Melida Buchanan RN

## 2025-04-30 NOTE — ANESTHESIA PROCEDURE NOTES
Fascia iliaca Procedure Note    Pre-Procedure   Staff -        Anesthesiologist:  Gui Jauregui MD       Performed By: anesthesiologist       Location: OR       Procedure Start/Stop Times: 4/29/2025 8:46 PM and 4/29/2025 8:50 PM       Pre-Anesthestic Checklist: patient identified, IV checked, site marked, risks and benefits discussed, informed consent, monitors and equipment checked, pre-op evaluation, at physician/surgeon's request and post-op pain management  Timeout:       Correct Patient: Yes        Correct Procedure: Yes        Correct Site: Yes        Correct Position: Yes        Correct Laterality: Yes        Site Marked: Yes  Procedure Documentation  Procedure: Fascia iliaca         Laterality: right       Patient Position: supine       Patient Prep/Sterile Barriers: sterile gloves, mask       Skin prep: Chlorapreplower extremity plane block       Needle Type: insulated and short bevel       Needle Gauge: 20.        Needle Length (Inches): 4        Ultrasound guided       1. Ultrasound was used to identify targeted nerve, plexus, vascular marker, or fascial plane and place a needle adjacent to it in real-time.       2. Ultrasound was used to visualize the spread of anesthetic in close proximity to the above referenced structure.       3. A permanent image is entered into the patient's record.       4. The visualized anatomic structures appeared normal.       5. There were no apparent abnormal pathologic findings.    Assessment/Narrative         The placement was negative for: blood aspirated, painful injection and site bleeding       Paresthesias: No.       Bolus given via needle..        Secured via.        Insertion/Infusion Method: Single Shot       Complications: none       Injection made incrementally with aspirations every 5 mL.    Medication(s) Administered   Bupivacaine 0.5% PF (Infiltration) - Infiltration   30 mL - 4/29/2025 8:46:00 PM  Medication Administration Time: 4/29/2025 8:46  "PM      FOR [facility] ONLY:   Pain Team Contact information: please page the Pain Team Via [employer]. Search \"Pain\". During daytime hours, please page the attending first. At night please page the resident first.      "

## 2025-04-30 NOTE — PROGRESS NOTES
04/30/25 1315   Appointment Info   Signing Clinician's Name / Credentials (OT) Janelle Duvall DASHA OTR/L CLT   Living Environment   People in Home spouse;child(mere), adult   Current Living Arrangements apartment   Home Accessibility no concerns   Transportation Anticipated family or friend will provide   Self-Care   Equipment Currently Used at Home none   Activity/Exercise/Self-Care Comment I and active at baseline. Son A with caring for patients spouse   General Information   Onset of Illness/Injury or Date of Surgery 04/29/25   Referring Physician    Additional Occupational Profile Info/Pertinent History of Current Problem Carlos Faith is a 89 year old male admitted on 4/29/2025. PMHx of CKD3a, HLD, HTN, GERD, mood disorder. No history of heart or lung disease. He presents to ED via EMS after a mechanical fall playing pickle ball earlier today landing on his right hip.  XR right hip indicates right hip fracture.  Ortho following - plan to take patient to surgery later this evening.  NPO for procedure.  PT/OT consults in the morning.   Existing Precautions/Restrictions no known precautions/restrictions   Cognitive Status Examination   Affect/Mental Status (Cognitive) WNL   Follows Commands WNL   Transfers   Transfers sit-stand transfer   Sit-Stand Transfer   Sit-Stand [place] (Transfers) contact guard   Balance   Balance Assessment standing static balance   Standing Balance: Static contact guard   Activities of Daily Living   BADL Assessment/Intervention lower body dressing   Lower Body Dressing Assessment/Training   [place] Level (Lower Body Dressing) socks  (unable to doff  due to pain)   Clinical Impression   Criteria for Skilled Therapeutic Interventions Met (OT) Yes, treatment indicated   OT Diagnosis decreased ADL i'dence   OT Problem List-Impairments impacting ADL balance;mobility;strength;pain   Assessment of Occupational Performance 1-3 Performance Deficits   Identified  Performance Deficits drsg, standing, trsfs   Planned Therapy Interventions (OT) ADL retraining;transfer training;progressive activity/exercise   Clinical Decision Making Complexity (OT) detailed assessment/moderate complexity   Risk & Benefits of therapy have been explained evaluation/treatment results reviewed   OT Total Evaluation Time   OT Eval, Low Complexity Minutes (23666) 6   OT Goals   Therapy Frequency (OT) 5 times/week   OT Predicted Duration/Target Date for Goal Attainment 05/07/25   OT Goals Hygiene/Grooming;Lower Body Dressing;Transfers;Toilet Transfer/Toileting   OT: Hygiene/Grooming supervision/stand-by assist   OT: Lower Body Dressing Supervision/stand-by assist   OT: Transfer Supervision/stand-by assist   OT: Toilet Transfer/Toileting Supervision/stand-by assist   Interventions   Interventions Quick Adds Self-Care/Home Management;Therapeutic Activity   Self-Care/Home Management   Self-Care/Home Mgmt/ADL, Compensatory, Meal Prep Minutes (89938) 8   Treatment Detail/Skilled Intervention Chair trsf- wtih SBA and cues for good hand placement. Once seated started education on use of AE for drsg. Instructed how to doff socks with reacher. Patient able to complete with SBA after instruction. Education on how to use a sock aide. After instruction able to don socks with Sock Aide with SBA and moderate step by step cueing.   Therapeutic Activities   Therapeutic Activity Minutes (80945) 8   Treatment Detail/Skilled Intervention Ambulated in room and hallway ~ 75ft with fww and CGA for ADL endurance. Slow pace. Instructed on slow movements with turning and fatiguing easily.   OT Discharge Planning   OT Plan trsfs, toileting, g/h, Pants with AE   OT Discharge Recommendation (DC Rec) Transitional Care Facility   OT Rationale for DC Rec Patient nevinenlty A of 1 with trsfs and ADLs. Recommend a shorty stay in TCU to increase ADL I'dence   OT Brief overview of current status CGA   OT Total Distance Amb During  Session (feet) 75   Total Session Time   Timed Code Treatment Minutes 16   Total Session Time (sum of timed and untimed services) 22

## 2025-04-30 NOTE — INTERVAL H&P NOTE
I have reviewed the surgical (or preoperative) H&P that is linked to this encounter, and examined the patient. There are no significant changes    Clinical Conditions Present on Arrival:  Clinically Significant Risk Factors Present on Admission          # Hyperchloremia: Highest Cl = 109 mmol/L in last 2 days, will monitor as appropriate                   I was able to speak with Lamin and his family in the pre operative holding area regarding the nature of his right intertrochanteric femur fracture. I reviewed his radiographic and clinical findings. We discussed the pertinent anatomy surrounding the hip and proximal femur.  We discussed treatment options for his proximal femur fracture, both nonoperative as well as operative.  Risks and benefits of both were reviewed in detail.  From a conservative standpoint, the patient can pursue non-operative management, which would include protected weight bearing to the lower extremity, which carries a high risk of morbidity and mortality due to prolonged and diminished mobilization/ambulation and it's associated complications, which include but are not limited to blood clots (DVT/PE), skin ulcerations and pressure sores, and pneumonia. In the long term, there is also the risk of chronic pain, fracture nonunion, fracture malunion.  From a surgical standpoint, I discussed treatment in the form of closed versus open reduction right proximal femur with placement of a intermediate length cephalomedullary nail.  The rationale for surgical management was reviewed, including ability to expedite mobilization, weightbearing, and optimize fracture alignment/stability while healing takes place.  I reviewed the risks associated with surgical management including but not limited to complications of anesthesia, infection, wound issues, bleeding, damage to surrounding structures, DVT/PE, fracture nonunion, loss of fixation, malunion, persistent pain, weakness, dysfunction, and need for  potential future surgery.  Following this conversation, he and his family expressed an understanding and wished to proceed with surgery despite the risks involved.    Vernon Oswald MD   Orthopedic Surgery   [facility]

## 2025-04-30 NOTE — PROGRESS NOTES
"Orthopedic Progress Note      Assessment: 1 Day Post-Op  S/P Procedure(s):  INTERNAL FIXATION, FRACTURE, TROCHANTERIC, HIP, USING PINS OR RODS     Plan:   Activity: Up with assist and assistive device (walker) at all times.  Weight bearing status: WBAT RLE.  Pain management: Transition from IV to PO as tolerated. Judicious narcotic use given    Antibiotics: Ancef x 24 hours.  Diet: Begin with clear fluids and progress diet as tolerated.   DVT prophylaxis: ASA 81 mg BID x 4 weeks to begin on POD#1 and mechanical while in the hospital  Labs: Hgb POD#1 and 2, check as needed thereafter. 11.4 Today. Transfuse for Hbg <7.0   Dressings: Keep clean, dry and intact until follow up.   Elevation: Elevate RLE on pillows to keep above the level of the heart as much as possible.   Physical Therapy/Occupational Therapy: Eval and treat, appreciate assistance and recommendations.  Follow-up: Please follow-up with Dr. Oswald's team in 2 weeks for incision check, suture removal, and repeat x-rays needed at [facility]. Call our scheduling line at [phone] to make an appointment.  -- Recommend outpatient bone health evaluation including DEXA scan, with medical management as indicated. Tentative plan to coordinate follow with Dr. Castillo at [facility]. Vitamin D and Ca supplementation for the time being pending work up.   Disposition: Pending progress with therapies, pain control on orals, and medical stability, anticipate discharge to TCU.      Subjective:  Pain: mild  Nausea, Vomiting:  No  Lightheadedness, Dizziness:  No  Neuro:  Patient denies new onset numbness or paresthesias  Fever, chills: No  Chest pain: No  SOB: No    Patient reports feeling well today. Patient reports pain is tolerable with current pain regimen. He does state he occasionally gets a sharp \"electrical zing\" in his anterior hip. He states it is not necessarily painful, and does not radiate down his leg. He also states his right leg feels " "very weak, and he has a hard time lifting it up. I discussed with him that we will keep an eye on this and hope that it improves with time. Patient eating and drinking well. All questions/concerns answered.      Objective:  BP (!) 145/73 (BP Location: Right arm)   Pulse 85   Temp 98.2  F (36.8  C) (Oral)   Resp 18   Ht 1.753 m (5' 9\")   Wt 64.4 kg (142 lb)   SpO2 94%   BMI 20.97 kg/m      The patient is A&Ox3. Appears comfortable, sitting up in chair  Calves without tenderness, neg Lelo's  Brisk capillary refill in the toes.   Palpable Right dorsalis pedis pulse. Right foot warm & well-perfused.  Sensation is intact to light touch & equal bilaterally in the femoral, DP, SP & tibial nerve distributions.  ROM: Appropriately flexes & extends all toes bilaterally.   Motor: +5/5 dorsiflexion, plantar flexion & EHL bilaterally.   Leg lengths equal.  Dressing C/D/I without strikethrough, no surrounding erythema.      Pertinent Labs   Lab Results: personally reviewed.   Lab Results   Component Value Date    INR 1.03 04/29/2025    PROTIME 13.8 04/29/2025     Lab Results   Component Value Date    WBC 7.7 04/30/2025    HGB 11.4 (L) 04/30/2025    HCT 34.3 (L) 04/30/2025    MCV 93 04/30/2025     04/30/2025     Lab Results   Component Value Date     04/30/2025    CO2 25 04/30/2025         Report completed by:  Doreen Delgadillo PA-C  Date: 04/30/2025  [facility]             "

## 2025-04-30 NOTE — OP NOTE
Operative Note    Name:  Carlos Faith  PCP:  Antoinette Bagley  Procedure Date:  4/29/2025     Pre-Procedure Diagnosis:  Right comminuted, displaced intertrochanteric femur fracture    Post-Procedure Diagnosis:    Right comminuted, displaced intertrochanteric femur fracture    Procedure:  Right proximal femur closed reduction internal fixation with intermediate length cephalomedullary nail    Surgeon: Vernon Oswald MD     Assistant: Lizzie Ba PA-C    A PAC was necessary to ensure safety of this patient and adequate progression of the procedure.    Anesthesia Type: Choice with Block     Estimated Blood Loss: 200 mL    Implants:   1. Synthes 11 x 235 mm TFNA, 130 degree.  2. 110 mm lag screw.  3. 40 mm distal locking screw.    Complications: None apparent    Indications for procedure:   Carlos Faith is a pleasant 89 year old male who sustained a ground level fall earlier today while playing pickleball, losing his balance and falling onto his right side. He had immediate onset of pain and inability to bear weight. He presented to the emergency department where evaluation where XR demonstrated a displaced, comminuted right intertrochanteric femur fracture with lesser tuberosity fracture extending into the subtrochanteric region. The patient underwent medical optimization and was cleared for surgery by the hospitalist team. I was able to speak with Lamin and his family in the pre operative holding area regarding the nature of his right intertrochanteric femur fracture. I reviewed his radiographic and clinical findings. We discussed the pertinent anatomy surrounding the hip and proximal femur.  We discussed treatment options for his right proximal femur fracture, both nonoperative as well as operative.  Risks and benefits of both were reviewed in detail.  From a conservative standpoint, the patient can pursue non-operative management, which would include protected weight bearing to the  lower extremity, which carries a high risk of morbidity and mortality due to prolonged and diminished mobilization/ambulation and it's associated complications, which include but are not limited to blood clots (DVT/PE), skin ulcerations and pressure sores, and pneumonia. In the long term, there is also the risk of chronic pain, fracture nonunion, fracture malunion.  From a surgical standpoint, I discussed treatment in the form of closed versus open reduction right proximal femur with placement of a intermediate length cephalomedullary nail.  The rationale for surgical management was reviewed, including ability to expedite mobilization, weightbearing, and optimize fracture alignment/stability while healing takes place.  I reviewed the risks associated with surgical management including but not limited to complications of anesthesia, infection, wound issues, bleeding, damage to surrounding structures, DVT/PE, fracture nonunion, loss of fixation, malunion, persistent pain, weakness, dysfunction, and need for potential future surgery.  Following this conversation, he expressed an understanding and wished to proceed with surgery despite the risks involved.    Procedure:   The patient was identified in the preoperative holding area. After a thorough discussion of the risks and benefits of surgery, the patient wished to proceed with surgery and written informed consent was obtained. The correct operative site was marked with indelible ink.  The patient was then brought to the operating room and general anesthesia was successfully induced by the anesthesiologist on his hospital bed. The patient underwent a regional nerve block by the anesthesia team with good effect. Next, both feet were well padded with cast padding and placed into the traction boots. The patient was then transferred to the fracture table and a well-padded perineal post was placed between the legs. Pre-operative antibiotics and TXA were administered prior  to incision. A reduction maneuver consisting of traction, adduction and internal rotation of the left lower extremity was provided. Intraoperative x-ray were then utilized in both AP and lateral planes to confirm appropriate reduction. The entire femoral head was visible. Once satisfied with appropriate reduction achieved, the right hip and thigh were then prepped and draped in the usual sterile fashion. Prior to the procedure, a timeout was performed with all operating room staff to confirm patient, procedure to be performed, laterality, and all equipment needs for surgery. All operating staff were in agreement without safety concerns.     A longitudinal incision was then made centered proximal to the tip of the left greater trochanter. Sharp dissection was carried down through skin and subcutaneous tissue. Deep dissection was performed with electrocautery down to the level of fascia. Hemostasis was achieved with electrocautery. Fascia was then split in line over the tip of the greater trochanter. The tip of the trochanter was easily palpable. Next, a starting guidepin was then placed in the greater trochanter, and appropriate start point was identified on both AP and lateral planes. Once satisfied with starting point, the guide wire was advanced to level of the lesser trochanter, aiming to be be centered with the femoral canal on AP and lateral imaging. A coring opening reamer was then used to open the proximal canal to the level of the lesser trochanter under fluoroscopic guidance.  The final 11 mm x 235 mm 130 degree Synthes TFNA intramedullary nail was then opened and  assembled to the insertion jig on the back table. The nail was assessed to ensure guidewire appropriately passed centered through the nail's lag screw aperture via the triple sleeve cannula. The final nail was then carefully advanced into position under fluoroscopic guidance. AP and lateral views were obtained to ensure maintenance of fracture  reduction, and appropriate positioning of the nail.  A small stab incision was then made laterally through the skin and using the targeting guide for the lag screw, the barrel was advanced down to the bone. A threaded pin was then advanced, aiming to be centered on both the AP and lateral planes, up the femoral neck, into the head, with tip-apex distance minimized for optimal placement. Lag screw length was then measured, and a 10.5 mm x 110 mm lag screw was felt to be most appropriate. The lateral cortex was then opened, and guidewire was then over drilled set at a depth of 105. Care was taken to not penetrate the femoral head. Next, the final 110 mm lag screw was turned into position. The nail set screw was then advanced, and turned back a quarter turn to allow for compression. Compression was then applied. Set screw was then advanced again, and slightly back turned to allow for controlled dynamic compression. Fluoroscopic AP and lateral views confirmed appropriate screw position within the femoral head, with maintenance of fracture reduction.     Attention was then turned to the distal locking screw. Using the attached aiming arm, a small stab incision was made laterally through the skin. A drill hole was then made and found to be centered. The depth was then measured and a 40 mm distal interlocking screw was felt to be most appropriate. The final screw was then opened, and turned into position with good purchase in bone. Final AP and lateral images were obtained, demonstrating appropriate implant position and proper maintenance of fracture reduction.     The incisions were then copiously irrigated with normal sterile saline. Hemostasis was achieved with electrocautery, after which 1 gram of Vancomycin powder was sprinkled within the wounds. The deep fascia was closed with interrupted 0 Vicryl, followed by a reinforcing running 0 Vicryl suture. Deep adipose tissue was closed with running 0 Vicryl suture. Skin was  closed with buried 2-0 Vicryl for deep dermal layer, followed by a subcuticular running 3-0 Monocryl and Dermabond. Skin was then cleansed, dried, and sterile waterproof dressings were then applied. The patient was then transferred to his hospital bed, and awoken from anesthesia without complication. The patient was then taken from the operating room to the PACU in stable condition. There were no apparent complications. All sponge and needle counts were correct at the conclusion of the procedure.    Post-Operative Plan:     Activity: Up with assist and assistive device (walker) at all times.  Weight bearing status: WBAT RLE.  Pain management: Transition from IV to PO as tolerated. Judicious narcotic use given    Antibiotics: Ancef x 24 hours.  Diet: Begin with clear fluids and progress diet as tolerated.   DVT prophylaxis: ASA 81 mg BID x 4 weeks to begin on POD#1 and mechanical while in the hospital  Imaging: complete.  Labs: Hgb POD#1 and 2, check as needed thereafter. Transfuse for Hbg <7.0   Dressings: Keep clean, dry and intact until follow up.   Elevation: Elevate RLE on pillows to keep above the level of the heart as much as possible.   Physical Therapy/Occupational Therapy: Eval and treat, appreciate assistance and recommendations.  Consults: PT/OT, care coordination for disposition planning   --Recommend outpatient bone health evaluation including DEXA scan, with medical management as indicated. Tentative plan to coordinate follow with Dr. Castillo at [facility]. Vitamin D and Ca supplementation for the time being pending work up.   Follow-up: Clinic in 2 weeks for incision check, suture removal, and repeat x-rays needed.   Disposition: Pending progress with therapies, pain control on orals, and medical stability, anticipate discharge to TCU.      Vernon Oswald MD   Orthopedic Surgery   [facility]     Implant Name Type Inv. Item Serial No.  Lot No. LRB No. Used Action   IMP  NAIL SYN CAN FEM PROX TFNA [id number] 130D RT 04.037.144S - SN/A [employer] IMP NAIL SYN CAN FEM PROX TFNA [id number] 130D RT 04.037.144S N/A [id number] [id number] Right 1 Implanted   IMP SCR SYN TFNA FENESTRATED LAG 110MM 04.038.210S - SN/A [employer] IMP SCR SYN TFNA FENESTRATED LAG 110MM 04.038.210S N/A [id number] [id number] Right 1 Implanted   SCREW LCK LGHT GRN 40MM 5MM X25 FEM TI STRL 04.045.040TS - SN/A [employer]/[place] SCREW LCK LGHT GRN 40MM 5MM X25 FEM TI STRL 04.045.040TS N/A [employer] [id number] Right 1 Implanted

## 2025-05-01 ENCOUNTER — APPOINTMENT (OUTPATIENT)
Dept: PHYSICAL THERAPY | Facility: HOSPITAL | Age: OVER 89
DRG: 481 | End: 2025-05-01
Payer: MEDICARE

## 2025-05-01 ENCOUNTER — APPOINTMENT (OUTPATIENT)
Dept: OCCUPATIONAL THERAPY | Facility: HOSPITAL | Age: OVER 89
DRG: 481 | End: 2025-05-01
Payer: MEDICARE

## 2025-05-01 LAB
ERYTHROCYTE [DISTWIDTH] IN BLOOD BY AUTOMATED COUNT: 14.1 % (ref 10–15)
HCT VFR BLD AUTO: 30.5 % (ref 40–53)
HGB BLD-MCNC: 10.4 G/DL (ref 13.3–17.7)
HOLD SPECIMEN: NORMAL
MCH RBC QN AUTO: 31.6 PG (ref 26.5–33)
MCHC RBC AUTO-ENTMCNC: 34.1 G/DL (ref 31.5–36.5)
MCV RBC AUTO: 93 FL (ref 78–100)
PLATELET # BLD AUTO: 143 10E3/UL (ref 150–450)
RBC # BLD AUTO: 3.29 10E6/UL (ref 4.4–5.9)
WBC # BLD AUTO: 7.2 10E3/UL (ref 4–11)

## 2025-05-01 PROCEDURE — 250N000013 HC RX MED GY IP 250 OP 250 PS 637: Performed by: HOSPITALIST

## 2025-05-01 PROCEDURE — 99232 SBSQ HOSP IP/OBS MODERATE 35: CPT | Performed by: INTERNAL MEDICINE

## 2025-05-01 PROCEDURE — 120N000001 HC R&B MED SURG/OB

## 2025-05-01 PROCEDURE — 97110 THERAPEUTIC EXERCISES: CPT | Mod: GP

## 2025-05-01 PROCEDURE — 85018 HEMOGLOBIN: CPT | Performed by: INTERNAL MEDICINE

## 2025-05-01 PROCEDURE — 97535 SELF CARE MNGMENT TRAINING: CPT | Mod: GO

## 2025-05-01 PROCEDURE — 36415 COLL VENOUS BLD VENIPUNCTURE: CPT | Performed by: INTERNAL MEDICINE

## 2025-05-01 PROCEDURE — 250N000013 HC RX MED GY IP 250 OP 250 PS 637: Performed by: STUDENT IN AN ORGANIZED HEALTH CARE EDUCATION/TRAINING PROGRAM

## 2025-05-01 PROCEDURE — 250N000013 HC RX MED GY IP 250 OP 250 PS 637

## 2025-05-01 PROCEDURE — 97530 THERAPEUTIC ACTIVITIES: CPT | Mod: GO

## 2025-05-01 PROCEDURE — 97116 GAIT TRAINING THERAPY: CPT | Mod: GP

## 2025-05-01 RX ORDER — ASPIRIN 81 MG/1
81 TABLET, COATED ORAL 2 TIMES DAILY
Qty: 60 TABLET | Refills: 0 | Status: SHIPPED | OUTPATIENT
Start: 2025-05-01

## 2025-05-01 RX ORDER — OXYCODONE HYDROCHLORIDE 5 MG/1
2.5 TABLET ORAL EVERY 4 HOURS PRN
Qty: 10 TABLET | Refills: 0 | Status: SHIPPED | OUTPATIENT
Start: 2025-05-01

## 2025-05-01 RX ORDER — AMOXICILLIN 250 MG
2 CAPSULE ORAL 2 TIMES DAILY PRN
Qty: 30 TABLET | Refills: 0 | Status: SHIPPED | OUTPATIENT
Start: 2025-05-01

## 2025-05-01 RX ORDER — ACETAMINOPHEN 325 MG/1
975 TABLET ORAL EVERY 8 HOURS
Qty: 100 TABLET | Refills: 0 | Status: SHIPPED | OUTPATIENT
Start: 2025-05-01

## 2025-05-01 RX ADMIN — OXYCODONE HYDROCHLORIDE 2.5 MG: 5 TABLET ORAL at 05:36

## 2025-05-01 RX ADMIN — ACETAMINOPHEN 975 MG: 325 TABLET ORAL at 16:54

## 2025-05-01 RX ADMIN — SENNOSIDES AND DOCUSATE SODIUM 1 TABLET: 50; 8.6 TABLET ORAL at 21:32

## 2025-05-01 RX ADMIN — OXYCODONE HYDROCHLORIDE 2.5 MG: 5 TABLET ORAL at 21:32

## 2025-05-01 RX ADMIN — ACETAMINOPHEN 975 MG: 325 TABLET ORAL at 23:52

## 2025-05-01 RX ADMIN — PANTOPRAZOLE SODIUM 40 MG: 40 TABLET, DELAYED RELEASE ORAL at 05:36

## 2025-05-01 RX ADMIN — ASPIRIN 81 MG: 81 TABLET, COATED ORAL at 21:32

## 2025-05-01 RX ADMIN — AMITRIPTYLINE HYDROCHLORIDE 25 MG: 25 TABLET, FILM COATED ORAL at 21:32

## 2025-05-01 RX ADMIN — SENNOSIDES AND DOCUSATE SODIUM 1 TABLET: 50; 8.6 TABLET ORAL at 08:12

## 2025-05-01 RX ADMIN — ACETAMINOPHEN 975 MG: 325 TABLET ORAL at 08:12

## 2025-05-01 RX ADMIN — ACETAMINOPHEN 975 MG: 325 TABLET ORAL at 00:26

## 2025-05-01 RX ADMIN — OXYCODONE HYDROCHLORIDE 2.5 MG: 5 TABLET ORAL at 14:12

## 2025-05-01 RX ADMIN — ASPIRIN 81 MG: 81 TABLET, COATED ORAL at 08:13

## 2025-05-01 ASSESSMENT — ACTIVITIES OF DAILY LIVING (ADL)
ADLS_ACUITY_SCORE: 30
ADLS_ACUITY_SCORE: 31
ADLS_ACUITY_SCORE: 30
ADLS_ACUITY_SCORE: 31
ADLS_ACUITY_SCORE: 30
ADLS_ACUITY_SCORE: 31
ADLS_ACUITY_SCORE: 30
ADLS_ACUITY_SCORE: 31
ADLS_ACUITY_SCORE: 30
ADLS_ACUITY_SCORE: 31
ADLS_ACUITY_SCORE: 31
ADLS_ACUITY_SCORE: 30
ADLS_ACUITY_SCORE: 31
ADLS_ACUITY_SCORE: 30
ADLS_ACUITY_SCORE: 30
ADLS_ACUITY_SCORE: 31
ADLS_ACUITY_SCORE: 30

## 2025-05-01 NOTE — PLAN OF CARE
Patient is A&Ox4. He is able to make his needs known. Pain is controlled with scheduled Tylenol. Patient able to walk to bathroom with assist of 1 and walker. Patient ate 100% of dinner. Dressing is CDI.  Problem: Adult Inpatient Plan of Care  Goal: Absence of Hospital-Acquired Illness or Injury  Outcome: Progressing  Intervention: Prevent Skin Injury  Recent Flowsheet Documentation  Taken 4/30/2025 1620 by Yessica Sunshine RN  Body Position: (up in chair) other (see comments)  Skin Protection: adhesive use limited  Intervention: Prevent and Manage VTE (Venous Thromboembolism) Risk  Recent Flowsheet Documentation  Taken 4/30/2025 1620 by Yessica Sunshine RN  VTE Prevention/Management: SCDs on (sequential compression devices)  Intervention: Prevent Infection  Recent Flowsheet Documentation  Taken 4/30/2025 1620 by Yessica Sunshine RN  Infection Prevention:   hand hygiene promoted   rest/sleep promoted  Goal: Optimal Comfort and Wellbeing  Outcome: Progressing     Problem: Delirium  Goal: Optimal Coping  Outcome: Progressing  Goal: Improved Behavioral Control  Outcome: Progressing     Problem: Hip Fracture Surgical Repair  Goal: Optimal Coping with Change in Health Status  Outcome: Progressing  Goal: Absence of Bleeding  Outcome: Progressing  Goal: Cognitive Function Maintained  Outcome: Progressing  Goal: Optimal Pain Control and Function  Outcome: Progressing  Goal: Nausea and Vomiting Relief  Outcome: Progressing     Problem: Chronic Kidney Disease  Goal: Electrolyte Balance  Outcome: Progressing  Goal: Optimal Oral Intake  Outcome: Progressing  Goal: Acceptable Pain Control  Outcome: Progressing     Problem: Comorbidity Management  Goal: Maintenance of Behavioral Health Symptom Control  Outcome: Progressing     Problem: Mobility Impairment  Goal: Optimal Mobility  Outcome: Progressing  Intervention: Optimize Mobility  Recent Flowsheet Documentation  Taken 4/30/2025 1620 by Yessica Sunshine RN  Assistive Device  Utilized:   gait belt   walker  Activity Management:   ambulated to bathroom   up in chair     Problem: Hip Fracture Surgical Repair  Goal: Optimal Functional Ability  Intervention: Promote Optimal Functional Status  Recent Flowsheet Documentation  Taken 4/30/2025 1620 by Yessica Sunshine RN  Activity Management:   ambulated to bathroom   up in chair  Goal: Anesthesia/Sedation Recovery  Intervention: Optimize Anesthesia Recovery  Recent Flowsheet Documentation  Taken 4/30/2025 1620 by Yessica Sunshine RN  Administration (IS): instruction provided, follow-up  Level Incentive Spirometer (mL): 400  Incentive Spirometer Predicted Level (mL): 2500  Number of Repetitions (IS): 10  Patient Tolerance (IS): good  Goal: Effective Oxygenation and Ventilation  Intervention: Optimize Oxygenation and Ventilation  Recent Flowsheet Documentation  Taken 4/30/2025 1620 by Yessica Sunshine RN  Head of Bed (HOB) Positioning: HOB at 20-30 degrees     Problem: Chronic Kidney Disease  Goal: Fluid Balance  Intervention: Monitor and Manage Hypervolemia  Recent Flowsheet Documentation  Taken 4/30/2025 1620 by Yessica Sunshine RN  Skin Protection: adhesive use limited  Goal: Optimal Functional Ability  Intervention: Optimize Functional Ability  Recent Flowsheet Documentation  Taken 4/30/2025 1620 by Yessica Sunshine RN  Activity Management:   ambulated to bathroom   up in chair   Goal Outcome Evaluation:

## 2025-05-01 NOTE — PLAN OF CARE
Problem: Adult Inpatient Plan of Care  Goal: Plan of Care Review  Description: The Plan of Care Review/Shift note should be completed every shift.  The Outcome Evaluation is a brief statement about your assessment that the patient is improving, declining, or no change.  This information will be displayed automatically on your shiftnote.  Outcome: Progressing     Problem: Hip Fracture Surgical Repair  Goal: Optimal Functional Ability  Outcome: Progressing  Intervention: Promote Optimal Functional Status  Recent Flowsheet Documentation  Taken 5/1/2025 0830 by Madeleine Bustamante, RN  Activity Management:   activity adjusted per tolerance   activity encouraged     Problem: Hip Fracture Surgical Repair  Goal: Absence of Bleeding  Outcome: Progressing     Problem: Hip Fracture Surgical Repair  Goal: Optimal Pain Control and Function  Outcome: Progressing  Intervention: Prevent or Manage Pain  Recent Flowsheet Documentation  Taken 5/1/2025 0754 by Madeleine Bustamante, RN  Pain Management Interventions:   medication (see MAR)   cold applied   food   Goal Outcome Evaluation:       Surigcal hip dressing C/D/I. PNV intact. Minimal bruising to R hip. Ambulating well with Ax1. Pain controlled with current regimen, PRN oxy x1. Good appetite, no BM yet.

## 2025-05-01 NOTE — PLAN OF CARE
"  Problem: Adult Inpatient Plan of Care  Goal: Plan of Care Review  Description: The Plan of Care Review/Shift note should be completed every shift.  The Outcome Evaluation is a brief statement about your assessment that the patient is improving, declining, or no change.  This information will be displayed automatically on your shiftnote.  Outcome: Progressing  Goal: Patient-Specific Goal (Individualized)  Description: You can add care plan individualizations to a care plan. Examples of Individualization might be:  \"Parent requests to be called daily at 9am for status\", \"I have a hard time hearing out of my right ear\", or \"Do not touch me to wake me up as it startlesme\".  Outcome: Progressing  Goal: Absence of Hospital-Acquired Illness or Injury  Outcome: Progressing  Intervention: Identify and Manage Fall Risk  Recent Flowsheet Documentation  Taken 5/1/2025 0026 by Veronica Parker RN  Safety Promotion/Fall Prevention:   activity supervised   lighting adjusted   safety round/check completed  Intervention: Prevent Skin Injury  Recent Flowsheet Documentation  Taken 5/1/2025 0026 by Veronica Parker RN  Body Position:   position changed independently   refuses positioning  Skin Protection: adhesive use limited  Intervention: Prevent and Manage VTE (Venous Thromboembolism) Risk  Recent Flowsheet Documentation  Taken 5/1/2025 0026 by Veronica Parker RN  VTE Prevention/Management: SCDs on (sequential compression devices)  Intervention: Prevent Infection  Recent Flowsheet Documentation  Taken 5/1/2025 0026 by Veronica Parker RN  Infection Prevention:   rest/sleep promoted   single patient room provided  Goal: Optimal Comfort and Wellbeing  Outcome: Progressing  Intervention: Monitor Pain and Promote Comfort  Recent Flowsheet Documentation  Taken 5/1/2025 0026 by Veronica Parker RN  Pain Management Interventions: cold applied  Goal: Readiness for Transition of Care  Outcome: Progressing   Goal Outcome " Evaluation:         Pt a/o with VSS on room air. Pt denies pain, taking scheduled tylenol. Pt having increased pain this am shooting in hip. Pt given prn oxycodone.

## 2025-05-01 NOTE — PROGRESS NOTES
Orthopedic Progress Note      Assessment: 2 Days Post-Op  S/P Procedure(s):  INTERNAL FIXATION, FRACTURE, TROCHANTERIC, HIP, USING PINS OR RODS     Plan:   Activity: Up with assist and assistive device (walker) at all times.  Weight bearing status: WBAT RLE.  Pain management: Transition from IV to PO as tolerated. Judicious narcotic use given    Antibiotics: Ancef x 24 hours.  Diet: Begin with clear fluids and progress diet as tolerated.   DVT prophylaxis: ASA 81 mg BID x 4 weeks to begin on POD#1 and mechanical while in the hospital  Labs: Hgb POD#1 and 2, check as needed thereafter. 10.4 Today, 11.4 Yesterday. Transfuse for Hbg <7.0   Dressings: Keep clean, dry and intact until follow up.   Elevation: Elevate RLE on pillows to keep above the level of the heart as much as possible.   Physical Therapy/Occupational Therapy: Eval and treat, appreciate assistance and recommendations.  Follow-up: Please follow-up with Dr. Oswald's team in 2 weeks for incision check, suture removal, and repeat x-rays needed at [facility]. Call our scheduling line at [phone] to make an appointment.  -- Recommend outpatient bone health evaluation including DEXA scan, with medical management as indicated. Tentative plan to coordinate follow with Dr. Castillo at [facility]. Vitamin D and Ca supplementation for the time being pending work up.   Disposition: Pending progress with therapies, pain control, and medical stability, and TCU placement.    Subjective:  Pain: mild  Nausea, Vomiting:  No  Lightheadedness, Dizziness:  No  Neuro:  Patient denies new onset numbness or paresthesias  Fever, chills: No  Chest pain: No  SOB: No    Patient reports feeling well today. Patient reports pain is tolerable with current pain regimen. He does state he continues to have an occasional cramping/sharp pain in his anterior leg/quad. He did have a dose of 2.5 oxycodone which helped this pain. He denies any radicular pain. He states his  "right leg continues to feel weak, with not much improvement since yesterday. We discussed that at this time, both PT and OT are recommending going to a TCU upon discharge. He understands and is in agreement with this. Patient eating and drinking well. All questions/concerns answered.      Objective:  BP (!) 167/74 (BP Location: Left arm)   Pulse 80   Temp 98.3  F (36.8  C) (Oral)   Resp 18   Ht 1.753 m (5' 9\")   Wt 64.4 kg (142 lb)   SpO2 96%   BMI 20.97 kg/m      The patient is A&Ox3. Appears comfortable, sitting up in chair  Calves without tenderness, neg Lelo's  Brisk capillary refill in the toes.   Palpable Right dorsalis pedis pulse. Right foot warm & well-perfused.  Sensation is intact to light touch & equal bilaterally in the femoral, DP, SP & tibial nerve distributions.  ROM: Appropriately flexes & extends all toes bilaterally.   Motor: +5/5 dorsiflexion, plantar flexion & EHL bilaterally.   Leg lengths equal.  Dressing C/D/I without strikethrough, no surrounding erythema.      Pertinent Labs   Lab Results: personally reviewed.   Lab Results   Component Value Date    INR 1.03 04/29/2025    PROTIME 13.8 04/29/2025     Lab Results   Component Value Date    WBC 7.2 05/01/2025    HGB 10.4 (L) 05/01/2025    HCT 30.5 (L) 05/01/2025    MCV 93 05/01/2025     (L) 05/01/2025     Lab Results   Component Value Date     04/30/2025    CO2 25 04/30/2025         Report completed by:  Doreen Delgadillo PA-C  Date: 05/01/2025  [facility]             "

## 2025-05-01 NOTE — PROGRESS NOTES
Care Management Follow Up    Length of Stay (days): 2    Expected Discharge Date: 05/02/2025    Anticipated Discharge Plan:   TCU     Transportation: Anticipate Family/friend    PT Recommendations: Transitional Care Facility  OT Recommendations:  Transitional Care Facility     Barriers to Discharge: medical stability, placement    Prior Living Situation: apartment with spouse, child(mere), adult    Discussed  Partnership in Safe Discharge Planning  document with patient/family: No     Handoff Completed: No, handoff not indicated or clinically appropriate    Patient/Spokesperson Updated: Yes. Who? Patient     Additional Information:    Met with patient to discuss discharge recs for TCU; he is in agreement with this.  States he wants to go to [facility] in [place], does not provide other choices.  Reviewed will send referral here, advised will need additional choices if they are not able to accept/admit.  Also reviewed possible OOP cost of private room in TCU; he requests additional info on this.      Referral sent to [facility]; also called admissions, LVM with [facility] inquiring on private room charge.      Next Steps:     CM to follow up with patient for possible additional TCU choices, update of private room charge at [facility].  Review and confirm with patient transportation; did discuss possible OOP costs for scheduled wheelchair transportation, he states he feels he can go in car with his family upon discharge.     JESÚS Harvey    Notification received from [facility], they are able to accept patient tomorrow, 05/02; they report private room rate is $24.00/day.  Met with patient, reviewed and he is accepting of $24.00/day private room rate.  He also states he has family able to provide transportation; inquired and offered to talk with family, patient declines saying he will talk with family himself.  Message with hospitalist advising of accepting TCU for tomorrow if he is stable to discharge.  CM to  continue to follow and confirm discharge plans.    PAS completed - [id number]    1:43 PM

## 2025-05-01 NOTE — PROGRESS NOTES
[facility]    Medicine Progress Note - Hospitalist Service    Date of Admission:  4/29/2025    Assessment & Plan   89 year old male with history of CKD3, HLD, HTN, GERD, and mood disorder admitted on 4/29/2025 with right hip fracture.          Right hip fracture  Mechanical fall while playing pickle ball   S/P surgical repair 4/29/25  -- post op cares per ortho  -- plan discharge to TCU        CKD3a:  -- GFR baseline     Hypertension   Well-controlled with diet / regular exercise    -- prn hydralazine  -- Pain control    -- Continue to monitor        Hyperlipidemia -- Not currently taking medication       Mood disorder -- Continue PTA amitriptyline      GERD: continue home PPI                Diet: Advance Diet as Tolerated: Regular Diet Adult  Diet    DVT Prophylaxis: ASA 81mg BID  Peterson Catheter: Not present  Lines: None     Cardiac Monitoring: None  Code Status: Full Code      Clinically Significant Risk Factors          # Hyperchloremia: Highest Cl = 109 mmol/L in last 2 days, will monitor as appropriate                             # Financial/Environmental Concerns: none         Disposition Plan   Medically Ready for Discharge: Anticipated Tomorrow      Buzz Arroyo,   Hospitalist Service  [facility]  Securely message with [employer] (more info)  Text page via [employer] Paging/Directory   ______________________________________________________________________    Interval History   NAD. Denies any current complaints    Physical Exam   Vital Signs: Temp: 98.3  F (36.8  C) Temp src: Oral BP: (!) 167/74 Pulse: 80   Resp: 18 SpO2: 96 % O2 Device: None (Room air)    Weight: 142 lbs 0 oz  General: NAD  RESPIRATORY: Breathing nonlabored  CARDIOVASCULAR: No le edema bilat.   NEUROLOGIC: Motor intact, speech clear         >50 MINUTES SPENT BY ME on the date of service doing chart review, history, exam, documentation & further activities per the note.  Data

## 2025-05-02 ENCOUNTER — APPOINTMENT (OUTPATIENT)
Dept: PHYSICAL THERAPY | Facility: HOSPITAL | Age: OVER 89
DRG: 481 | End: 2025-05-02
Payer: MEDICARE

## 2025-05-02 VITALS
OXYGEN SATURATION: 96 % | BODY MASS INDEX: 21.03 KG/M2 | HEIGHT: 69 IN | WEIGHT: 142 LBS | HEART RATE: 78 BPM | TEMPERATURE: 98.3 F | RESPIRATION RATE: 18 BRPM | SYSTOLIC BLOOD PRESSURE: 136 MMHG | DIASTOLIC BLOOD PRESSURE: 68 MMHG

## 2025-05-02 LAB
ANION GAP SERPL CALCULATED.3IONS-SCNC: 15 MMOL/L (ref 7–15)
ATRIAL RATE - MUSE: 96 BPM
BUN SERPL-MCNC: 29.9 MG/DL (ref 8–23)
CALCIUM SERPL-MCNC: 8.8 MG/DL (ref 8.8–10.4)
CHLORIDE SERPL-SCNC: 105 MMOL/L (ref 98–107)
CREAT SERPL-MCNC: 1.26 MG/DL (ref 0.67–1.17)
DIASTOLIC BLOOD PRESSURE - MUSE: 83 MMHG
EGFRCR SERPLBLD CKD-EPI 2021: 54 ML/MIN/1.73M2
ERYTHROCYTE [DISTWIDTH] IN BLOOD BY AUTOMATED COUNT: 14.3 % (ref 10–15)
GLUCOSE BLDC GLUCOMTR-MCNC: 111 MG/DL (ref 70–99)
GLUCOSE SERPL-MCNC: 151 MG/DL (ref 70–99)
HCO3 SERPL-SCNC: 22 MMOL/L (ref 22–29)
HCT VFR BLD AUTO: 35.2 % (ref 40–53)
HGB BLD-MCNC: 11.7 G/DL (ref 13.3–17.7)
INTERPRETATION ECG - MUSE: NORMAL
MCH RBC QN AUTO: 31.3 PG (ref 26.5–33)
MCHC RBC AUTO-ENTMCNC: 33.2 G/DL (ref 31.5–36.5)
MCV RBC AUTO: 94 FL (ref 78–100)
P AXIS - MUSE: 73 DEGREES
PLATELET # BLD AUTO: 221 10E3/UL (ref 150–450)
POTASSIUM SERPL-SCNC: 3.4 MMOL/L (ref 3.4–5.3)
PR INTERVAL - MUSE: 130 MS
QRS DURATION - MUSE: 82 MS
QT - MUSE: 362 MS
QTC - MUSE: 457 MS
R AXIS - MUSE: 61 DEGREES
RBC # BLD AUTO: 3.74 10E6/UL (ref 4.4–5.9)
SODIUM SERPL-SCNC: 142 MMOL/L (ref 135–145)
SYSTOLIC BLOOD PRESSURE - MUSE: 142 MMHG
T AXIS - MUSE: 57 DEGREES
VENTRICULAR RATE- MUSE: 96 BPM
WBC # BLD AUTO: 11.4 10E3/UL (ref 4–11)

## 2025-05-02 PROCEDURE — 250N000013 HC RX MED GY IP 250 OP 250 PS 637: Performed by: STUDENT IN AN ORGANIZED HEALTH CARE EDUCATION/TRAINING PROGRAM

## 2025-05-02 PROCEDURE — 258N000003 HC RX IP 258 OP 636: Performed by: INTERNAL MEDICINE

## 2025-05-02 PROCEDURE — 250N000013 HC RX MED GY IP 250 OP 250 PS 637: Performed by: HOSPITALIST

## 2025-05-02 PROCEDURE — 80048 BASIC METABOLIC PNL TOTAL CA: CPT | Performed by: INTERNAL MEDICINE

## 2025-05-02 PROCEDURE — 85014 HEMATOCRIT: CPT | Performed by: INTERNAL MEDICINE

## 2025-05-02 PROCEDURE — 250N000011 HC RX IP 250 OP 636

## 2025-05-02 PROCEDURE — 36415 COLL VENOUS BLD VENIPUNCTURE: CPT | Performed by: INTERNAL MEDICINE

## 2025-05-02 PROCEDURE — 99233 SBSQ HOSP IP/OBS HIGH 50: CPT | Performed by: INTERNAL MEDICINE

## 2025-05-02 PROCEDURE — 120N000001 HC R&B MED SURG/OB

## 2025-05-02 PROCEDURE — 250N000013 HC RX MED GY IP 250 OP 250 PS 637

## 2025-05-02 PROCEDURE — 97110 THERAPEUTIC EXERCISES: CPT | Mod: GP

## 2025-05-02 RX ORDER — SODIUM CHLORIDE 9 MG/ML
INJECTION, SOLUTION INTRAVENOUS CONTINUOUS
Status: DISCONTINUED | OUTPATIENT
Start: 2025-05-02 | End: 2025-05-03

## 2025-05-02 RX ADMIN — ACETAMINOPHEN 975 MG: 325 TABLET ORAL at 23:47

## 2025-05-02 RX ADMIN — ASPIRIN 81 MG: 81 TABLET, COATED ORAL at 08:51

## 2025-05-02 RX ADMIN — POLYETHYLENE GLYCOL 3350 17 G: 17 POWDER, FOR SOLUTION ORAL at 08:51

## 2025-05-02 RX ADMIN — SODIUM CHLORIDE: 9 INJECTION, SOLUTION INTRAVENOUS at 16:25

## 2025-05-02 RX ADMIN — ACETAMINOPHEN 975 MG: 325 TABLET ORAL at 16:21

## 2025-05-02 RX ADMIN — ONDANSETRON 4 MG: 4 TABLET, ORALLY DISINTEGRATING ORAL at 09:17

## 2025-05-02 RX ADMIN — ASPIRIN 81 MG: 81 TABLET, COATED ORAL at 20:34

## 2025-05-02 RX ADMIN — ACETAMINOPHEN 975 MG: 325 TABLET ORAL at 08:51

## 2025-05-02 RX ADMIN — PANTOPRAZOLE SODIUM 40 MG: 40 TABLET, DELAYED RELEASE ORAL at 06:06

## 2025-05-02 RX ADMIN — SENNOSIDES AND DOCUSATE SODIUM 1 TABLET: 50; 8.6 TABLET ORAL at 20:34

## 2025-05-02 RX ADMIN — SENNOSIDES AND DOCUSATE SODIUM 1 TABLET: 50; 8.6 TABLET ORAL at 08:51

## 2025-05-02 RX ADMIN — AMITRIPTYLINE HYDROCHLORIDE 25 MG: 25 TABLET, FILM COATED ORAL at 21:10

## 2025-05-02 ASSESSMENT — ACTIVITIES OF DAILY LIVING (ADL)
ADLS_ACUITY_SCORE: 41
ADLS_ACUITY_SCORE: 41
ADLS_ACUITY_SCORE: 42
ADLS_ACUITY_SCORE: 42
ADLS_ACUITY_SCORE: 30
ADLS_ACUITY_SCORE: 30
ADLS_ACUITY_SCORE: 41
ADLS_ACUITY_SCORE: 40
ADLS_ACUITY_SCORE: 30
ADLS_ACUITY_SCORE: 42
ADLS_ACUITY_SCORE: 30
ADLS_ACUITY_SCORE: 42
ADLS_ACUITY_SCORE: 40
ADLS_ACUITY_SCORE: 30
ADLS_ACUITY_SCORE: 42
ADLS_ACUITY_SCORE: 30
ADLS_ACUITY_SCORE: 42
ADLS_ACUITY_SCORE: 30
ADLS_ACUITY_SCORE: 42
ADLS_ACUITY_SCORE: 30
ADLS_ACUITY_SCORE: 41

## 2025-05-02 NOTE — PROGRESS NOTES
MD present at bedside after Code Blue called. Pt passed out after being up to br and then bending over to put on shoe. Pt lifted to chair by three staff. . Lavbs being drawn. Pt came to, very sweaty, clammy. Monitoring at this time.

## 2025-05-02 NOTE — PROGRESS NOTES
Care Management Discharge Note    Discharge Date: 05/02/2025       Discharge Disposition:  TCU     Discharge Services:      Discharge DME:      Discharge Transportation: wheelchair, 2:35p - 3:20p     Private pay costs discussed: transportation costs    Does the patient's insurance plan have a 3 day qualifying hospital stay waiver?  No    PAS Confirmation Code: [id number]  Patient/family educated on Medicare website which has current facility and service quality ratings:      Education Provided on the Discharge Plan:  yes  Persons Notified of Discharge Plans: patient   Patient/Family in Agreement with the Plan:  yes    Handoff Referral Completed: No, handoff not indicated or clinically appropriate    Additional Information:    Patient medically stable to discharge today; discharge to [facility]note [facility] T[facility].  Reviewed with patient, he is requesting wheelchair transportation; scheduled  2:35p - 3:20p.  Updated TCU of transportation time.     JESÚS Harvey    Update received from ortho and hospitalist; patient having increased nausea today, want to hold discharge to tomorrow.  Called [facility], they confirm they can accept patient tomorrow - weekend phone line [phone].  Updated hospitalist and patient of this.    10:25 AM

## 2025-05-02 NOTE — PROGRESS NOTES
Orthopedic Progress Note      Assessment: 3 Days Post-Op  S/P Procedure(s):  INTERNAL FIXATION, FRACTURE, TROCHANTERIC, HIP, USING PINS OR RODS     Plan:   Activity: Up with assist and assistive device (walker) at all times.  Weight bearing status: WBAT RLE.  Pain management: Continue current regimen  Diet: Begin with clear fluids and progress diet as tolerated.   DVT prophylaxis: ASA 81 mg BID x 4 weeks to begin on POD#1 and mechanical while in the hospital  Labs: Hgb 11.7 transfuse for Hbg <7.0   Dressings: Keep clean, dry and intact until follow up.   Elevation: Elevate RLE on pillows to keep above the level of the heart as much as possible.   Physical Therapy/Occupational Therapy: Eval and treat, appreciate assistance and recommendations.  Follow-up: Please follow-up with Dr. Oswald's team in 2 weeks for incision check, suture removal, and repeat x-rays needed at [facility]. Call our scheduling line at [phone] to make an appointment.  -- Recommend outpatient bone health evaluation including DEXA scan, with medical management as indicated. Tentative plan to coordinate follow with Dr. Castillo at [facility]. Vitamin D and Ca supplementation for the time being pending work up.   Disposition: Pending medical stability and TCU placement.  Orthopedically stable for discharge    Subjective:  Patient is having some nausea during the time my visit today, he has not vomited.  He is getting Zofran during my visit.  He seems more hesitant to discharge later today.  He unfortunately had a vasovagal syncopal episode this morning while bending down to  his bags, discharge was delayed until this afternoon, patient states to me that he feels like he might be more comfortable just not discharging today for further monitoring and discharging tomorrow if possible.  He is not having any concerns regarding his hip, pain is well-controlled.  Denies fever/chills      Objective:  /52   Pulse 104    "Temp 98.3  F (36.8  C) (Oral)   Resp 18   Ht 1.753 m (5' 9\")   Wt 64.4 kg (142 lb)   SpO2 94%   BMI 20.97 kg/m      The patient is A&Ox3. Appears comfortable, sitting up in chair  Calves without tenderness, neg Lelo's  Brisk capillary refill in the toes.   Palpable Right dorsalis pedis pulse. Right foot warm & well-perfused.  Sensation is intact to light touch & equal bilaterally in the femoral, DP, SP & tibial nerve distributions.  ROM: Appropriately flexes & extends all toes bilaterally.   Motor: +5/5 dorsiflexion, plantar flexion & EHL bilaterally.   Leg lengths equal.  Dressing C/D/I without strikethrough, no surrounding erythema.      Pertinent Labs   Lab Results: personally reviewed.   Lab Results   Component Value Date    INR 1.03 04/29/2025    PROTIME 13.8 04/29/2025     Lab Results   Component Value Date    WBC 11.4 (H) 05/02/2025    HGB 11.7 (L) 05/02/2025    HCT 35.2 (L) 05/02/2025    MCV 94 05/02/2025     05/02/2025     Lab Results   Component Value Date     05/02/2025    CO2 22 05/02/2025         ROSE MARY MONTOYA PA-C  Date: 05/02/2025  [facility]             "

## 2025-05-02 NOTE — PLAN OF CARE
Problem: Adult Inpatient Plan of Care  Goal: Plan of Care Review  Description: The Plan of Care Review/Shift note should be completed every shift.  The Outcome Evaluation is a brief statement about your assessment that the patient is improving, declining, or no change.  This information will be displayed automatically on your shiftnote.  Outcome: Progressing     Problem: Delirium  Goal: Optimal Coping  Outcome: Progressing     Problem: Hip Fracture Surgical Repair  Goal: Optimal Coping with Change in Health Status  Outcome: Progressing     Problem: Adult Inpatient Plan of Care  Goal: Absence of Hospital-Acquired Illness or Injury  Intervention: Identify and Manage Fall Risk  Recent Flowsheet Documentation  Taken 5/1/2025 1654 by Berta Pierre RN  Safety Promotion/Fall Prevention:   activity supervised   assistive device/personal items within reach   clutter free environment maintained   nonskid shoes/slippers when out of bed   room near nurse's station   room door open   safety round/check completed  Intervention: Prevent Skin Injury  Recent Flowsheet Documentation  Taken 5/1/2025 2226 by Berta Pierre RN  Body Position: (PT. SLEEPING)   other (see comments)   supine  Taken 5/1/2025 2026 by Berta Pierre RN  Body Position:   turned   right  Taken 5/1/2025 1826 by Berta Pierre RN  Body Position: sitting up in bed  Taken 5/1/2025 1626 by Berta Pierre RN  Body Position: (SITTING IN CHAIR)   other (see comments)   supine  Intervention: Prevent Infection  Recent Flowsheet Documentation  Taken 5/1/2025 1654 by Berta Pierre RN  Infection Prevention:   hand hygiene promoted   rest/sleep promoted     Problem: Delirium  Goal: Improved Behavioral Control  Intervention: Minimize Safety Risk  Recent Flowsheet Documentation  Taken 5/1/2025 1654 by Berta Pierre RN  Enhanced Safety Measures:   patient/family teach back on injury risk   pain management   Pre/Post Op education on fall prevention   review  medications for side effects with activity     Problem: Hip Fracture Surgical Repair  Goal: Optimal Functional Ability  Intervention: Promote Optimal Functional Status  Recent Flowsheet Documentation  Taken 5/1/2025 1654 by Berta Pierre RN  Activity Management:   activity adjusted per tolerance   activity encouraged  Goal: Anesthesia/Sedation Recovery  Intervention: Optimize Anesthesia Recovery  Recent Flowsheet Documentation  Taken 5/1/2025 1654 by Berta Pierre, RN  Safety Promotion/Fall Prevention:   activity supervised   assistive device/personal items within reach   clutter free environment maintained   nonskid shoes/slippers when out of bed   room near nurse's station   room door open   safety round/check completed  Goal: Effective Oxygenation and Ventilation  Intervention: Optimize Oxygenation and Ventilation  Recent Flowsheet Documentation  Taken 5/1/2025 2026 by Berta Pierre RN  Head of Bed (HOB) Positioning: HOB at 30-45 degrees  Taken 5/1/2025 1826 by Berta Pierre RN  Head of Bed (HOB) Positioning: HOB at 30-45 degrees  Taken 5/1/2025 1626 by Berta Pierre RN  Head of Bed (HOB) Positioning: HOB at 60-90 degrees     Problem: Chronic Kidney Disease  Goal: Optimal Functional Ability  Intervention: Optimize Functional Ability  Recent Flowsheet Documentation  Taken 5/1/2025 1654 by Berta Pierre RN  Activity Management:   activity adjusted per tolerance   activity encouraged  Goal: Minimize Renal Failure Effects  Intervention: Monitor and Support Renal Function  Recent Flowsheet Documentation  Taken 5/1/2025 1654 by Berta Pierre RN  Medication Review/Management: medications reviewed     Problem: Comorbidity Management  Goal: Maintenance of Behavioral Health Symptom Control  Intervention: Maintain Behavioral Health Symptom Control  Recent Flowsheet Documentation  Taken 5/1/2025 1654 by Berta Pierre RN  Medication Review/Management: medications reviewed  Goal: Blood Pressure in  Desired Range  Intervention: Maintain Blood Pressure Management  Recent Flowsheet Documentation  Taken 5/1/2025 1654 by Berta Pierre, RN  Medication Review/Management: medications reviewed     Problem: Mobility Impairment  Goal: Optimal Mobility  Intervention: Optimize Mobility  Recent Flowsheet Documentation  Taken 5/1/2025 2226 by Berta Pierre, RN  Positioning/Transfer Devices:   pillows   in use  Taken 5/1/2025 2026 by Berta Pierre, RANDY  Positioning/Transfer Devices:   pillows   in use  Taken 5/1/2025 1826 by Berta Pierre, RANDY  Positioning/Transfer Devices:   pillows   in use  Taken 5/1/2025 1654 by Berta Pierre, RN  Assistive Device Utilized:   walker   gait belt  Activity Management:   activity adjusted per tolerance   activity encouraged  Taken 5/1/2025 1626 by Berta Pierre, RN  Positioning/Transfer Devices:   pillows   in use     Problem: Fall Injury Risk  Goal: Absence of Fall and Fall-Related Injury  Intervention: Identify and Manage Contributors  Recent Flowsheet Documentation  Taken 5/1/2025 1654 by Berta Pierre, RN  Medication Review/Management: medications reviewed  Intervention: Promote Injury-Free Environment  Recent Flowsheet Documentation  Taken 5/1/2025 1654 by Berta Pierre, RN  Safety Promotion/Fall Prevention:   activity supervised   assistive device/personal items within reach   clutter free environment maintained   nonskid shoes/slippers when out of bed   room near nurse's station   room door open   safety round/check completed   Goal Outcome Evaluation:                    Pt. Alert and oriented. Had pain this shift and oxy, scheduled tylenol given with relief. RA. Assist of 1 with transfer. Continue with POC.       <-- Click to add NO pertinent Past Medical History 0

## 2025-05-02 NOTE — PLAN OF CARE
"  Problem: Adult Inpatient Plan of Care  Goal: Plan of Care Review  Description: The Plan of Care Review/Shift note should be completed every shift.  The Outcome Evaluation is a brief statement about your assessment that the patient is improving, declining, or no change.  This information will be displayed automatically on your shiftnote.  Outcome: Progressing     Problem: Adult Inpatient Plan of Care  Goal: Patient-Specific Goal (Individualized)  Description: You can add care plan individualizations to a care plan. Examples of Individualization might be:  \"Parent requests to be called daily at 9am for status\", \"I have a hard time hearing out of my right ear\", or \"Do not touch me to wake me up as it startlesme\".  Outcome: Progressing     Problem: Delirium  Goal: Improved Sleep  Outcome: Progressing     Problem: Hip Fracture Surgical Repair  Goal: Optimal Coping with Change in Health Status  Outcome: Progressing     Problem: Hip Fracture Surgical Repair  Goal: Absence of Bleeding  Outcome: Progressing   Goal Outcome Evaluation:  A&Ox4. Afebrile, /68. Pain relieved with scheduled Tylenol. Repositioning done. Slept well.                      "

## 2025-05-02 NOTE — PROGRESS NOTES
[facility]    Medicine Progress Note - Hospitalist Service    Date of Admission:  4/29/2025    Assessment & Plan   Carlos Faith is a 89 year old male with history of CKD3, HLD, HTN, GERD, and mood disorder admitted on 4/29/2025 with right hip fracture.  He underwent surgical repair on 4/29/2025. His stay was complicated by a syncopal episode on 5/2/2025.     Syncopal event- likely vasovagal  -- was getting ready for the day 5/2 and became dizzy/had a syncopal events and when he was alert was sweating but felt better  -- vitals stable  -- labs without contributing factors     Right hip fracture  Mechanical fall while playing pickle ball   S/P surgical repair 4/29/25  -- post op cares per ortho  -- plan discharge to TCU    Leukocytosis  -- up to 11.4 on 5/2  -- likely related to recovery from surgery  -- will monitor     JASE on CKD3a  -- GFR baseline on admission, up to 1.26 on 5/2  -- will monitor  -- started on IVF with NS at 50 mL/hr     History of Hypertension   Well-controlled with diet / regular exercise    -- prn hydralazine  -- Pain control    -- Continue to monitor      Hyperlipidemia   -- Not currently taking medication     Mood disorder   -- Continue PTA amitriptyline    GERD  -- continue home PPI        Diet: Advance Diet as Tolerated: Regular Diet Adult  Diet    DVT Prophylaxis: DOAC  Peterson Catheter: Not present  Lines: None     Cardiac Monitoring: None  Code Status: Full Code      Clinically Significant Risk Factors                                  # Financial/Environmental Concerns: none         Social Drivers of Health    Social Connections: Unknown (1/6/2025)    Social Connection and Isolation Panel [NHANES]     Frequency of Social Gatherings with Friends and Family: Three times a week          Disposition Plan     Medically Ready for Discharge: Anticipated Tomorrow             Doreen Russell MD  Hospitalist Service  [facility]  Securely  message with [employer] (more info)  Text page via [facility] Paging/Directory   ______________________________________________________________________    Interval History   Mr. Faith was doing well this morning and had gotten up to get ready.  After he left the bathroom he went down to fix issue and had a syncopal event.  He became dizzy and sweaty.  It was called as a CODE BLUE however he woke up after just a few seconds and was feeling fine.  He did not feel back to baseline however so his discharge to TCU was canceled.  Labs were drawn which show a new leukocytosis I suspect from surgery and an JASE.  Will give some gentle fluids as he may be dehydrated.    Physical Exam   Vital Signs: Temp: 97.8  F (36.6  C) Temp src: Oral BP: 118/56 Pulse: 88   Resp: 18 SpO2: 93 % O2 Device: None (Room air)    Weight: 142 lbs 0 oz    General Appearance: Awake, alert, in no acute distress  Respiratory: CTAB, no wheeze  Cardiovascular: RRR, no murmur noted  GI: soft, nontender, non distended, normal bowel sounds  Skin: no jaundice, no rash    Medical Decision Making       50 MINUTES SPENT BY ME on the date of service doing chart review, history, exam, documentation & further activities per the note.      Data     I have personally reviewed the following data over the past 24 hrs:    11.4 (H)  \   11.7 (L)   / 221     142 105 29.9 (H) /  151 (H)   3.4 22 1.26 (H) \       Imaging results reviewed over the past 24 hrs:   No results found for this or any previous visit (from the past 24 hours).

## 2025-05-02 NOTE — PLAN OF CARE
"  Problem: Adult Inpatient Plan of Care  Goal: Plan of Care Review  Description: The Plan of Care Review/Shift note should be completed every shift.  The Outcome Evaluation is a brief statement about your assessment that the patient is improving, declining, or no change.  This information will be displayed automatically on your shiftnote.  Outcome: Progressing  Goal: Patient-Specific Goal (Individualized)  Description: You can add care plan individualizations to a care plan. Examples of Individualization might be:  \"Parent requests to be called daily at 9am for status\", \"I have a hard time hearing out of my right ear\", or \"Do not touch me to wake me up as it startlesme\".  Outcome: Progressing  Goal: Absence of Hospital-Acquired Illness or Injury  Outcome: Progressing  Intervention: Identify and Manage Fall Risk  Recent Flowsheet Documentation  Taken 5/2/2025 0915 by Amie Sotelo, RN  Safety Promotion/Fall Prevention:   activity supervised   lighting adjusted   mobility aid in reach   nonskid shoes/slippers when out of bed   room near nurse's station   room organization consistent  Goal: Optimal Comfort and Wellbeing  Outcome: Progressing  Goal: Readiness for Transition of Care  Outcome: Progressing     Problem: Hip Fracture Surgical Repair  Goal: Nausea and Vomiting Relief  Outcome: Progressing     Problem: Fall Injury Risk  Goal: Absence of Fall and Fall-Related Injury  Outcome: Progressing  Intervention: Identify and Manage Contributors  Recent Flowsheet Documentation  Taken 5/2/2025 0915 by Amie Sotelo, RN  Medication Review/Management: medications reviewed  Intervention: Promote Injury-Free Environment  Recent Flowsheet Documentation  Taken 5/2/2025 0915 by Amie Sotelo, RN  Safety Promotion/Fall Prevention:   activity supervised   lighting adjusted   mobility aid in reach   nonskid shoes/slippers when out of bed   room near nurse's station   room organization consistent   Goal Outcome " Evaluation:       Pt lethargic/sleepy post RR/Code blue called this am after pt passed out when getting back to chair from br and also putting on rt shoe. Pt c/o nausea mid am, SL Zofran effective. MD KOJO updated. Pt awake early afternoon. Stated he did not want lunch but an early dinner. Asymptomatic at this time.

## 2025-05-03 VITALS
TEMPERATURE: 98.7 F | BODY MASS INDEX: 21.03 KG/M2 | HEART RATE: 96 BPM | OXYGEN SATURATION: 94 % | DIASTOLIC BLOOD PRESSURE: 67 MMHG | SYSTOLIC BLOOD PRESSURE: 132 MMHG | RESPIRATION RATE: 16 BRPM | HEIGHT: 69 IN | WEIGHT: 142 LBS

## 2025-05-03 LAB
ANION GAP SERPL CALCULATED.3IONS-SCNC: 7 MMOL/L (ref 7–15)
BUN SERPL-MCNC: 31 MG/DL (ref 8–23)
CALCIUM SERPL-MCNC: 8.3 MG/DL (ref 8.8–10.4)
CHLORIDE SERPL-SCNC: 108 MMOL/L (ref 98–107)
CREAT SERPL-MCNC: 1.1 MG/DL (ref 0.67–1.17)
EGFRCR SERPLBLD CKD-EPI 2021: 64 ML/MIN/1.73M2
ERYTHROCYTE [DISTWIDTH] IN BLOOD BY AUTOMATED COUNT: 14.1 % (ref 10–15)
GLUCOSE SERPL-MCNC: 92 MG/DL (ref 70–99)
HCO3 SERPL-SCNC: 25 MMOL/L (ref 22–29)
HCT VFR BLD AUTO: 27.6 % (ref 40–53)
HGB BLD-MCNC: 9.3 G/DL (ref 13.3–17.7)
MCH RBC QN AUTO: 31.3 PG (ref 26.5–33)
MCHC RBC AUTO-ENTMCNC: 33.7 G/DL (ref 31.5–36.5)
MCV RBC AUTO: 93 FL (ref 78–100)
PLATELET # BLD AUTO: 158 10E3/UL (ref 150–450)
POTASSIUM SERPL-SCNC: 4 MMOL/L (ref 3.4–5.3)
RBC # BLD AUTO: 2.97 10E6/UL (ref 4.4–5.9)
SODIUM SERPL-SCNC: 140 MMOL/L (ref 135–145)
WBC # BLD AUTO: 6.7 10E3/UL (ref 4–11)

## 2025-05-03 PROCEDURE — 250N000013 HC RX MED GY IP 250 OP 250 PS 637: Performed by: STUDENT IN AN ORGANIZED HEALTH CARE EDUCATION/TRAINING PROGRAM

## 2025-05-03 PROCEDURE — 250N000013 HC RX MED GY IP 250 OP 250 PS 637: Performed by: HOSPITALIST

## 2025-05-03 PROCEDURE — 85018 HEMOGLOBIN: CPT | Performed by: INTERNAL MEDICINE

## 2025-05-03 PROCEDURE — 80048 BASIC METABOLIC PNL TOTAL CA: CPT | Performed by: INTERNAL MEDICINE

## 2025-05-03 PROCEDURE — 36415 COLL VENOUS BLD VENIPUNCTURE: CPT | Performed by: INTERNAL MEDICINE

## 2025-05-03 RX ADMIN — SENNOSIDES AND DOCUSATE SODIUM 1 TABLET: 50; 8.6 TABLET ORAL at 08:44

## 2025-05-03 RX ADMIN — ACETAMINOPHEN 975 MG: 325 TABLET ORAL at 08:44

## 2025-05-03 RX ADMIN — PANTOPRAZOLE SODIUM 40 MG: 40 TABLET, DELAYED RELEASE ORAL at 08:44

## 2025-05-03 RX ADMIN — ASPIRIN 81 MG: 81 TABLET, COATED ORAL at 08:44

## 2025-05-03 RX ADMIN — POLYETHYLENE GLYCOL 3350 17 G: 17 POWDER, FOR SOLUTION ORAL at 08:44

## 2025-05-03 ASSESSMENT — ACTIVITIES OF DAILY LIVING (ADL)
ADLS_ACUITY_SCORE: 40
ADLS_ACUITY_SCORE: 37
ADLS_ACUITY_SCORE: 40
ADLS_ACUITY_SCORE: 37
ADLS_ACUITY_SCORE: 40
ADLS_ACUITY_SCORE: 37

## 2025-05-03 NOTE — PLAN OF CARE
Occupational Therapy Discharge Summary    Reason for therapy discharge:    Discharged to transitional care facility.    Progress towards therapy goal(s). See goals on Care Plan in [facility] electronic health record for goal details.  Goals not met.  Barriers to achieving goals:   discharge from facility.    Therapy recommendation(s):    Continued therapy is recommended.  Rationale/Recommendations:  Pt is going to TCU for further therapy.

## 2025-05-03 NOTE — PLAN OF CARE
"Goal Outcome Evaluation:          Problem: Adult Inpatient Plan of Care  Goal: Plan of Care Review  Description: The Plan of Care Review/Shift note should be completed every shift.  The Outcome Evaluation is a brief statement about your assessment that the patient is improving, declining, or no change.  This information will be displayed automatically on your shiftnote.  Outcome: Progressing  Goal: Patient-Specific Goal (Individualized)  Description: You can add care plan individualizations to a care plan. Examples of Individualization might be:  \"Parent requests to be called daily at 9am for status\", \"I have a hard time hearing out of my right ear\", or \"Do not touch me to wake me up as it startlesme\".  Outcome: Progressing  Goal: Absence of Hospital-Acquired Illness or Injury  Outcome: Progressing  Intervention: Identify and Manage Fall Risk  Recent Flowsheet Documentation  Taken 5/2/2025 2345 by Bonnie Stephens RN  Safety Promotion/Fall Prevention:   activity supervised   lighting adjusted   mobility aid in reach   nonskid shoes/slippers when out of bed   room near nurse's station   room organization consistent  Intervention: Prevent Skin Injury  Recent Flowsheet Documentation  Taken 5/3/2025 0426 by Bonnie Stephens RN  Body Position: position changed independently  Taken 5/2/2025 2345 by Bonnie Stephens RN  Body Position: supine, head elevated  Skin Protection: adhesive use limited  Intervention: Prevent and Manage VTE (Venous Thromboembolism) Risk  Recent Flowsheet Documentation  Taken 5/2/2025 2345 by Bonnie Stephens RN  VTE Prevention/Management: SCDs off (sequential compression devices)  Intervention: Prevent Infection  Recent Flowsheet Documentation  Taken 5/2/2025 2345 by Bonnie Stephens RN  Infection Prevention: hand hygiene promoted  Goal: Optimal Comfort and Wellbeing  Outcome: Progressing  Intervention: Monitor Pain and Promote Comfort  Recent Flowsheet Documentation  Taken " 5/2/2025 2345 by Bonnie Stephens RN  Pain Management Interventions:   medication (see MAR)   cold applied   food  Intervention: Provide Person-Centered Care  Recent Flowsheet Documentation  Taken 5/2/2025 2345 by Bonnie Stephens RN  Trust Relationship/Rapport:   care explained   choices provided  Goal: Readiness for Transition of Care  Outcome: Progressing     Problem: Delirium  Goal: Optimal Coping  Outcome: Progressing  Intervention: Optimize Psychosocial Adjustment to Delirium  Recent Flowsheet Documentation  Taken 5/2/2025 2345 by Bonnie Stephens RN  Family/Support System Care: self-care encouraged  Goal: Improved Behavioral Control  Outcome: Progressing  Intervention: Prevent and Manage Agitation  Recent Flowsheet Documentation  Taken 5/2/2025 2345 by Bonnie Stephens RN  Environment Familiarity/Consistency: daily routine followed  Intervention: Minimize Safety Risk  Recent Flowsheet Documentation  Taken 5/2/2025 2345 by Bonnie Stephens RN  Enhanced Safety Measures:   patient/family teach back on injury risk   pain management   Pre/Post Op education on fall prevention   review medications for side effects with activity  Trust Relationship/Rapport:   care explained   choices provided  Goal: Improved Attention and Thought Clarity  Outcome: Progressing  Intervention: Maximize Cognitive Function  Recent Flowsheet Documentation  Taken 5/2/2025 2345 by Bonnie Stephens RN  Sensory Stimulation Regulation: television on  Reorientation Measures:   calendar in view   clock in view  Goal: Improved Sleep  Outcome: Progressing     Problem: Hip Fracture Surgical Repair  Goal: Optimal Coping with Change in Health Status  Outcome: Progressing  Intervention: Support Psychosocial Response to Surgery and Mobility Changes  Recent Flowsheet Documentation  Taken 5/2/2025 2345 by Bonnie Stephens RN  Family/Support System Care: self-care encouraged  Goal: Absence of Bleeding  Outcome:  Progressing  Intervention: Monitor and Manage Bleeding  Recent Flowsheet Documentation  Taken 5/2/2025 2345 by Bonnie Stephens RN  Bleeding Management: prepared for surgical intervention  Goal: Effective Bowel Elimination  Outcome: Progressing  Intervention: Enhance Bowel Motility and Elimination  Recent Flowsheet Documentation  Taken 5/2/2025 2345 by Bonnie Stephens RN  Bowel Elimination Promotion: adequate fluid intake promoted  Goal: Cognitive Function Maintained  Outcome: Progressing  Intervention: Maintain Cognitive Function  Recent Flowsheet Documentation  Taken 5/2/2025 2345 by Bonnie Stephens RN  Reorientation Measures:   calendar in view   clock in view  Environment Familiarity/Consistency: daily routine followed  Goal: Fluid and Electrolyte Balance  Outcome: Progressing  Goal: Absence of Infection Signs and Symptoms  Outcome: Progressing  Goal: Optimal Functional Ability  Outcome: Progressing  Intervention: Promote Optimal Functional Status  Recent Flowsheet Documentation  Taken 5/2/2025 2345 by Bonnie Stephens RN  Activity Management: activity adjusted per tolerance  Goal: Anesthesia/Sedation Recovery  Outcome: Progressing  Intervention: Optimize Anesthesia Recovery  Recent Flowsheet Documentation  Taken 5/2/2025 2345 by Bonnie Stephens RN  Safety Promotion/Fall Prevention:   activity supervised   lighting adjusted   mobility aid in reach   nonskid shoes/slippers when out of bed   room near nurse's station   room organization consistent  Reorientation Measures:   calendar in view   clock in view  Goal: Optimal Pain Control and Function  Outcome: Progressing  Intervention: Prevent or Manage Pain  Recent Flowsheet Documentation  Taken 5/2/2025 2345 by Bonnie Stephens RN  Pain Management Interventions:   medication (see MAR)   cold applied   food  Diversional Activities: television  Goal: Nausea and Vomiting Relief  Outcome: Progressing  Goal: Effective Urinary  Elimination  Outcome: Progressing  Goal: Effective Oxygenation and Ventilation  Outcome: Progressing  Intervention: Optimize Oxygenation and Ventilation  Recent Flowsheet Documentation  Taken 5/3/2025 0426 by Bonnie Stephens RN  Head of Bed ([facility]) Positioning: HOB at 20 degrees  Taken 5/2/2025 2345 by Bonnie Stephens RN  Head of Bed ([facility]) Positioning: HOB at 20 degrees     Problem: Chronic Kidney Disease  Goal: Optimal Coping with Chronic Illness  Outcome: Progressing  Intervention: Support Psychosocial Response  Recent Flowsheet Documentation  Taken 5/2/2025 2345 by Bonnie Stephens RN  Family/Support System Care: self-care encouraged  Goal: Electrolyte Balance  Outcome: Progressing  Goal: Fluid Balance  Outcome: Progressing  Intervention: Monitor and Manage Hypervolemia  Recent Flowsheet Documentation  Taken 5/2/2025 2345 by Bonnie Stephens RN  Skin Protection: adhesive use limited  Goal: Optimal Functional Ability  Outcome: Progressing  Intervention: Optimize Functional Ability  Recent Flowsheet Documentation  Taken 5/2/2025 2345 by Bonnie Stephens RN  Activity Management: activity adjusted per tolerance  Environment Familiarity/Consistency: daily routine followed  Goal: Absence of Anemia Signs and Symptoms  Outcome: Progressing  Intervention: Manage Signs of Anemia and Bleeding  Recent Flowsheet Documentation  Taken 5/2/2025 2345 by Bonnie Stephens RN  Bleeding Precautions: monitored for signs of bleeding  Environmental Support: calm environment promoted  Goal: Optimal Oral Intake  Outcome: Progressing  Goal: Acceptable Pain Control  Outcome: Progressing  Intervention: Prevent or Manage Pain  Recent Flowsheet Documentation  Taken 5/2/2025 2345 by Bonnie Stephens RN  Pain Management Interventions:   medication (see MAR)   cold applied   food  Goal: Minimize Renal Failure Effects  Outcome: Progressing  Intervention: Monitor and Support Renal Function  Recent Flowsheet  Documentation  Taken 5/2/2025 2345 by Bonnie Stephens RN  Medication Review/Management: medications reviewed     Problem: Comorbidity Management  Goal: Maintenance of Behavioral Health Symptom Control  Outcome: Progressing  Intervention: Maintain Behavioral Health Symptom Control  Recent Flowsheet Documentation  Taken 5/2/2025 2345 by Bonnie Stephens RN  Medication Review/Management: medications reviewed  Goal: Blood Pressure in Desired Range  Outcome: Progressing  Intervention: Maintain Blood Pressure Management  Recent Flowsheet Documentation  Taken 5/2/2025 2345 by Bonnie Stephens RN  Medication Review/Management: medications reviewed     Problem: Mobility Impairment  Goal: Optimal Mobility  Outcome: Progressing  Intervention: Optimize Mobility  Recent Flowsheet Documentation  Taken 5/3/2025 0426 by Bonnie Stephens RN  Positioning/Transfer Devices:   pillows   in use  Taken 5/2/2025 2345 by Bonnie Stephens RN  Assistive Device Utilized: walker  Activity Management: activity adjusted per tolerance  Positioning/Transfer Devices:   pillows   in use     Problem: Fall Injury Risk  Goal: Absence of Fall and Fall-Related Injury  Outcome: Progressing  Intervention: Identify and Manage Contributors  Recent Flowsheet Documentation  Taken 5/2/2025 2345 by Bonnie Stephens RN  Medication Review/Management: medications reviewed  Intervention: Promote Injury-Free Environment  Recent Flowsheet Documentation  Taken 5/2/2025 2345 by Bonnie Stephens RN  Safety Promotion/Fall Prevention:   activity supervised   lighting adjusted   mobility aid in reach   nonskid shoes/slippers when out of bed   room near nurse's station   room organization consistent     Problem: Pain Acute  Goal: Optimal Pain Control and Function  Outcome: Progressing  Intervention: Optimize Psychosocial Wellbeing  Recent Flowsheet Documentation  Taken 5/2/2025 2345 by Bonnie Stephens RN  Diversional Activities:  television  Intervention: Develop Pain Management Plan  Recent Flowsheet Documentation  Taken 5/2/2025 2345 by Bonnie Stephens, RN  Pain Management Interventions:   medication (see MAR)   cold applied   food  Intervention: Prevent or Manage Pain  Recent Flowsheet Documentation  Taken 5/2/2025 2345 by Bonnie Stephens, RN  Sensory Stimulation Regulation: television on  Bowel Elimination Promotion: adequate fluid intake promoted  Medication Review/Management: medications reviewed          Patient A&O X 4, denied pain,  no syncope occur this shift, on RA.   Patient is weight bearing as tolerated, up to the bathroom with assist of one with walker. NS infusing 50 ml/hr.  Care needs known. Care plan ongoing.    Bonnie PHELAN RN.

## 2025-05-03 NOTE — PROGRESS NOTES
"Orthopedic Progress Note      Assessment: 4 Days Post-Op  S/P Procedure(s):  INTERNAL FIXATION, FRACTURE, TROCHANTERIC, HIP, USING PINS OR RODS     Plan:   Activity: Up with assist and assistive device (walker) at all times.  Weight bearing status: WBAT RLE.  Pain management: Continue current regimen  Diet: Begin with clear fluids and progress diet as tolerated.   DVT prophylaxis: ASA 81 mg BID x 4 weeks to begin on POD#1 and mechanical while in the hospital  Labs: Hgb 9.3 transfuse for Hbg <7.0   Dressings: Keep clean, dry and intact until follow up.   Elevation: Elevate RLE on pillows to keep above the level of the heart as much as possible.   Physical Therapy/Occupational Therapy: Eval and treat, appreciate assistance and recommendations.  Follow-up: Please follow-up with Dr. Oswald's team in 2 weeks for incision check, suture removal, and repeat x-rays needed at [facility]. Call our scheduling line at [phone] to make an appointment.  -- Recommend outpatient bone health evaluation including DEXA scan, with medical management as indicated. Tentative plan to coordinate follow with Dr. Castillo at [facility]. Vitamin D and Ca supplementation for the time being pending work up.   Disposition: Pending medical stability and TCU placement.  Orthopedically stable for discharge    Subjective:  Lamin reports overall doing well today. He reports pain is adequately controlled today. Has been ambulating with walker during therapy. Denies any persistent nausea or lightheadedness. Reports ongoing weakness within the right thigh, but denies any numbness or tingling.  Denies fever/chills. Planning for discharge later today to TCU, feeling comfortable with the plan.       Objective:  /67 (BP Location: Right arm)   Pulse 96   Temp 98.7  F (37.1  C) (Oral)   Resp 16   Ht 1.753 m (5' 9\")   Wt 64.4 kg (142 lb)   SpO2 94%   BMI 20.97 kg/m      The patient is A&Ox3. Appears comfortable, sitting up in " chair  Calves without tenderness, neg Lelo's  Brisk capillary refill in the toes.   Palpable Right dorsalis pedis pulse. Right foot warm & well-perfused.  Sensation is intact to light touch & equal bilaterally in the femoral, DP, SP & tibial nerve distributions.  ROM: Appropriately flexes & extends all toes bilaterally.   Motor: +5/5 dorsiflexion, plantar flexion & EHL bilaterally.   Leg lengths equal.  Dressing C/D/I without strikethrough, no surrounding erythema.      Pertinent Labs   Lab Results: personally reviewed.   Lab Results   Component Value Date    INR 1.03 04/29/2025    PROTIME 13.8 04/29/2025     Lab Results   Component Value Date    WBC 6.7 05/03/2025    HGB 9.3 (L) 05/03/2025    HCT 27.6 (L) 05/03/2025    MCV 93 05/03/2025     05/03/2025     Lab Results   Component Value Date     05/03/2025    CO2 25 05/03/2025       Vernon Oswald MD   Orthopedic Surgery   [facility]

## 2025-05-03 NOTE — PLAN OF CARE
Physical Therapy Discharge Summary    Reason for therapy discharge:    Discharged to new facility     Progress towards therapy goal(s). See goals on Care Plan in [facility] electronic health record for goal details.  Goals partially met.  Barriers to achieving goals:   discharge from facility.    Therapy recommendation(s):    No further therapy is recommended.    Bert Rosa, PT

## 2025-05-03 NOTE — PLAN OF CARE
Problem: Pain Acute  Goal: Optimal Pain Control and Function  Outcome: Progressing  Intervention: Optimize Psychosocial Wellbeing  Recent Flowsheet Documentation  Taken 5/2/2025 1800 by Nav Monk, RN  Diversional Activities: television  Intervention: Prevent or Manage Pain  Recent Flowsheet Documentation  Taken 5/2/2025 1800 by Nav Monk, RN  Sensory Stimulation Regulation: television on     Problem: Mobility Impairment  Goal: Optimal Mobility  Outcome: Progressing   Goal Outcome Evaluation:               Patient  A and O times 4. Stand by assist with walker to and from bathroom. VSS this shift. Patient running 50 ml/hr NS ordered this afternoon. Patient verbalized no pain this shift. No syncope this shift. Possible discharge tomorrow.

## 2025-05-03 NOTE — DISCHARGE SUMMARY
"[facility]  Hospitalist Discharge Summary      Date of Admission:  4/29/2025  Date of Discharge:  5/3/2025  Discharging Provider: Doreen Russell MD  Discharge Service: Hospitalist Service    Discharge Diagnoses   ***    Clinically Significant Risk Factors          Follow-ups Needed After Discharge   Follow-up Appointments       Follow Up Care      Please follow-up with Dr. Oswald/Lizzie Ba PA-C in 2 weeks   at [facility]. Call our scheduling line at [phone] to make an appointment if you do not already have one scheduled.  Please follow-up with Dr. Castillo's team at [facility] for a bone health evaluation. Call our scheduling line at [phone] to make an appointment, if you do not already have one scheduled.        Follow Up and recommended labs and tests      Follow up with halfway physician.  The following labs/tests are recommended: CBC and BMP in 2 days.            {Additional follow-up instructions/to-do's for PCP    :***}    Unresulted Labs Ordered in the Past 30 Days of this Admission       No orders found from 3/30/2025 to 4/30/2025.        These results will be followed up by ***    Discharge Disposition   {Discharge to:0800680::\"Discharged to home\"}  Condition at discharge: {CONDITION:184784::\"Stable\"}    Hospital Course   {OPTIONAL -- use to select A&P section   :682047}    Consultations This Hospital Stay   PHYSICAL THERAPY ADULT IP CONSULT  OCCUPATIONAL THERAPY ADULT IP CONSULT  CARE MANAGEMENT / SOCIAL WORK IP CONSULT  PHYSICAL THERAPY ADULT IP CONSULT  OCCUPATIONAL THERAPY ADULT IP CONSULT  PHYSICAL THERAPY ADULT IP CONSULT  OCCUPATIONAL THERAPY ADULT IP CONSULT  CARE MANAGEMENT / SOCIAL WORK IP CONSULT  PHYSICAL THERAPY ADULT IP CONSULT  OCCUPATIONAL THERAPY ADULT IP CONSULT    Code Status   Full Code    Time Spent on this Encounter   {How much time did you spend on discharge?:49536709}       Doreen Russell MD  [facility] " "[address] [address]  Phone: [phone]  Fax: [phone]  ______________________________________________________________________    Physical Exam   Vital Signs: Temp: 98.7  F (37.1  C) Temp src: Oral BP: 132/67 Pulse: 96   Resp: 16 SpO2: 94 % O2 Device: None (Room air)    Weight: 142 lbs 0 oz  {Recommend personal SmartPhrase or Notewriter for exam (OPTIONAL)   :564706}       Primary Care Physician   Antoinette Bagley    Discharge Orders      DX Bone Density     General info for SNF    Length of Stay Estimate: Short Term Care: Estimated # of Days <30 Condition at Discharge: Stable Level of care:skilled  Rehabilitation Potential: Good Admission H&P remains valid and up-to-date: Yes Recent Chemotherapy: N/A Use Nursing Home Standing Orders: N/A     Mantoux Instructions    Give two-step Mantoux (PPD) Per Facility Policy {.:342562     Incentive Spirometry    Incentive Spirometry 10 times per hour, 4 times per day.     Reason for your hospital stay    HIP FRACTURE     When to call - Contact Surgeon Team    You may experience symptoms that require follow-up before your scheduled appointment. Refer to the \"Stoplight Tool\" for instructions on when to contact your Surgeon Team if you are concerned about pain control, blood clots, constipation, or if you are unable to urinate.     When to call - Reach out to Urgent Care    If you are not able to reach your Surgeon Team and you need immediate care, go to the Orthopedic Walk-in Clinic or Urgent Care at your Surgeon's office.  Do NOT go to the Emergency Room unless you have shortness of breath, chest pain, or other signs of a medical emergency.     When to call - Reasons to Call 911    Call 911 immediately if you experience sudden-onset chest pain, arm weakness/numbness, slurred speech, or shortness of breath     Symptoms - Fever Management    A low grade fever can be expected after surgery.  Use acetaminophen " (TYLENOL) as needed for fever management.  Contact your Surgeon Team if you have a fever greater than 101.5 F, chills, and/or night sweats.     Symptoms - Constipation management    Constipation (hard, dry bowel movements) is expected after surgery due to the combination of being less active, the anesthetic, and the opioid pain medication.  You can do the following to help reduce constipation:  ~  FLUIDS:  Drink clear liquids (water or Gatorade), or juice (apple/prune).  ~  DIET:  Eat a fiber rich diet.    ~  ACTIVITY:  Get up and move around several times a day.  Increase your activity as you are able.  MEDICATIONS:  Reduce the risk of constipation by starting medications before you are constipated.  You can take Miralax   (1 packet as directed) and/or a stool softener (Senokot 1-2 tablets 1-2 times a day).  If you already have constipation and these medications are not working, you can get magnesium citrate and use as directed.  If you continue to have constipation you can try an over the counter suppository or enema.  Call your Surgeon Team if it has been greater than 3 days since your last bowel movement.     Symptoms - Reduced Urine Output    Changes in the amount of fluids you drank before and after surgery may result in problems urinating.  It is important to stay well-hydrated after surgery and drink plenty of water. If it has been greater than 8 hours since you have urinated despite drinking plenty of water, call your Surgeon Team.     Activity - Exercises to prevent blood clots    Unless otherwise directed by your Surgeon team, perform the following exercises at least three times per day for the first four weeks after surgery to prevent blood clots in your legs: 1) Point and flex your feet (Ankle Pumps), 2) Move your ankle around in big circles, 3) Wiggle your toes, 4) Walk, even for short distances, several times a day, will help decrease the risk of blood clots.     Comfort and Pain Management - Pain  after Surgery    Pain after surgery is normal and expected.  You will have some amount of pain for several weeks after surgery.  Your pain will improve with time.  There are several things you can do to help reduce your pain including: rest, ice, elevation, and using pain medications as needed. Contact your Surgeon Team if you have pain that persists or worsens after surgery despite rest, ice, elevation, and taking your medication(s) as prescribed. Contact your Surgeon Team if you have new numbness, tingling, or weakness in your operative extremity.     Comfort and Pain Management - Swelling after Surgery    Swelling and/or bruising of the surgical extremity is common and may persist for several months after surgery. In addition to frequent icing and elevation, gentle compressive support with an ACE wrap or tubigrip may help with swelling. Apply compression regularly, removing at least twice daily to perform skin checks. Contact your Surgeon Team if your swelling increases and is NOT associated with an increase in your activity level, or if your swelling increases and is associated with redness and pain.     Comfort and Pain Management - Cold therapy    Ice can be used to control swelling and discomfort after surgery. Place a thin towel over your operative site and apply the ice pack overtop. Leave ice pack in place for 20 minutes, then remove for 20 minutes. Repeat this 20 minutes on/20 minutes off routine as often as tolerated.     Medication Instructions - Acetaminophen (TYLENOL) Instructions    You were discharged with acetaminophen (TYLENOL) for pain management after surgery. Acetaminophen most effectively manages pain symptoms when it is taken on a schedule without missing doses (every four, six, or eight hours). Your Provider will prescribe a safe daily dose between 3000 - 4000 mg.  Do NOT exceed this daily dose. Most patients use acetaminophen for pain control for the first four weeks after surgery.  You can  "wean from this medication as your pain decreases.     Shower with wound/dressing covered    You must COVER your dressing or incision with saran wrap (or any other non-permeable covering) to allow the incision to remain dry while showering.  You may shower 3 days after surgery as long as the surgical wound stays dry. Continue to cover your dressing or incision for showering until your first office visit.  You are strictly prohibited from soaking   or submerging the surgical wound underwater.     Medication instructions -  Anticoagulation - aspirin    Take the aspirin as prescribed for a total of four weeks after surgery.  This is given to help minimize your risk of blood clot.     Comfort and Pain Management - LOWER Extremity Elevation    Swelling is expected for several months after surgery. This type of swelling is usually associated with gravity and activity, and can be improved with elevation.   The best way to do this is to get your \"toes above your nose\" by laying down and placing several pillows lengthwise under your calf and heel. When elevating your leg keep your knee completely straight. Perform this elevation as often as possible especially for the first two weeks after surgery.     Opioid Instructions (Greater than or equal to 65 years)    You were discharged with an opioid medication (hydromorphone, oxycodone, hydrocodone, or tramadol). This medication should only be taken for breakthrough pain that is not controlled with acetaminophen (TYLENOL). If you rate your pain less than 3 you do not need this medication. Pain rating 0-3: You do not need this medication Pain rating 4-6: Take 1/2 tablet every 4-6 hours as needed Pain rating 7-10: Take 1 tablet every 4-6 hours as needed Do not exceed 4 tablets per day     Medication Instructions - Opioids - Tapering Instructions    In the first three days following surgery, your symptoms may warrant use of the narcotic pain medication every four to six hours as " prescribed. This is normal. As your pain symptoms improve, focus your efforts on decreasing (tapering) use of narcotic medications. The most successful tapering strategy is to first, decrease the number of tablets you take every 4-6 hours to the minimum prescribed. Then, increase the amount of time between doses. For example: First, taper to   or 1 tablet every 4-6 hours. Then, taper to   or 1 tablet every 6-8 hours. Then, taper to   or 1 tablet every 8-10 hours. Then, taper to   or 1 tablet every 10-12 hours. Then, taper to   or 1 tablet at bedtime. The bedtime dose can help with comfort during sleep and is typically the last dose to be discontinued after surgery.     Follow Up Care    Please follow-up with Dr. Oswald/Lizzie Ba PA-C in 2 weeks   at [facility]. Call our scheduling line at [phone] to make an appointment if you do not already have one scheduled.  Please follow-up with Dr. Castillo's team at [facility] for a bone health evaluation. Call our scheduling line at [phone] to make an appointment, if you do not already have one scheduled.     General info for SNF    Length of Stay Estimate: Short Term Care: Estimated # of Days <30  Condition at Discharge: Stable  Level of care:skilled   Rehabilitation Potential: Fair  Admission H&P remains valid and up-to-date: Yes  Recent Chemotherapy: N/A  Use Nursing Home Standing Orders: Yes     Mantoux instructions    Give two-step Mantoux (PPD) Per Facility Policy Yes     Follow Up and recommended labs and tests    Follow up with long term physician.  The following labs/tests are recommended: CBC and BMP in 2 days.     Reason for your hospital stay    Right hip fracture     Activity - Up with assistive device     Activity - Up with nursing assistance     Physical Therapy Adult Consult    Evaluate and treat as clinically indicated.    Reason: Status Post Hip Surgery     Occupational Therapy Adult Consult    Evaluate and treat as  clinically indicated.    Reason: Status Post Hip Surgery     Physical Therapy Adult Consult    Evaluate and treat as clinically indicated.    Reason:  gait training     Occupational Therapy Adult Consult    Evaluate and treat as clinically indicated.    Reason:  ALDs     Fall precautions     Hip precautions     Crutches DME    DME Documentation: Describe the reason for need to support medical necessity: Impaired gait status post hip surgery. I, the undersigned, certify that the above prescribed supplies are medically necessary for this patient and is both reasonable and necessary in reference to accepted standards of medical practice in the treatment of this patient's condition and is not prescribed as a convenience.     Cane DME    DME Documentation: Describe the reason for need to support medical necessity: Impaired gait status post hip surgery. I, the undersigned, certify that the above prescribed supplies are medically necessary for this patient and is both reasonable and necessary in reference to accepted standards of medical practice in the treatment of this patient's condition and is not prescribed as a convenience.     Walker DME    DME Documentation: Describe the reason for need to support medical necessity: Impaired gait status post hip surgery. I, the undersigned, certify that the above prescribed supplies are medically necessary for this patient and is both reasonable and necessary in reference to accepted standards of medical practice in the treatment of this patient's condition and is not prescribed as a convenience.     Diet    Follow this diet upon discharge: Current Diet:None     Diet    Follow this diet upon discharge: Regular Diet Adult       Significant Results and Procedures   {Data for Discharge Summary:741120}    Discharge Medications   Current Discharge Medication List        START taking these medications    Details   acetaminophen (TYLENOL) 325 MG tablet Take 3 tablets (975 mg) by mouth  every 8 hours.  Qty: 100 tablet, Refills: 0    Associated Diagnoses: Hip fracture, right, closed, initial encounter (H)      aspirin (ASA) 81 MG EC tablet Take 1 tablet (81 mg) by mouth 2 times daily.  Qty: 60 tablet, Refills: 0    Associated Diagnoses: Hip fracture, right, closed, initial encounter (H)      oxyCODONE (ROXICODONE) 5 MG tablet Take 0.5 tablets (2.5 mg) by mouth every 4 hours as needed for severe pain.  Qty: 10 tablet, Refills: 0    Associated Diagnoses: Hip fracture, right, closed, initial encounter (H)      senna-docusate (SENOKOT-S/PERICOLACE) 8.6-50 MG tablet Take 2 tablets by mouth 2 times daily as needed for constipation.  Qty: 30 tablet, Refills: 0    Associated Diagnoses: Hip fracture, right, closed, initial encounter (H)           CONTINUE these medications which have NOT CHANGED    Details   amitriptyline (ELAVIL) 25 MG tablet Take 1 tablet (25 mg) by mouth at bedtime.  Qty: 90 tablet, Refills: 3    Associated Diagnoses: Moderate single current episode of major depressive disorder (H)      fish oil-omega-3 fatty acids 1000 MG capsule Take 1 g by mouth every morning.      glucosamine-chondroitin 500-400 MG CAPS per capsule Take 1 capsule by mouth every morning.      multivitamin w/minerals (THERA-VIT-M) tablet Take 1 tablet by mouth every morning.      omeprazole (PRILOSEC) 20 MG DR capsule Take 20 mg by mouth every morning.      Turmeric 400 MG CAPS Take 1 capsule by mouth every morning.           Allergies   Allergies   Allergen Reactions    Sulfa Antibiotics Rash

## 2025-05-03 NOTE — PROGRESS NOTES
Care Management Discharge Note    Discharge Date: 05/03/2025       Discharge Disposition:  Transitional Care Unit     Discharge Services:  TCU     Discharge DME:      Discharge Transportation: family or friend will provide    Private pay costs discussed: Not applicable    Does the patient's insurance plan have a 3 day qualifying hospital stay waiver?  No    PAS Confirmation Code: [id number]  Patient/family educated on Medicare website which has current facility and service quality ratings:  yes    Education Provided on the Discharge Plan:  yes  Persons Notified of Discharge Plans: yes  Patient/Family in Agreement with the Plan:  yes    Handoff Referral Completed: No, handoff not indicated or clinically appropriate    Additional Information:  Pt discharging to [facility] TCU via [facility]. [employer] ride set for 1300 with a  window between [phone] (agreeable to private pay). All parties aware and agreeable to discharge plan. PAS done.  8:30 AM    Brenna Kjellberg, BSW

## 2025-05-05 ENCOUNTER — DOCUMENTATION ONLY (OUTPATIENT)
Dept: GERIATRICS | Facility: CLINIC | Age: OVER 89
End: 2025-05-05
Payer: MEDICARE

## 2025-05-06 ENCOUNTER — TRANSITIONAL CARE UNIT VISIT (OUTPATIENT)
Dept: GERIATRICS | Facility: CLINIC | Age: OVER 89
End: 2025-05-06
Payer: MEDICARE

## 2025-05-06 VITALS
TEMPERATURE: 97.9 F | HEART RATE: 99 BPM | BODY MASS INDEX: 21.27 KG/M2 | SYSTOLIC BLOOD PRESSURE: 109 MMHG | WEIGHT: 144 LBS | OXYGEN SATURATION: 94 % | RESPIRATION RATE: 17 BRPM | DIASTOLIC BLOOD PRESSURE: 50 MMHG

## 2025-05-06 DIAGNOSIS — I10 HYPERTENSION, UNSPECIFIED TYPE: ICD-10-CM

## 2025-05-06 DIAGNOSIS — N18.31 STAGE 3A CHRONIC KIDNEY DISEASE (H): ICD-10-CM

## 2025-05-06 DIAGNOSIS — S72.001D CLOSED RIGHT HIP FRACTURE, WITH ROUTINE HEALING, SUBSEQUENT ENCOUNTER: Primary | ICD-10-CM
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
## 2025-05-06 DIAGNOSIS — Z87.81 S/P ORIF (OPEN REDUCTION INTERNAL FIXATION) FRACTURE: ICD-10-CM

## 2025-05-06 DIAGNOSIS — Z98.890 S/P ORIF (OPEN REDUCTION INTERNAL FIXATION) FRACTURE: ICD-10-CM

## 2025-05-06 DIAGNOSIS — R55 SYNCOPE, UNSPECIFIED SYNCOPE TYPE: ICD-10-CM

## 2025-05-06 DIAGNOSIS — S72.001A HIP FRACTURE, RIGHT, CLOSED, INITIAL ENCOUNTER (H): ICD-10-CM

## 2025-05-06 PROCEDURE — 99309 SBSQ NF CARE MODERATE MDM 30: CPT | Performed by: NURSE PRACTITIONER

## 2025-05-06 RX ORDER — OXYCODONE HYDROCHLORIDE 5 MG/1
2.5 TABLET ORAL EVERY 4 HOURS PRN
Qty: 30 TABLET | Refills: 0 | OUTPATIENT
Start: 2025-05-06

## 2025-05-06 NOTE — LETTER
5/6/2025      Carlos Faith  [address]  [place] [address]        [facility] GERIATRICS    PRIMARY CARE PROVIDER AND CLINIC:  Antoinette Bagley DO, [address] / [facility] [address]  Chief Complaint   Patient presents with     Hospital F/U      [place] Medical Record Number:  [id number]  Place of Service where encounter took place:  [facility]  AT [facility] ([facility]) [[id number]]    Carlos Faith  is a 89 year old  (1935), admitted to the above facility from  [facility]. Hospital stay 4/29/2025 through 5/3/2025..   HPI:      Please follow-up with Dr. Oswald/Lizzie Ba PA-C in 2 weeks   at [facility]. Call our scheduling line at [phone] to make an appointment if you do not already have one scheduled.  Please follow-up with Dr. Castillo's team at [facility] for a bone health evaluation. Call our scheduling line at [phone] to make an appointment, if you do not already have one scheduled.       Patient seen today for follow up, family also present, appears healthy, oriented, states wanting to return home versus stay in TCU for rehab, is ambulatory, pain controlled, incision dressing CDI, scant edema, OK for patient to leave with support and home care.    CODE STATUS/ADVANCE DIRECTIVES DISCUSSION:  Full Code     On file  ALLERGIES:   Allergies   Allergen Reactions     Sulfa Antibiotics Rash      PAST MEDICAL HISTORY:   Past Medical History:   Diagnosis Date     Anxiety      Duodenal ulcer      GERD without esophagitis      Hyperlipidemia      Prostate cancer (H) 1994      PAST SURGICAL HISTORY:   has a past surgical history that includes Prostate surgery (1994);  penile implant (1994); hernia repair, inguinal rt/lt; Cystoscopy, dilate urethra, combined (N/A, 6/1/2017); cataract iol, rt/lt; and Internal Fixation, Femur, Neck, Using Plate And Screws, With [place] Table (Right, 4/29/2025).  FAMILY HISTORY: family  history includes Coronary Artery Disease in his father; Prostate Cancer in his paternal grandfather.  SOCIAL HISTORY:   reports that he has never smoked. He has never used smokeless tobacco. He reports current alcohol use. He reports that he does not use drugs.  Patient's living condition: lives with spouse    Post Discharge Medication Reconciliation Status:   MED REC REQUIRED  Post Medication Reconciliation Status: discharge medications reconciled, continue medications without change       Current Outpatient Medications   Medication Sig Dispense Refill     acetaminophen (TYLENOL) 325 MG tablet Take 3 tablets (975 mg) by mouth every 8 hours. 100 tablet 0     amitriptyline (ELAVIL) 25 MG tablet Take 1 tablet (25 mg) by mouth at bedtime. 90 tablet 3     aspirin (ASA) 81 MG EC tablet Take 1 tablet (81 mg) by mouth 2 times daily. 60 tablet 0     fish oil-omega-3 fatty acids 1000 MG capsule Take 1 g by mouth every morning.       glucosamine-chondroitin 500-400 MG CAPS per capsule Take 1 capsule by mouth every morning.       multivitamin w/minerals (THERA-VIT-M) tablet Take 1 tablet by mouth every morning.       omeprazole (PRILOSEC) 20 MG DR capsule Take 20 mg by mouth every morning.       oxyCODONE (ROXICODONE) 5 MG tablet Take 0.5 tablets (2.5 mg) by mouth every 4 hours as needed for severe pain. 10 tablet 0     senna-docusate (SENOKOT-S/PERICOLACE) 8.6-50 MG tablet Take 2 tablets by mouth 2 times daily as needed for constipation. 30 tablet 0     Turmeric 400 MG CAPS Take 1 capsule by mouth every morning.       No current facility-administered medications for this visit.       ROS:  4 point ROS including Respiratory, CV, GI and , other than that noted in the HPI,  is negative    Vitals:  /50   Pulse 99   Temp 97.9  F (36.6  C)   Resp 17   Wt 65.3 kg (144 lb)   SpO2 94%   BMI 21.27 kg/m    Exam:  GENERAL APPEARANCE:  in no distress, appears healthy  ENT:  Mouth and posterior oropharynx normal, moist mucous  membranes  RESP:  lungs clear to auscultation , no respiratory distress  CV:  no edema, rate-normal  ABDOMEN:  no guarding or rebound  M/S:   Gait and station abnormal unsteady  SKIN:  Inspection of skin and subcutaneous tissue baseline  NEURO:   Examination of sensation by touch normal  PSYCH:  affect and mood normal    Lab/Diagnostic data:  Recent labs in Psychiatric reviewed by me today.     ASSESSMENT/PLAN:    (S72.001D) Closed right hip fracture, with routine healing, subsequent encounter  (primary encounter diagnosis)  (W19.XXXD) Fall, subsequent encounter  (Z98.890,  Z87.81) S/P ORIF (open reduction internal fixation) fracture  Comment: pain controlled, dressing CDI  Plan:   -PT/OT eval and treat  -continue APAP TID, oxycodone PRN  -bowel regimen in place  -maintain dressing intact  -labs will be drawn if stays in TCU    (N18.31) Stage 3a chronic kidney disease (H)  Comment: creat 1.26  Plan:   -dose meds renally  -encourage fluids  -repeat BMP in 1-2 months    (I10) Hypertension, unspecified type  (R55) Syncope, unspecified syncope type  Comment: SBP's in the 110 range  Plan:   -daily vitals   -maintain hydration  -follow up orthostatics this week      Electronically signed by:  FLORENTINO Richmond CNP                    Sincerely,        FLORENTINO Richmond CNP    Electronically signed

## 2025-05-06 NOTE — PROGRESS NOTES
[facility] GERIATRICS    PRIMARY CARE PROVIDER AND CLINIC:  Antoinette Bagely DO, [address] [facility] / [facility] [address]  Chief Complaint   Patient presents with    Hospital F/U      [facility] Record Number:  [id number]  Place of Service where encounter took place:  [facility]  AT [facility] ([facility]) [[id number]]    Carlos Faith  is a 89 year old  (1935), admitted to the above facility from  [facility]. Hospital stay 4/29/2025 through 5/3/2025..   HPI:      Please follow-up with Dr. Oswald/Lizzie Ba PA-C in 2 weeks   at [facility]. Call our scheduling line at [phone] to make an appointment if you do not already have one scheduled.  Please follow-up with Dr. Castillo's team at [facility] for a bone health evaluation. Call our scheduling line at [phone] to make an appointment, if you do not already have one scheduled.       Patient seen today for follow up, family also present, appears healthy, oriented, states wanting to return home versus stay in TCU for rehab, is ambulatory, pain controlled, incision dressing CDI, scant edema, OK for patient to leave with support and home care.    CODE STATUS/ADVANCE DIRECTIVES DISCUSSION:  Full Code     On file  ALLERGIES:   Allergies   Allergen Reactions    Sulfa Antibiotics Rash      PAST MEDICAL HISTORY:   Past Medical History:   Diagnosis Date    Anxiety     Duodenal ulcer     GERD without esophagitis     Hyperlipidemia     Prostate cancer (H) 1994      PAST SURGICAL HISTORY:   has a past surgical history that includes Prostate surgery (1994);  penile implant (1994); hernia repair, inguinal rt/lt; Cystoscopy, dilate urethra, combined (N/A, 6/1/2017); cataract iol, rt/lt; and Internal Fixation, Femur, Neck, Using Plate And Screws, With [place] Table (Right, 4/29/2025).  FAMILY HISTORY: family history includes Coronary Artery Disease in his father; Prostate Cancer in his paternal  grandfather.  SOCIAL HISTORY:   reports that he has never smoked. He has never used smokeless tobacco. He reports current alcohol use. He reports that he does not use drugs.  Patient's living condition: lives with spouse    Post Discharge Medication Reconciliation Status:   MED REC REQUIRED  Post Medication Reconciliation Status: discharge medications reconciled, continue medications without change       Current Outpatient Medications   Medication Sig Dispense Refill    acetaminophen (TYLENOL) 325 MG tablet Take 3 tablets (975 mg) by mouth every 8 hours. 100 tablet 0    amitriptyline (ELAVIL) 25 MG tablet Take 1 tablet (25 mg) by mouth at bedtime. 90 tablet 3    aspirin (ASA) 81 MG EC tablet Take 1 tablet (81 mg) by mouth 2 times daily. 60 tablet 0    fish oil-omega-3 fatty acids 1000 MG capsule Take 1 g by mouth every morning.      glucosamine-chondroitin 500-400 MG CAPS per capsule Take 1 capsule by mouth every morning.      multivitamin w/minerals (THERA-VIT-M) tablet Take 1 tablet by mouth every morning.      omeprazole (PRILOSEC) 20 MG DR capsule Take 20 mg by mouth every morning.      oxyCODONE (ROXICODONE) 5 MG tablet Take 0.5 tablets (2.5 mg) by mouth every 4 hours as needed for severe pain. 10 tablet 0    senna-docusate (SENOKOT-S/PERICOLACE) 8.6-50 MG tablet Take 2 tablets by mouth 2 times daily as needed for constipation. 30 tablet 0    Turmeric 400 MG CAPS Take 1 capsule by mouth every morning.       No current facility-administered medications for this visit.       ROS:  4 point ROS including Respiratory, CV, GI and , other than that noted in the HPI,  is negative    Vitals:  /50   Pulse 99   Temp 97.9  F (36.6  C)   Resp 17   Wt 65.3 kg (144 lb)   SpO2 94%   BMI 21.27 kg/m    Exam:  GENERAL APPEARANCE:  in no distress, appears healthy  ENT:  Mouth and posterior oropharynx normal, moist mucous membranes  RESP:  lungs clear to auscultation , no respiratory distress  CV:  no edema,  rate-normal  ABDOMEN:  no guarding or rebound  M/S:   Gait and station abnormal unsteady  SKIN:  Inspection of skin and subcutaneous tissue baseline  NEURO:   Examination of sensation by touch normal  PSYCH:  affect and mood normal    Lab/Diagnostic data:  Recent labs in [facility] reviewed by me today.     ASSESSMENT/PLAN:    (S72.001D) Closed right hip fracture, with routine healing, subsequent encounter  (primary encounter diagnosis)  (W19.XXXD) Fall, subsequent encounter  (Z98.890,  Z87.81) S/P ORIF (open reduction internal fixation) fracture  Comment: pain controlled, dressing CDI  Plan:   -PT/OT eval and treat  -continue APAP TID, oxycodone PRN  -bowel regimen in place  -maintain dressing intact  -labs will be drawn if stays in TCU    (N18.31) Stage 3a chronic kidney disease (H)  Comment: creat 1.26  Plan:   -dose meds renally  -encourage fluids  -repeat BMP in 1-2 months    (I10) Hypertension, unspecified type  (R55) Syncope, unspecified syncope type  Comment: SBP's in the 110 range  Plan:   -daily vitals   -maintain hydration  -follow up orthostatics this week      Electronically signed by:  FLORENTINO Richmond CNP

## 2025-05-11 ENCOUNTER — MEDICAL CORRESPONDENCE (OUTPATIENT)
Dept: HEALTH INFORMATION MANAGEMENT | Facility: CLINIC | Age: OVER 89
End: 2025-05-11

## 2025-05-12 ENCOUNTER — TELEPHONE (OUTPATIENT)
Dept: FAMILY MEDICINE | Facility: CLINIC | Age: OVER 89
End: 2025-05-12
Payer: MEDICARE

## 2025-05-12 NOTE — TELEPHONE ENCOUNTER
FYI - Status Update    Who is Calling: family member, Daughter in law (javi)    Update: Wanting to do the hospital F/U and Pre op appt together. Did inform that they will be billing separately.   No call back if its okay, if not they would like a call.     Does caller want a call/response back: Yes     Okay to leave a detailed message?: Yes at Other phone number:  [phone]

## 2025-05-12 NOTE — TELEPHONE ENCOUNTER
Home Care is calling regarding an established patient with [facility].  Requesting orders from: Antoinette Bagley  RN APPROVED: RN able to provide verbal orders.  Home Care will send orders for signature.  RN will close encounter.  Is this a request for a temporary pause in the home care episode?  No    Orders Requested    Physical Therapy  Request for initial evaluation and treatment (one time)   RN gave verbal order: Yes      Occupational Therapy  Request for initial evaluation and treatment (one time)   RN gave verbal order: Yes      HHA (Home Health Aide)  Request for initial certification (first set of orders)   Frequency: 1 visit for 6 weeks  RN gave verbal order: Yes      Skilled Nursing  Request for initial certification (first set of orders)   Frequency: 1 visit for 3 weeks and 2 prn  RN gave verbal order: Yes      Contacts       Contact Date/Time Type Contact Phone/Fax    05/12/2025 09:30 AM CDT Phone (Incoming) Estefania [phone]     [facility]          Camelia Ibarra, RN

## 2025-05-13 ENCOUNTER — TELEPHONE (OUTPATIENT)
Dept: FAMILY MEDICINE | Facility: CLINIC | Age: OVER 89
End: 2025-05-13
Payer: MEDICARE

## 2025-05-13 NOTE — TELEPHONE ENCOUNTER
MTM referral from: Transitions of Care (recent hospital discharge, TCU discharge, or ED visit)    MTM referral outreach attempt #2 on May 13, 2025 at 11:56 AM      Outcome: Patient not reachable after several attempts, routed to Pharmacist Team/Provider as an FYI    Use vbc for the carrier/Plan on the flowsheet      MT Practitioner please send patient letter    GLENN Smith   [phone]

## 2025-05-15 DIAGNOSIS — Z53.9 DIAGNOSIS NOT YET DEFINED: Primary | ICD-10-CM

## 2025-05-15 PROCEDURE — G0180 MD CERTIFICATION HHA PATIENT: HCPCS | Performed by: FAMILY MEDICINE

## 2025-05-16 ENCOUNTER — TRANSFERRED RECORDS (OUTPATIENT)
Dept: HEALTH INFORMATION MANAGEMENT | Facility: CLINIC | Age: OVER 89
End: 2025-05-16
Payer: MEDICARE

## 2025-05-22 ENCOUNTER — OFFICE VISIT (OUTPATIENT)
Dept: FAMILY MEDICINE | Facility: CLINIC | Age: OVER 89
End: 2025-05-22
Payer: MEDICARE

## 2025-05-22 ENCOUNTER — RESULTS FOLLOW-UP (OUTPATIENT)
Dept: FAMILY MEDICINE | Facility: CLINIC | Age: OVER 89
End: 2025-05-22

## 2025-05-22 VITALS
HEIGHT: 69 IN | HEART RATE: 90 BPM | BODY MASS INDEX: 20.44 KG/M2 | TEMPERATURE: 98 F | DIASTOLIC BLOOD PRESSURE: 70 MMHG | WEIGHT: 138 LBS | OXYGEN SATURATION: 99 % | SYSTOLIC BLOOD PRESSURE: 128 MMHG | RESPIRATION RATE: 16 BRPM

## 2025-05-22 DIAGNOSIS — S72.001A HIP FRACTURE, RIGHT, CLOSED, INITIAL ENCOUNTER (H): ICD-10-CM

## 2025-05-22 DIAGNOSIS — A08.11 GASTROENTERITIS DUE TO NOROVIRUS: ICD-10-CM

## 2025-05-22 DIAGNOSIS — Z23 NEED FOR VACCINATION: ICD-10-CM

## 2025-05-22 DIAGNOSIS — Z09 HOSPITAL DISCHARGE FOLLOW-UP: Primary | ICD-10-CM

## 2025-05-22 DIAGNOSIS — Z87.81 S/P ORIF (OPEN REDUCTION INTERNAL FIXATION) FRACTURE: ICD-10-CM

## 2025-05-22 DIAGNOSIS — Z98.890 S/P ORIF (OPEN REDUCTION INTERNAL FIXATION) FRACTURE: ICD-10-CM

## 2025-05-22 DIAGNOSIS — R79.89 ELEVATED TROPONIN I LEVEL: ICD-10-CM

## 2025-05-22 DIAGNOSIS — N18.31 STAGE 3A CHRONIC KIDNEY DISEASE (H): ICD-10-CM

## 2025-05-22 LAB
BASOPHILS # BLD AUTO: 0 10E3/UL (ref 0–0.2)
BASOPHILS NFR BLD AUTO: 0 %
EOSINOPHIL # BLD AUTO: 0.1 10E3/UL (ref 0–0.7)
EOSINOPHIL NFR BLD AUTO: 1 %
ERYTHROCYTE [DISTWIDTH] IN BLOOD BY AUTOMATED COUNT: 16 % (ref 10–15)
HCT VFR BLD AUTO: 34.7 % (ref 40–53)
HGB BLD-MCNC: 11.3 G/DL (ref 13.3–17.7)
IMM GRANULOCYTES # BLD: 0 10E3/UL
IMM GRANULOCYTES NFR BLD: 0 %
LYMPHOCYTES # BLD AUTO: 0.6 10E3/UL (ref 0.8–5.3)
LYMPHOCYTES NFR BLD AUTO: 8 %
MCH RBC QN AUTO: 32.2 PG (ref 26.5–33)
MCHC RBC AUTO-ENTMCNC: 32.6 G/DL (ref 31.5–36.5)
MCV RBC AUTO: 99 FL (ref 78–100)
MONOCYTES # BLD AUTO: 0.8 10E3/UL (ref 0–1.3)
MONOCYTES NFR BLD AUTO: 12 %
NEUTROPHILS # BLD AUTO: 5.7 10E3/UL (ref 1.6–8.3)
NEUTROPHILS NFR BLD AUTO: 79 %
PLATELET # BLD AUTO: 323 10E3/UL (ref 150–450)
RBC # BLD AUTO: 3.51 10E6/UL (ref 4.4–5.9)
WBC # BLD AUTO: 7.2 10E3/UL (ref 4–11)

## 2025-05-22 RX ORDER — METOPROLOL SUCCINATE 25 MG/1
12.5 TABLET, EXTENDED RELEASE ORAL
COMMUNITY
Start: 2025-05-10

## 2025-05-22 RX ORDER — ATORVASTATIN CALCIUM 40 MG/1
40 TABLET, FILM COATED ORAL DAILY
COMMUNITY
Start: 2025-05-09

## 2025-05-22 NOTE — PROGRESS NOTES
Assessment & Plan     Hospital discharge follow-up  Improved, at home now    Gastroenteritis due to norovirus  Resolved, check labs , advised increased food, he skips lunch and he has lost weight recently, add shake in mid day if not eating, follow up in 8 weeks to recheck weight. And anemia    S/P ORIF (open reduction internal fixation) fracture  Anemia resolving, recheck in few weeks, stable, cont rehab with physical therapy exercises  - RSV vaccine, bivalent, ABRYSVO, injection; Inject 0.5 mLs into the muscle once for 1 dose. Pharmacist administered  - CBC with platelets and differential; Future  - Comprehensive metabolic panel (BMP + Alb, Alk Phos, ALT, AST, Total. Bili, TP); Future  - CBC with platelets and differential  - Comprehensive metabolic panel (BMP + Alb, Alk Phos, ALT, AST, Total. Bili, TP)    Hip fracture, right, closed, initial encounter (H)  As above  - RSV vaccine, bivalent, ABRYSVO, injection; Inject 0.5 mLs into the muscle once for 1 dose. Pharmacist administered  - CBC with platelets and differential; Future  - Comprehensive metabolic panel (BMP + Alb, Alk Phos, ALT, AST, Total. Bili, TP); Future  - CBC with platelets and differential  - Comprehensive metabolic panel (BMP + Alb, Alk Phos, ALT, AST, Total. Bili, TP)    Stage 3a chronic kidney disease (H)  Stable, avoid nephrotoxic drugs  - Albumin Random Urine Quantitative with Creat Ratio; Future  - Albumin Random Urine Quantitative with Creat Ratio      Elevated trop-no active chest pain, no dizziness, no palpitations. Seeing cards for angiogram, on statin and BB now, doing well on it     Need for vaccination    - Tdap, tetanus-diptheria-acell pertussis, (BOOSTRIX) 5-2.5-18.5 LF-MCG/0.5 ISAURA injection; Inject 0.5 mLs into the muscle once for 1 dose.  - RSV vaccine, bivalent, ABRYSVO, injection; Inject 0.5 mLs into the muscle once for 1 dose. Pharmacist administered        MED REC REQUIREDFollow-up       Anthony Gonzalez is a 89 year old,  presenting for the following health issues:  Hospital F/U        5/22/2025     8:34 AM   Additional Questions   Roomed by jason     [facility]        Hospital Follow-up Visit:    Hospital/Nursing Home/IP Rehab Facility: [facility]  Date of Admission: 5/7/25  Date of Discharge: 5/9/25  Reason(s) for Admission: vomiting  Was the patient in the ICU or did the patient experience delirium during hospitalization?  No  Do you have any other stressors you would like to discuss with your provider? No    Problems taking medications regularly:  None  Medication changes since discharge: None  Problems adhering to non-medication therapy:  None    Physical therapy for hip pain , s/p surgery and is doing well, he is activate, no falls rehab for few days , stayed to his son's    Vomiting and diarrhea, -norovirus, resolved  Other labs were negative  Resolved,  Does not eat lunch,   Trop elevated cardiology evaluated  Taking metropolol 12.5 mg daily     Summary of hospitalization:  CareEverywhere information obtained and reviewed  Diagnostic Tests/Treatments reviewed.  Follow up needed: none  Other Healthcare Providers Involved in Patient s Care:         None  Update since discharge: improved.         Plan of care communicated with patient             Hospital Follow-up Visit:    Hospital/Nursing Home/IP Rehab Facility: [facility]  Most Recent Admission Date: 4/29/2025   Most Recent Admission Diagnosis: Hip fracture, right, closed, initial encounter (H) - S72.001A     Most Recent Discharge Date: 5/3/2025   Most Recent Discharge Diagnosis: Hip fracture, right, closed, initial encounter (H) - S72.001A   Was the patient in the ICU or did the patient experience delirium during hospitalization?  No  Do you have any other stressors you would like to discuss with your provider? No    Problems taking medications regularly:  None  Medication changes since discharge: None  Problems adhering to non-medication therapy:   "None    Summary of hospitalization:  CareEverywhere information obtained and reviewed  Diagnostic Tests/Treatments reviewed.  Follow up needed: none  Other Healthcare Providers Involved in Patient s Care:         None  Update since discharge: improved.         Plan of care communicated with patient               Review of Systems  Constitutional, HEENT, cardiovascular, pulmonary, GI, , musculoskeletal, neuro, skin, endocrine and psych systems are negative, except as otherwise noted.      Objective    /70   Pulse 90   Temp 98  F (36.7  C) (Oral)   Resp 16   Ht 1.753 m (5' 9\")   Wt 62.6 kg (138 lb)   SpO2 99%   BMI 20.38 kg/m    Body mass index is 20.38 kg/m .  Physical Exam   GENERAL: alert and no distress  NECK: no adenopathy, no asymmetry, masses, or scars  RESP: lungs clear to auscultation - no rales, rhonchi or wheezes  CV: regular rate and rhythm, normal S1 S2, no S3 or S4, no murmur, click or rub, no peripheral edema  ABDOMEN: soft, nontender, no hepatosplenomegaly, no masses and bowel sounds normal  MS: no gross musculoskeletal defects noted, no edema    Results for orders placed or performed in visit on 05/22/25   CBC with platelets and differential     Status: Abnormal   Result Value Ref Range    WBC Count 7.2 4.0 - 11.0 10e3/uL    RBC Count 3.51 (L) 4.40 - 5.90 10e6/uL    Hemoglobin 11.3 (L) 13.3 - 17.7 g/dL    Hematocrit 34.7 (L) 40.0 - 53.0 %    MCV 99 78 - 100 fL    MCH 32.2 26.5 - 33.0 pg    MCHC 32.6 31.5 - 36.5 g/dL    RDW 16.0 (H) 10.0 - 15.0 %    Platelet Count 323 150 - 450 10e3/uL    % Neutrophils 79 %    % Lymphocytes 8 %    % Monocytes 12 %    % Eosinophils 1 %    % Basophils 0 %    % Immature Granulocytes 0 %    Absolute Neutrophils 5.7 1.6 - 8.3 10e3/uL    Absolute Lymphocytes 0.6 (L) 0.8 - 5.3 10e3/uL    Absolute Monocytes 0.8 0.0 - 1.3 10e3/uL    Absolute Eosinophils 0.1 0.0 - 0.7 10e3/uL    Absolute Basophils 0.0 0.0 - 0.2 10e3/uL    Absolute Immature Granulocytes 0.0 <=0.4 " 10e3/uL   CBC with platelets and differential     Status: Abnormal    Narrative    The following orders were created for panel order CBC with platelets and differential.  Procedure                               Abnormality         Status                     ---------                               -----------         ------                     CBC with platelets and ...[[id number]]  Abnormal            Final result                 Please view results for these tests on the individual orders.             Signed Electronically by: Antoinette Bagley DO

## 2025-05-22 NOTE — PATIENT INSTRUCTIONS
Anemia is better but not back to normal  Please eat more and may be add a shake in mid day  Please call when you get home to let us know your meds  Follow up on weight and anemia in 6-8 weeks  Antoinette Bagley D.O.            Patient Education

## 2025-05-29 ENCOUNTER — TELEPHONE (OUTPATIENT)
Dept: FAMILY MEDICINE | Facility: CLINIC | Age: OVER 89
End: 2025-05-29
Payer: MEDICARE

## 2025-05-29 NOTE — TELEPHONE ENCOUNTER
Forms received from [facility]/ Discipline Discharge - order #[id number] (order date(s): 5/28/25) for Dr. Bagley.  Forms placed in provider 'sign me' folder.  Please fax forms to [phone] after completion.    Van Nicholson RT (R) (ARRT)  Radiology Dept

## 2025-06-02 ENCOUNTER — TELEPHONE (OUTPATIENT)
Dept: FAMILY MEDICINE | Facility: CLINIC | Age: OVER 89
End: 2025-06-02
Payer: MEDICARE

## 2025-06-02 NOTE — TELEPHONE ENCOUNTER
Forms received from [facility]/ Discharge from agency - order #[id number] (order date(s): 5/31/25) for Dr. Bagley.  Forms placed in provider 'sign me' folder.  Please fax forms to [phone] after completion.    Van Nicholson RT (R) (ARRT)  Radiology Dept

## 2025-06-03 DIAGNOSIS — E78.2 MIXED HYPERLIPIDEMIA: Primary | ICD-10-CM

## 2025-06-03 DIAGNOSIS — I10 BENIGN ESSENTIAL HYPERTENSION: ICD-10-CM

## 2025-06-03 RX ORDER — METOPROLOL SUCCINATE 25 MG/1
12.5 TABLET, EXTENDED RELEASE ORAL
OUTPATIENT
Start: 2025-06-03

## 2025-06-03 RX ORDER — METOPROLOL SUCCINATE 25 MG/1
25 TABLET, EXTENDED RELEASE ORAL DAILY
Qty: 90 TABLET | Refills: 1 | Status: SHIPPED | OUTPATIENT
Start: 2025-06-03

## 2025-06-03 RX ORDER — ATORVASTATIN CALCIUM 40 MG/1
40 TABLET, FILM COATED ORAL DAILY
Qty: 90 TABLET | Refills: 0 | Status: SHIPPED | OUTPATIENT
Start: 2025-06-03

## 2025-06-03 NOTE — TELEPHONE ENCOUNTER
Medication Question or Refill        What medication are you calling about (include dose and sig)?: Refill on Metoprolol Succinate 25 mg 1/2 tab per day. 30 pills and Atorvastatin 40 mg 1 tab per day 30 pills    Preferred Pharmacy:   [id number] - [place], [place] - [address]  [address]  [place] [address]  Phone: [phone] Fax: [phone]      Controlled Substance Agreement on file:   CSA -- Patient Level:    CSA: None found at the patient level.       Who prescribed the medication?: [facility]    Do you need a refill? Yes    When did you use the medication last? This morning    Patient offered an appointment? Yes: last appt 5/22/25    Do you have any questions or concerns?  No      Okay to leave a detailed message?: Yes at Cell number on file:    Telephone Information:   Mobile [phone]

## 2025-06-03 NOTE — TELEPHONE ENCOUNTER
Routing message to provider.    Patient requesting refills of atorvastatin and Metoprolol prescribed at [facility]       Anemia is better but not back to normal  Please eat more and may be add a shake in mid day  Please call when you get home to let us know your meds  Follow up on weight and anemia in 6-8 weeks  Antoinette Bagley D.O.       Willing to refill medications?    Christine M Klisch, RN

## 2025-06-11 ENCOUNTER — TRANSFERRED RECORDS (OUTPATIENT)
Dept: HEALTH INFORMATION MANAGEMENT | Facility: CLINIC | Age: OVER 89
End: 2025-06-11
Payer: MEDICARE

## (undated) DEVICE — SOL NACL 0.9% IRRIG 1000ML BOTTLE 2F7124

## (undated) DEVICE — MAT FLOOR WATERPROOF BACKSHEET FMBP30

## (undated) DEVICE — SUCTION CANISTER MEDIVAC LINER 1500ML W/LID 65651-515

## (undated) DEVICE — SPECIMEN CONTAINER URINE 90ML STERILE 75.1435.002

## (undated) DEVICE — Device

## (undated) DEVICE — GLOVE PROTEXIS W/NEU-THERA 7.0  2D73TE70

## (undated) DEVICE — SUTURE VICRYL+ 2-0 CT-2 27" UND VCP269H

## (undated) DEVICE — DRSG TELFA 2X3"

## (undated) DEVICE — DRILL BIT QUICK COUPLING 3 FLUTE 4.2MMX330/100MM CALIBRATE

## (undated) DEVICE — DRSG MEPILEX BORDER ADHS 10CMX20CM STRL 496405

## (undated) DEVICE — WIRE GUIDE 3.2X400MM  357.399

## (undated) DEVICE — PADDING CAST 4IN WEBRIL STRL 2502

## (undated) DEVICE — SOL WATER IRRIG 1000ML BOTTLE 07139-09

## (undated) DEVICE — SOL WATER INJ 2000ML BAG 07118-07

## (undated) DEVICE — KIT PATIENT CARE HANA PROFX W/BOOT LINER SUPINE 6851

## (undated) DEVICE — GLOVE PROTEXIS BLUE W/NEU-THERA 7.5  2D73EB75

## (undated) DEVICE — SUTURE MONOCRYL 3-0 18 PS2 UND MCP497G

## (undated) DEVICE — DRILL BIT CANNULATED 10MM TAPERED

## (undated) DEVICE — SU VICRYL+ 0 27IN CT-1 UND VCP260H

## (undated) DEVICE — DRAPE C-ARM 60X42" 1013

## (undated) DEVICE — ESU GROUND PAD ADULT W/CORD E7507

## (undated) DEVICE — GOWN XLG DISP 9545

## (undated) DEVICE — CUSTOM PACK GEN MAJOR SBA5BGMHEA

## (undated) DEVICE — SPECIMEN CONTAINER 4OZ

## (undated) DEVICE — PREP CHLORAPREP 26ML TINTED HI-LITE ORANGE 930815

## (undated) DEVICE — GLOVE BIOGEL PI ULTRATOUCH G SZ 6.5 42165

## (undated) DEVICE — GLOVE BIOGEL PI ULTRATOUCH G SZ 7.5 42175

## (undated) DEVICE — DRAPE C-ARMOR 5 SIDED 5523

## (undated) DEVICE — GLOVE UNDER INDICATOR PI SZ 7.0 LF 41670

## (undated) DEVICE — DRILL BIT CANNULATED 16MM HOLLOW STER 03.037.004S

## (undated) DEVICE — SUCTION TIP FLEXI CLEAR TIP DISP K62

## (undated) DEVICE — SOL WATER IRRIG 1000ML BOTTLE 2F7114

## (undated) DEVICE — DRAPE STERI U 1015

## (undated) DEVICE — DRAPE IOBAN ISOLATION VERTICAL 320X21CM 6617

## (undated) DEVICE — SU DERMABOND ADVANCED .7ML DNX12

## (undated) DEVICE — GLOVE BIOGEL INDICATOR 7.5 LF 41675

## (undated) DEVICE — ESU GROUND PAD ADULT REM W/15' CORD E7507DB

## (undated) DEVICE — SYR 30ML LL W/O NDL

## (undated) DEVICE — EVACUATOR BLADDER UROVAC LATEX M0067301250

## (undated) DEVICE — PACK MINOR SINGLE BASIN SSK3001

## (undated) RX ORDER — CEFAZOLIN SODIUM 1 G/3ML
INJECTION, POWDER, FOR SOLUTION INTRAMUSCULAR; INTRAVENOUS
Status: DISPENSED
Start: 2025-04-29

## (undated) RX ORDER — BUPIVACAINE HYDROCHLORIDE AND EPINEPHRINE 2.5; 5 MG/ML; UG/ML
INJECTION, SOLUTION INFILTRATION; PERINEURAL
Status: DISPENSED
Start: 2025-04-29

## (undated) RX ORDER — EPHEDRINE SULFATE 50 MG/ML
INJECTION, SOLUTION INTRAMUSCULAR; INTRAVENOUS; SUBCUTANEOUS
Status: DISPENSED
Start: 2025-04-29

## (undated) RX ORDER — VANCOMYCIN HYDROCHLORIDE 1 G/20ML
INJECTION, POWDER, LYOPHILIZED, FOR SOLUTION INTRAVENOUS
Status: DISPENSED
Start: 2025-04-29

## (undated) RX ORDER — FENTANYL CITRATE 50 UG/ML
INJECTION, SOLUTION INTRAMUSCULAR; INTRAVENOUS
Status: DISPENSED
Start: 2025-04-29